# Patient Record
Sex: FEMALE | Race: WHITE | NOT HISPANIC OR LATINO | Employment: FULL TIME | URBAN - METROPOLITAN AREA
[De-identification: names, ages, dates, MRNs, and addresses within clinical notes are randomized per-mention and may not be internally consistent; named-entity substitution may affect disease eponyms.]

---

## 2017-03-07 RX ORDER — HYDROXYZINE HYDROCHLORIDE 10 MG/1
TABLET, FILM COATED ORAL
Qty: 90 TABLET | Refills: 1 | Status: SHIPPED | OUTPATIENT
Start: 2017-03-07 | End: 2018-05-11 | Stop reason: SDUPTHER

## 2017-03-24 RX ORDER — HYDROXYZINE HYDROCHLORIDE 10 MG/1
TABLET, FILM COATED ORAL
Qty: 90 TABLET | Refills: 0 | Status: SHIPPED | OUTPATIENT
Start: 2017-03-24 | End: 2017-08-16 | Stop reason: SDUPTHER

## 2017-07-28 DIAGNOSIS — Z12.11 COLON CANCER SCREENING: ICD-10-CM

## 2017-08-16 DIAGNOSIS — K21.9 GASTROESOPHAGEAL REFLUX DISEASE, ESOPHAGITIS PRESENCE NOT SPECIFIED: Primary | ICD-10-CM

## 2017-08-16 DIAGNOSIS — Z72.820 POOR SLEEP: ICD-10-CM

## 2017-08-16 RX ORDER — HYDROXYZINE HYDROCHLORIDE 10 MG/1
10 TABLET, FILM COATED ORAL NIGHTLY PRN
Qty: 30 TABLET | Refills: 0 | Status: SHIPPED | OUTPATIENT
Start: 2017-08-16 | End: 2017-11-15 | Stop reason: SDUPTHER

## 2017-08-16 RX ORDER — ESOMEPRAZOLE MAGNESIUM 40 MG/1
40 CAPSULE, DELAYED RELEASE ORAL
Qty: 30 CAPSULE | Refills: 0 | Status: SHIPPED | OUTPATIENT
Start: 2017-08-16 | End: 2017-11-15 | Stop reason: SDUPTHER

## 2017-08-25 DIAGNOSIS — Z12.31 OTHER SCREENING MAMMOGRAM: ICD-10-CM

## 2017-09-28 RX ORDER — ESOMEPRAZOLE MAGNESIUM 40 MG/1
CAPSULE, DELAYED RELEASE ORAL
Qty: 30 CAPSULE | Refills: 0 | Status: SHIPPED | OUTPATIENT
Start: 2017-09-28 | End: 2017-11-15 | Stop reason: SDUPTHER

## 2017-11-01 ENCOUNTER — PATIENT OUTREACH (OUTPATIENT)
Dept: INTERNAL MEDICINE | Facility: CLINIC | Age: 57
End: 2017-11-01

## 2017-11-01 NOTE — PROGRESS NOTES
[facility] is committed to your overall health.  To help you get the most out of each of your visits, we will review your information to make sure you are up to date on all of your recommended tests and/or procedures.       Your PCP  Tere Brown MD   found that you may be due for:       Health Maintenance Due   Topic Date Due    Mammogram  01/10/2002    Colonoscopy  01/10/2012             If you have had any of the above done at another facility, please bring the records or information with you so that your record at [facility] will be complete.  If you would like to schedule any of these, please contact me.     If you are currently taking medication, please bring it with you to your appointment for review.     Also, if you have any type of Advanced Directives, please bring them with you to your office visit so we may scan them into your chart.     Thank you for Choosing [facility] for your healthcare needs.      Additional Information  If you have questions, you can email [email or web address] or call [phone]  to talk to our [facility] staff. Remember, [facility] is NOT to be used for urgent needs. For medical emergencies, dial 911.

## 2017-11-15 ENCOUNTER — TELEPHONE (OUTPATIENT)
Dept: INTERNAL MEDICINE | Facility: CLINIC | Age: 57
End: 2017-11-15

## 2017-11-15 ENCOUNTER — LAB VISIT (OUTPATIENT)
Dept: LAB | Facility: HOSPITAL | Age: 57
End: 2017-11-15
Attending: FAMILY MEDICINE
Payer: COMMERCIAL

## 2017-11-15 ENCOUNTER — PATIENT MESSAGE (OUTPATIENT)
Dept: INTERNAL MEDICINE | Facility: CLINIC | Age: 57
End: 2017-11-15

## 2017-11-15 ENCOUNTER — OFFICE VISIT (OUTPATIENT)
Dept: INTERNAL MEDICINE | Facility: CLINIC | Age: 57
End: 2017-11-15
Payer: COMMERCIAL

## 2017-11-15 VITALS
HEART RATE: 88 BPM | SYSTOLIC BLOOD PRESSURE: 126 MMHG | DIASTOLIC BLOOD PRESSURE: 78 MMHG | BODY MASS INDEX: 32.87 KG/M2 | OXYGEN SATURATION: 97 % | HEIGHT: 65 IN | WEIGHT: 197.31 LBS

## 2017-11-15 DIAGNOSIS — K21.9 GASTROESOPHAGEAL REFLUX DISEASE, ESOPHAGITIS PRESENCE NOT SPECIFIED: ICD-10-CM

## 2017-11-15 DIAGNOSIS — Z00.00 ANNUAL PHYSICAL EXAM: ICD-10-CM

## 2017-11-15 DIAGNOSIS — Z80.41 FAMILY HISTORY OF OVARIAN CANCER: ICD-10-CM

## 2017-11-15 DIAGNOSIS — Z12.39 BREAST CANCER SCREENING: ICD-10-CM

## 2017-11-15 DIAGNOSIS — R73.03 PREDIABETES: ICD-10-CM

## 2017-11-15 DIAGNOSIS — Z12.11 COLON CANCER SCREENING: ICD-10-CM

## 2017-11-15 DIAGNOSIS — E78.5 HYPERLIPIDEMIA, UNSPECIFIED HYPERLIPIDEMIA TYPE: ICD-10-CM

## 2017-11-15 DIAGNOSIS — E55.9 VITAMIN D DEFICIENCY: ICD-10-CM

## 2017-11-15 DIAGNOSIS — E78.5 HYPERLIPIDEMIA, UNSPECIFIED HYPERLIPIDEMIA TYPE: Primary | ICD-10-CM

## 2017-11-15 DIAGNOSIS — Z51.81 ENCOUNTER FOR MONITORING LONG-TERM PROTON PUMP INHIBITOR THERAPY: ICD-10-CM

## 2017-11-15 DIAGNOSIS — G56.02 CARPAL TUNNEL SYNDROME OF LEFT WRIST: ICD-10-CM

## 2017-11-15 DIAGNOSIS — Z79.899 ENCOUNTER FOR MONITORING LONG-TERM PROTON PUMP INHIBITOR THERAPY: ICD-10-CM

## 2017-11-15 DIAGNOSIS — D35.01 ADENOMA OF RIGHT ADRENAL GLAND: ICD-10-CM

## 2017-11-15 DIAGNOSIS — Z00.00 ANNUAL PHYSICAL EXAM: Primary | ICD-10-CM

## 2017-11-15 LAB
25(OH)D3+25(OH)D2 SERPL-MCNC: 15 NG/ML
ALBUMIN SERPL BCP-MCNC: 3.4 G/DL
ALP SERPL-CCNC: 100 U/L
ALT SERPL W/O P-5'-P-CCNC: 15 U/L
ANION GAP SERPL CALC-SCNC: 8 MMOL/L
AST SERPL-CCNC: 15 U/L
BASOPHILS # BLD AUTO: 0.05 K/UL
BASOPHILS NFR BLD: 0.7 %
BILIRUB SERPL-MCNC: 0.4 MG/DL
BUN SERPL-MCNC: 13 MG/DL
CALCIUM SERPL-MCNC: 9.1 MG/DL
CHLORIDE SERPL-SCNC: 106 MMOL/L
CHOLEST SERPL-MCNC: 253 MG/DL
CHOLEST/HDLC SERPL: 5.1 {RATIO}
CO2 SERPL-SCNC: 27 MMOL/L
CREAT SERPL-MCNC: 0.8 MG/DL
DIFFERENTIAL METHOD: ABNORMAL
EOSINOPHIL # BLD AUTO: 0.1 K/UL
EOSINOPHIL NFR BLD: 1.7 %
ERYTHROCYTE [DISTWIDTH] IN BLOOD BY AUTOMATED COUNT: 13.4 %
EST. GFR  (AFRICAN AMERICAN): >60 ML/MIN/1.73 M^2
EST. GFR  (NON AFRICAN AMERICAN): >60 ML/MIN/1.73 M^2
ESTIMATED AVG GLUCOSE: 123 MG/DL
GLUCOSE SERPL-MCNC: 100 MG/DL
HBA1C MFR BLD HPLC: 5.9 %
HCT VFR BLD AUTO: 35.5 %
HDLC SERPL-MCNC: 50 MG/DL
HDLC SERPL: 19.8 %
HGB BLD-MCNC: 11.3 G/DL
IMM GRANULOCYTES # BLD AUTO: 0.05 K/UL
IMM GRANULOCYTES NFR BLD AUTO: 0.7 %
LDLC SERPL CALC-MCNC: 154.8 MG/DL
LYMPHOCYTES # BLD AUTO: 2.6 K/UL
LYMPHOCYTES NFR BLD: 34.6 %
MAGNESIUM SERPL-MCNC: 2 MG/DL
MCH RBC QN AUTO: 26.3 PG
MCHC RBC AUTO-ENTMCNC: 31.8 G/DL
MCV RBC AUTO: 83 FL
MONOCYTES # BLD AUTO: 0.5 K/UL
MONOCYTES NFR BLD: 6 %
NEUTROPHILS # BLD AUTO: 4.3 K/UL
NEUTROPHILS NFR BLD: 56.3 %
NONHDLC SERPL-MCNC: 203 MG/DL
NRBC BLD-RTO: 0 /100 WBC
PLATELET # BLD AUTO: 325 K/UL
PMV BLD AUTO: 10.1 FL
POTASSIUM SERPL-SCNC: 4 MMOL/L
PROT SERPL-MCNC: 7.8 G/DL
RBC # BLD AUTO: 4.3 M/UL
SODIUM SERPL-SCNC: 141 MMOL/L
TRIGL SERPL-MCNC: 241 MG/DL
TSH SERPL DL<=0.005 MIU/L-ACNC: 1.02 UIU/ML
VIT B12 SERPL-MCNC: 404 PG/ML
WBC # BLD AUTO: 7.62 K/UL

## 2017-11-15 PROCEDURE — 84443 ASSAY THYROID STIM HORMONE: CPT

## 2017-11-15 PROCEDURE — 80053 COMPREHEN METABOLIC PANEL: CPT

## 2017-11-15 PROCEDURE — 83735 ASSAY OF MAGNESIUM: CPT

## 2017-11-15 PROCEDURE — 99999 PR PBB SHADOW E&M-EST. PATIENT-LVL III: CPT | Mod: PBBFAC,,, | Performed by: FAMILY MEDICINE

## 2017-11-15 PROCEDURE — 82306 VITAMIN D 25 HYDROXY: CPT

## 2017-11-15 PROCEDURE — 82607 VITAMIN B-12: CPT

## 2017-11-15 PROCEDURE — 80061 LIPID PANEL: CPT

## 2017-11-15 PROCEDURE — 36415 COLL VENOUS BLD VENIPUNCTURE: CPT

## 2017-11-15 PROCEDURE — 85025 COMPLETE CBC W/AUTO DIFF WBC: CPT

## 2017-11-15 PROCEDURE — 99396 PREV VISIT EST AGE 40-64: CPT | Mod: S$GLB,,, | Performed by: FAMILY MEDICINE

## 2017-11-15 PROCEDURE — 83036 HEMOGLOBIN GLYCOSYLATED A1C: CPT

## 2017-11-15 RX ORDER — ESOMEPRAZOLE MAGNESIUM 40 MG/1
40 CAPSULE, DELAYED RELEASE ORAL EVERY MORNING
Qty: 90 CAPSULE | Refills: 3 | Status: SHIPPED | OUTPATIENT
Start: 2017-11-15 | End: 2018-01-15

## 2017-11-15 RX ORDER — PRAVASTATIN SODIUM 20 MG/1
20 TABLET ORAL DAILY
Qty: 90 TABLET | Refills: 3 | Status: SHIPPED | OUTPATIENT
Start: 2017-11-15 | End: 2017-11-15 | Stop reason: SDUPTHER

## 2017-11-15 RX ORDER — PRAVASTATIN SODIUM 40 MG/1
40 TABLET ORAL DAILY
Qty: 90 TABLET | Refills: 1 | Status: SHIPPED | OUTPATIENT
Start: 2017-11-15 | End: 2018-05-07 | Stop reason: SDUPTHER

## 2017-11-15 NOTE — PROGRESS NOTES
Subjective:       Patient ID: Teodora Yuen is a 57 y.o. female.    Chief Complaint: Annual Exam    HPI   56 y/o female with Vit D def, hx of Osteopenia, family hx of ovarian ca is here for annual exam, she moved here from [place] last year  L hand pain over thenar eminence, she has noticed it more over the past 2 months, she is L handed and a , pain is throbbing, tender to the touch, she wakes up at night and has to shake it out, she denies L hand weakness, she has tried baby asa which did not help  Doing well, denies f, has been out of her nexium now she has some nausea and vomiting and heartburn, denies d/constipation/cp/palpitations/sob/urinary sx. She is active and doing 17,000 steps a day, doing elliptical 30 min a day.  Sleeping well with prn hydroxyzine. Eating healthy.  Colonoscopy done in 2015  GYN: mmg due, no recent pelvic but stacy with bso in 1999 secondary to family hx of ovarian cancer and endometriosis  Dexa: normal in 2014  Eye due  Dental due  Vaccine: utd  R sided adrenal mass: was seen by endo, has imaging done every 5 years, last was done in 11/2014, will try to retrieve again  Gerd: Dr. Hester did EGD 2 years, she has a hiatal hernia, has gastric ulcer in the past, her symptoms are fairly well controlled on nexium 40 mg  Osteopenia: was on Actonel for 6 months and repeat scan was normal per patient need to get report    Review of Systems   Constitutional: Negative for activity change, appetite change, fatigue and fever.   Respiratory: Negative for cough and shortness of breath.    Cardiovascular: Negative for chest pain, palpitations and leg swelling.   Gastrointestinal: Positive for nausea and vomiting. Negative for constipation and diarrhea.        Heartburn   Genitourinary: Negative for difficulty urinating and dysuria.   Musculoskeletal:        L thenar emininence pain   Skin: Negative for rash.   Neurological: Negative for dizziness and light-headedness.   Psychiatric/Behavioral:  Negative for sleep disturbance.       Objective:      Physical Exam   Constitutional: She appears well-developed and well-nourished.   HENT:   Head: Normocephalic and atraumatic.   Mouth/Throat: No oropharyngeal exudate.   Neck: Normal range of motion. Neck supple. No thyromegaly present.   Cardiovascular: Normal rate, regular rhythm and normal heart sounds.    Pulmonary/Chest: Effort normal and breath sounds normal. No respiratory distress.   Abdominal: Soft. Bowel sounds are normal. She exhibits no distension.   Musculoskeletal: Normal range of motion. She exhibits no edema.   Strength 5/5 bilateral upper and lower extremity     Lymphadenopathy:     She has no cervical adenopathy.   Neurological: She is alert.   Skin: Skin is warm and dry.   Psychiatric: She has a normal mood and affect.   Nursing note and vitals reviewed.      Assessment:       1. Annual physical exam    2. Breast cancer screening    3. Colon cancer screening    4. Prediabetes    5. Hyperlipidemia, unspecified hyperlipidemia type    6. Gastroesophageal reflux disease, esophagitis presence not specified    7. Adenoma of right adrenal gland    8. Carpal tunnel syndrome of left wrist    9. Family history of ovarian cancer    10. Vitamin D deficiency    11. Encounter for monitoring long-term proton pump inhibitor therapy        Plan:       Teodora was seen today for annual exam.    Diagnoses and all orders for this visit:    Annual physical exam  -     Comprehensive metabolic panel; Future  -     CBC auto differential; Future  -     TSH; Future    Breast cancer screening    Colon cancer screening    Prediabetes  -     Hemoglobin A1c; Future    Hyperlipidemia, unspecified hyperlipidemia type  -     pravastatin (PRAVACHOL) 20 MG tablet; Take 1 tablet (20 mg total) by mouth once daily.  -     Lipid panel; Future    Gastroesophageal reflux disease, esophagitis presence not specified  -     esomeprazole (NEXIUM) 40 MG capsule; Take 1 capsule (40 mg total)  by mouth every morning.  -     Vitamin B12; Future  -     Magnesium; Future    Adenoma of right adrenal gland    Carpal tunnel syndrome of left wrist    Family history of ovarian cancer    Vitamin D deficiency  -     Vitamin D; Future    Encounter for monitoring long-term proton pump inhibitor therapy  -     Vitamin B12; Future  -     Magnesium; Future    Other orders  -     Cancel: Mammo Digital Screening Bilat with Tomosynthesis CAD; Future  -     Cancel: Ambulatory consult to Gastroenterology        Discussed CTS and treatment options, splinting, injection and surgery, she will let me know if wants to pursue EMG or hand ortho visit

## 2017-11-22 ENCOUNTER — TELEPHONE (OUTPATIENT)
Dept: INTERNAL MEDICINE | Facility: CLINIC | Age: 57
End: 2017-11-22

## 2018-01-04 ENCOUNTER — TELEPHONE (OUTPATIENT)
Dept: INTERNAL MEDICINE | Facility: CLINIC | Age: 58
End: 2018-01-04

## 2018-01-04 NOTE — TELEPHONE ENCOUNTER
----- Message from Kristina Bro sent at 1/4/2018 10:32 AM CST -----  Contact: pt   x 1st Request  _ 2nd Request  _ 3rd Request    Who: pt     Why: Pt is calling with questions in regards to her, esomeprazole (NEXIUM) 40 MG capsule 90 capsule.     What Number to Call Back: [phone]     When to Expect a call back: (Before the end of the day)  -- if call after 3:00 call back will be tomorrow.

## 2018-01-04 NOTE — TELEPHONE ENCOUNTER
Pt states that nexium is going up to 45 dollars for either a 30 day supply or 90 day supply and she does not want to pay the higher price. Pt would like to know what other medication she could take instead of nexium. Pt is just looking fo advise now and does not want a new script yet. Please advise and authorize.

## 2018-01-05 NOTE — TELEPHONE ENCOUNTER
She can either by it OTC nexium and take 2 at a time to equal 40 mg or see if she does ok on 20 mg which is the OTC dose or we can try omeprazole or pantoprazole, she can her pharmacy to see which would be cheaper

## 2018-01-05 NOTE — TELEPHONE ENCOUNTER
Informed pt of Dr. Brown advise. Pt states that she has tried and failed pantoprazole in the past and that the medication does not work for her. Pt states that she will call her pharmacy to see how much the medication options would cost. Pt has no further questions or concerns.

## 2018-01-10 ENCOUNTER — PATIENT MESSAGE (OUTPATIENT)
Dept: INTERNAL MEDICINE | Facility: CLINIC | Age: 58
End: 2018-01-10

## 2018-01-15 RX ORDER — CALC/MAG/B COMPLEX/D3/HERB 61
15 TABLET ORAL DAILY
Qty: 90 CAPSULE | Refills: 1 | Status: SHIPPED | OUTPATIENT
Start: 2018-01-15 | End: 2018-05-11

## 2018-05-07 DIAGNOSIS — E78.5 HYPERLIPIDEMIA, UNSPECIFIED HYPERLIPIDEMIA TYPE: ICD-10-CM

## 2018-05-07 RX ORDER — PRAVASTATIN SODIUM 40 MG/1
40 TABLET ORAL DAILY
Qty: 90 TABLET | Refills: 1 | Status: SHIPPED | OUTPATIENT
Start: 2018-05-07 | End: 2018-12-27 | Stop reason: SDUPTHER

## 2018-05-11 ENCOUNTER — PATIENT MESSAGE (OUTPATIENT)
Dept: INTERNAL MEDICINE | Facility: CLINIC | Age: 58
End: 2018-05-11

## 2018-05-11 DIAGNOSIS — K21.9 GASTROESOPHAGEAL REFLUX DISEASE, ESOPHAGITIS PRESENCE NOT SPECIFIED: Primary | ICD-10-CM

## 2018-05-11 RX ORDER — ESOMEPRAZOLE MAGNESIUM 40 MG/1
40 CAPSULE, DELAYED RELEASE ORAL
Qty: 90 CAPSULE | Refills: 1 | Status: SHIPPED | OUTPATIENT
Start: 2018-05-11 | End: 2018-10-26 | Stop reason: SDUPTHER

## 2018-05-11 RX ORDER — ESOMEPRAZOLE MAGNESIUM 40 MG/1
CAPSULE, DELAYED RELEASE ORAL
COMMUNITY
Start: 2018-02-22 | End: 2018-05-11 | Stop reason: SDUPTHER

## 2018-05-11 RX ORDER — HYDROXYZINE HYDROCHLORIDE 10 MG/1
10 TABLET, FILM COATED ORAL NIGHTLY PRN
Qty: 90 TABLET | Refills: 1 | Status: SHIPPED | OUTPATIENT
Start: 2018-05-11 | End: 2021-11-03 | Stop reason: SDUPTHER

## 2018-08-10 DIAGNOSIS — Z12.11 COLON CANCER SCREENING: ICD-10-CM

## 2018-10-26 ENCOUNTER — PATIENT MESSAGE (OUTPATIENT)
Dept: INTERNAL MEDICINE | Facility: CLINIC | Age: 58
End: 2018-10-26

## 2018-10-26 DIAGNOSIS — K21.9 GASTROESOPHAGEAL REFLUX DISEASE, ESOPHAGITIS PRESENCE NOT SPECIFIED: ICD-10-CM

## 2018-10-26 DIAGNOSIS — Z79.899 ENCOUNTER FOR MONITORING LONG-TERM PROTON PUMP INHIBITOR THERAPY: ICD-10-CM

## 2018-10-26 DIAGNOSIS — Z00.00 ANNUAL PHYSICAL EXAM: ICD-10-CM

## 2018-10-26 DIAGNOSIS — Z51.81 ENCOUNTER FOR MONITORING LONG-TERM PROTON PUMP INHIBITOR THERAPY: ICD-10-CM

## 2018-10-26 DIAGNOSIS — E78.5 HYPERLIPIDEMIA, UNSPECIFIED HYPERLIPIDEMIA TYPE: ICD-10-CM

## 2018-10-26 DIAGNOSIS — R73.03 PREDIABETES: Primary | ICD-10-CM

## 2018-10-26 RX ORDER — ESOMEPRAZOLE MAGNESIUM 40 MG/1
40 CAPSULE, DELAYED RELEASE ORAL
Qty: 90 CAPSULE | Refills: 1 | Status: CANCELLED | OUTPATIENT
Start: 2018-10-26

## 2018-10-29 ENCOUNTER — PATIENT OUTREACH (OUTPATIENT)
Dept: ADMINISTRATIVE | Facility: HOSPITAL | Age: 58
End: 2018-10-29

## 2018-10-29 ENCOUNTER — PATIENT MESSAGE (OUTPATIENT)
Dept: ADMINISTRATIVE | Facility: HOSPITAL | Age: 58
End: 2018-10-29

## 2018-10-29 DIAGNOSIS — Z12.39 BREAST CANCER SCREENING: ICD-10-CM

## 2018-10-29 RX ORDER — ESOMEPRAZOLE MAGNESIUM 40 MG/1
40 CAPSULE, DELAYED RELEASE ORAL
Qty: 90 CAPSULE | Refills: 1 | Status: SHIPPED | OUTPATIENT
Start: 2018-10-29 | End: 2018-10-29 | Stop reason: SDUPTHER

## 2018-10-29 RX ORDER — ESOMEPRAZOLE MAGNESIUM 40 MG/1
40 CAPSULE, DELAYED RELEASE ORAL
Qty: 90 CAPSULE | Refills: 1 | Status: SHIPPED | OUTPATIENT
Start: 2018-10-29 | End: 2019-06-28 | Stop reason: SDUPTHER

## 2018-10-29 NOTE — TELEPHONE ENCOUNTER
Pt informed prescription was sent to [facility]. Scheduled pt for labs 12/17 at [facility].

## 2018-12-13 ENCOUNTER — PATIENT OUTREACH (OUTPATIENT)
Dept: ADMINISTRATIVE | Facility: HOSPITAL | Age: 58
End: 2018-12-13

## 2018-12-17 ENCOUNTER — LAB VISIT (OUTPATIENT)
Dept: LAB | Facility: HOSPITAL | Age: 58
End: 2018-12-17
Attending: FAMILY MEDICINE
Payer: COMMERCIAL

## 2018-12-17 DIAGNOSIS — E78.5 HYPERLIPIDEMIA, UNSPECIFIED HYPERLIPIDEMIA TYPE: ICD-10-CM

## 2018-12-17 DIAGNOSIS — Z51.81 ENCOUNTER FOR MONITORING LONG-TERM PROTON PUMP INHIBITOR THERAPY: ICD-10-CM

## 2018-12-17 DIAGNOSIS — R73.03 PREDIABETES: ICD-10-CM

## 2018-12-17 DIAGNOSIS — Z00.00 ANNUAL PHYSICAL EXAM: ICD-10-CM

## 2018-12-17 DIAGNOSIS — Z79.899 ENCOUNTER FOR MONITORING LONG-TERM PROTON PUMP INHIBITOR THERAPY: ICD-10-CM

## 2018-12-17 DIAGNOSIS — K21.9 GASTROESOPHAGEAL REFLUX DISEASE, ESOPHAGITIS PRESENCE NOT SPECIFIED: ICD-10-CM

## 2018-12-17 LAB
25(OH)D3+25(OH)D2 SERPL-MCNC: 27 NG/ML
ALBUMIN SERPL BCP-MCNC: 3.4 G/DL
ALP SERPL-CCNC: 104 U/L
ALT SERPL W/O P-5'-P-CCNC: 17 U/L
ANION GAP SERPL CALC-SCNC: 10 MMOL/L
AST SERPL-CCNC: 18 U/L
BASOPHILS # BLD AUTO: 0.07 K/UL
BASOPHILS NFR BLD: 0.9 %
BILIRUB SERPL-MCNC: 0.3 MG/DL
BUN SERPL-MCNC: 16 MG/DL
CALCIUM SERPL-MCNC: 9.1 MG/DL
CHLORIDE SERPL-SCNC: 106 MMOL/L
CHOLEST SERPL-MCNC: 201 MG/DL
CHOLEST/HDLC SERPL: 4.6 {RATIO}
CO2 SERPL-SCNC: 24 MMOL/L
CREAT SERPL-MCNC: 0.7 MG/DL
DIFFERENTIAL METHOD: ABNORMAL
EOSINOPHIL # BLD AUTO: 0.3 K/UL
EOSINOPHIL NFR BLD: 3.5 %
ERYTHROCYTE [DISTWIDTH] IN BLOOD BY AUTOMATED COUNT: 16.2 %
EST. GFR  (AFRICAN AMERICAN): >60 ML/MIN/1.73 M^2
EST. GFR  (NON AFRICAN AMERICAN): >60 ML/MIN/1.73 M^2
ESTIMATED AVG GLUCOSE: 131 MG/DL
GLUCOSE SERPL-MCNC: 103 MG/DL
HBA1C MFR BLD HPLC: 6.2 %
HCT VFR BLD AUTO: 37.5 %
HDLC SERPL-MCNC: 44 MG/DL
HDLC SERPL: 21.9 %
HGB BLD-MCNC: 11.1 G/DL
IMM GRANULOCYTES # BLD AUTO: 0.04 K/UL
IMM GRANULOCYTES NFR BLD AUTO: 0.5 %
LDLC SERPL CALC-MCNC: 107.4 MG/DL
LYMPHOCYTES # BLD AUTO: 2.5 K/UL
LYMPHOCYTES NFR BLD: 33.9 %
MAGNESIUM SERPL-MCNC: 1.9 MG/DL
MCH RBC QN AUTO: 22.9 PG
MCHC RBC AUTO-ENTMCNC: 29.6 G/DL
MCV RBC AUTO: 78 FL
MONOCYTES # BLD AUTO: 0.5 K/UL
MONOCYTES NFR BLD: 6.3 %
NEUTROPHILS # BLD AUTO: 4.1 K/UL
NEUTROPHILS NFR BLD: 54.9 %
NONHDLC SERPL-MCNC: 157 MG/DL
NRBC BLD-RTO: 0 /100 WBC
PLATELET # BLD AUTO: 382 K/UL
PMV BLD AUTO: 10.8 FL
POTASSIUM SERPL-SCNC: 3.9 MMOL/L
PROT SERPL-MCNC: 7.6 G/DL
RBC # BLD AUTO: 4.84 M/UL
SODIUM SERPL-SCNC: 140 MMOL/L
TRIGL SERPL-MCNC: 248 MG/DL
TSH SERPL DL<=0.005 MIU/L-ACNC: 0.8 UIU/ML
VIT B12 SERPL-MCNC: 547 PG/ML
WBC # BLD AUTO: 7.5 K/UL

## 2018-12-17 PROCEDURE — 80053 COMPREHEN METABOLIC PANEL: CPT

## 2018-12-17 PROCEDURE — 36415 COLL VENOUS BLD VENIPUNCTURE: CPT

## 2018-12-17 PROCEDURE — 82607 VITAMIN B-12: CPT

## 2018-12-17 PROCEDURE — 85025 COMPLETE CBC W/AUTO DIFF WBC: CPT

## 2018-12-17 PROCEDURE — 80061 LIPID PANEL: CPT

## 2018-12-17 PROCEDURE — 83735 ASSAY OF MAGNESIUM: CPT

## 2018-12-17 PROCEDURE — 83036 HEMOGLOBIN GLYCOSYLATED A1C: CPT

## 2018-12-17 PROCEDURE — 82306 VITAMIN D 25 HYDROXY: CPT

## 2018-12-17 PROCEDURE — 84443 ASSAY THYROID STIM HORMONE: CPT

## 2018-12-27 ENCOUNTER — OFFICE VISIT (OUTPATIENT)
Dept: INTERNAL MEDICINE | Facility: CLINIC | Age: 58
End: 2018-12-27
Payer: COMMERCIAL

## 2018-12-27 ENCOUNTER — CLINICAL SUPPORT (OUTPATIENT)
Dept: INTERNAL MEDICINE | Facility: CLINIC | Age: 58
End: 2018-12-27
Payer: COMMERCIAL

## 2018-12-27 ENCOUNTER — TELEPHONE (OUTPATIENT)
Dept: INTERNAL MEDICINE | Facility: CLINIC | Age: 58
End: 2018-12-27

## 2018-12-27 ENCOUNTER — PATIENT MESSAGE (OUTPATIENT)
Dept: ADMINISTRATIVE | Facility: HOSPITAL | Age: 58
End: 2018-12-27

## 2018-12-27 VITALS
OXYGEN SATURATION: 98 % | DIASTOLIC BLOOD PRESSURE: 80 MMHG | BODY MASS INDEX: 32.68 KG/M2 | HEIGHT: 65 IN | SYSTOLIC BLOOD PRESSURE: 112 MMHG | WEIGHT: 196.13 LBS | HEART RATE: 100 BPM

## 2018-12-27 DIAGNOSIS — Z80.41 FAMILY HISTORY OF OVARIAN CANCER: ICD-10-CM

## 2018-12-27 DIAGNOSIS — R03.0 ELEVATED BLOOD PRESSURE READING IN OFFICE WITHOUT DIAGNOSIS OF HYPERTENSION: ICD-10-CM

## 2018-12-27 DIAGNOSIS — K21.9 GASTROESOPHAGEAL REFLUX DISEASE, ESOPHAGITIS PRESENCE NOT SPECIFIED: ICD-10-CM

## 2018-12-27 DIAGNOSIS — E78.5 HYPERLIPIDEMIA, UNSPECIFIED HYPERLIPIDEMIA TYPE: ICD-10-CM

## 2018-12-27 DIAGNOSIS — Z90.710 POST-HYSTERECTOMY MENOPAUSE: ICD-10-CM

## 2018-12-27 DIAGNOSIS — E89.40 POST-HYSTERECTOMY MENOPAUSE: ICD-10-CM

## 2018-12-27 DIAGNOSIS — D50.9 IRON DEFICIENCY ANEMIA, UNSPECIFIED IRON DEFICIENCY ANEMIA TYPE: ICD-10-CM

## 2018-12-27 DIAGNOSIS — R73.03 PREDIABETES: ICD-10-CM

## 2018-12-27 DIAGNOSIS — D64.9 ANEMIA, UNSPECIFIED TYPE: ICD-10-CM

## 2018-12-27 DIAGNOSIS — M85.80 OSTEOPENIA, UNSPECIFIED LOCATION: ICD-10-CM

## 2018-12-27 DIAGNOSIS — R09.82 POST-NASAL DRIP: ICD-10-CM

## 2018-12-27 DIAGNOSIS — D35.01 ADENOMA OF RIGHT ADRENAL GLAND: ICD-10-CM

## 2018-12-27 DIAGNOSIS — Z00.00 ANNUAL PHYSICAL EXAM: Primary | ICD-10-CM

## 2018-12-27 LAB
FERRITIN SERPL-MCNC: 8 NG/ML
IRON SERPL-MCNC: 30 UG/DL
RETICS/RBC NFR AUTO: 1.6 %
SATURATED IRON: 6 %
TOTAL IRON BINDING CAPACITY: 506 UG/DL
TRANSFERRIN SERPL-MCNC: 342 MG/DL

## 2018-12-27 PROCEDURE — 83540 ASSAY OF IRON: CPT

## 2018-12-27 PROCEDURE — 99999 PR PBB SHADOW E&M-EST. PATIENT-LVL V: CPT | Mod: PBBFAC,,, | Performed by: FAMILY MEDICINE

## 2018-12-27 PROCEDURE — 99396 PREV VISIT EST AGE 40-64: CPT | Mod: S$GLB,,, | Performed by: FAMILY MEDICINE

## 2018-12-27 PROCEDURE — 82728 ASSAY OF FERRITIN: CPT

## 2018-12-27 PROCEDURE — 85045 AUTOMATED RETICULOCYTE COUNT: CPT

## 2018-12-27 PROCEDURE — 36415 COLL VENOUS BLD VENIPUNCTURE: CPT

## 2018-12-27 RX ORDER — AZELASTINE 1 MG/ML
1 SPRAY, METERED NASAL 2 TIMES DAILY
Qty: 30 ML | Refills: 3 | Status: SHIPPED | OUTPATIENT
Start: 2018-12-27 | End: 2020-02-03

## 2018-12-27 RX ORDER — METFORMIN HYDROCHLORIDE 500 MG/1
500 TABLET ORAL
Qty: 90 TABLET | Refills: 3 | Status: SHIPPED | OUTPATIENT
Start: 2018-12-27 | End: 2020-01-28

## 2018-12-27 RX ORDER — FLUTICASONE PROPIONATE 50 MCG
1 SPRAY, SUSPENSION (ML) NASAL DAILY
Qty: 16 G | Refills: 3 | Status: SHIPPED | OUTPATIENT
Start: 2018-12-27 | End: 2020-02-03

## 2018-12-27 RX ORDER — PRAVASTATIN SODIUM 40 MG/1
40 TABLET ORAL DAILY
Qty: 90 TABLET | Refills: 3 | Status: SHIPPED | OUTPATIENT
Start: 2018-12-27 | End: 2020-01-20 | Stop reason: SDUPTHER

## 2018-12-27 NOTE — PROGRESS NOTES
Subjective:       Patient ID: Teodora Yuen is a 58 y.o. female.    Chief Complaint: Annual Exam and Medication Refill    HPI  57 y/o female with Vit D def, hx of Osteopenia, family hx of ovarian ca is here for annual exam.  She is doing well, she taking the stairs at work, [place] not really eating healthy. Sleeping ok with hydroxyzine prn.  Doing well, denies f, heartburn controlled on nexium 40 mg daily, has been on it since 2012 she has not tried 20 mg, denies d/constipation/cp/palpitations/sob/urinary sx. She has had pnd, nasal congestion and dry cough on and off since thanksgiving, she is taking robitussin and mucinex, found out she had mold in one of her closets  Colonoscopy done in 2015 or 2016  GYN: mmg due, no recent pelvic but stacy with bso in 1999 secondary to family hx of ovarian cancer and endometriosis  Eye due  Dental due  Vaccine: utd  R sided adrenal mass: was seen by endo, has imaging done every 5 years, last was done in 11/2014  Gerd: Dr. Hester did EGD 2 years, she has a hiatal hernia, has gastric ulcer in the past, her symptoms are fairly well controlled on nexium 40 mg  Osteopenia: was on Actonel for 6 months in the past, repeat dexa due     Review of Systems   Constitutional: Negative for activity change, appetite change, fatigue and fever.   HENT: Positive for congestion and postnasal drip. Negative for sinus pressure and sore throat.    Respiratory: Positive for cough. Negative for shortness of breath.    Cardiovascular: Negative for chest pain, palpitations and leg swelling.   Gastrointestinal: Negative for constipation, diarrhea, nausea and vomiting.   Genitourinary: Negative for difficulty urinating and dysuria.   Skin: Negative for rash.   Neurological: Negative for dizziness and light-headedness.   Psychiatric/Behavioral: Negative for sleep disturbance.       Objective:      BP (!) 136/90 (BP Location: Left arm, Patient Position: Sitting, BP Method:  "Medium (Manual))   Pulse 100   Ht 5' 5" (1.651 m)   Wt 89 kg (196 lb 1.6 oz)   SpO2 98%   BMI 32.63 kg/m²   Physical Exam   Constitutional: She appears well-developed and well-nourished.   HENT:   Head: Normocephalic and atraumatic.   Mouth/Throat: No oropharyngeal exudate.   Neck: Normal range of motion. Neck supple. No thyromegaly present.   Cardiovascular: Normal rate, regular rhythm and normal heart sounds.   Pulmonary/Chest: Effort normal and breath sounds normal. No respiratory distress.   Abdominal: Soft. Bowel sounds are normal. She exhibits no distension. There is no tenderness.   Musculoskeletal: She exhibits no edema.   Lymphadenopathy:     She has no cervical adenopathy.   Neurological: She is alert.   Skin: Skin is warm and dry.   Psychiatric: She has a normal mood and affect.   Nursing note and vitals reviewed.      Assessment:       1. Annual physical exam    2. Adenoma of right adrenal gland    3. Family history of ovarian cancer    4. Hyperlipidemia, unspecified hyperlipidemia type    5. Prediabetes    6. Anemia, unspecified type    7. Gastroesophageal reflux disease, esophagitis presence not specified    8. Post-hysterectomy menopause    9. Osteopenia, unspecified location    10. Elevated blood pressure reading in office without diagnosis of hypertension    11. Post-nasal drip        Plan:   Teodora was seen today for annual exam and medication refill.    Diagnoses and all orders for this visit:    Annual physical exam  -     Ambulatory referral to Optometry    Adenoma of right adrenal gland  -     CT Abdomen Pelvis W Wo Contrast; Future    Family history of ovarian cancer  -     Ambulatory referral to Genetics    Hyperlipidemia, unspecified hyperlipidemia type  -     Ambulatory Referral to Bariatric Surgery  -     pravastatin (PRAVACHOL) 40 MG tablet; Take 1 tablet (40 mg total) by mouth once daily.    Prediabetes  -     Ambulatory Referral to Bariatric Surgery  -     metFORMIN (GLUCOPHAGE) 500 " MG tablet; Take 1 tablet (500 mg total) by mouth daily with breakfast.  -     Hemoglobin A1c; Future    Anemia, unspecified type  -     Ferritin; Future  -     Reticulocytes; Future  -     Cancel: Iron and TIBC; Future  -     Iron and TIBC; Future    Gastroesophageal reflux disease, esophagitis presence not specified    Post-hysterectomy menopause  -     DXA Bone Density Spine And Hip; Future    Osteopenia, unspecified location  -     DXA Bone Density Spine And Hip; Future    Elevated blood pressure reading in office without diagnosis of hypertension    Post-nasal drip  -     fluticasone (FLONASE) 50 mcg/actuation nasal spray; 1 spray (50 mcg total) by Each Nare route once daily.  -     azelastine (ASTELIN) 137 mcg (0.1 %) nasal spray; 1 spray (137 mcg total) by Nasal route 2 (two) times daily.    increasing vitamin D to 4,000 IU daily, trial of otc nexium 20 mg

## 2018-12-28 ENCOUNTER — PATIENT MESSAGE (OUTPATIENT)
Dept: INTERNAL MEDICINE | Facility: CLINIC | Age: 58
End: 2018-12-28

## 2018-12-28 PROBLEM — D50.9 IRON DEFICIENCY ANEMIA: Status: ACTIVE | Noted: 2018-12-28

## 2018-12-28 RX ORDER — FERROUS GLUCONATE 324(37.5)
1 TABLET ORAL
Qty: 90 TABLET | Refills: 6 | Status: SHIPPED | OUTPATIENT
Start: 2018-12-28 | End: 2019-10-21

## 2018-12-29 ENCOUNTER — HOSPITAL ENCOUNTER (OUTPATIENT)
Dept: RADIOLOGY | Facility: HOSPITAL | Age: 58
Discharge: HOME | End: 2018-12-29
Attending: FAMILY MEDICINE
Payer: COMMERCIAL

## 2018-12-29 DIAGNOSIS — D35.01 ADENOMA OF RIGHT ADRENAL GLAND: ICD-10-CM

## 2018-12-29 PROCEDURE — 74178 CT ABD&PLV WO CNTR FLWD CNTR: CPT | Mod: 26,,, | Performed by: RADIOLOGY

## 2018-12-29 PROCEDURE — 25500020 PHARM REV CODE 255: Performed by: FAMILY MEDICINE

## 2018-12-29 PROCEDURE — 74178 CT ABD&PLV WO CNTR FLWD CNTR: CPT | Mod: TC

## 2018-12-29 RX ADMIN — IOHEXOL 100 ML: 350 INJECTION, SOLUTION INTRAVENOUS at 11:12

## 2018-12-31 ENCOUNTER — PATIENT MESSAGE (OUTPATIENT)
Dept: ADMINISTRATIVE | Facility: HOSPITAL | Age: 58
End: 2018-12-31

## 2019-01-02 ENCOUNTER — TELEPHONE (OUTPATIENT)
Dept: INTERNAL MEDICINE | Facility: CLINIC | Age: 59
End: 2019-01-02

## 2019-01-02 DIAGNOSIS — D35.01 ADENOMA OF RIGHT ADRENAL GLAND: Primary | ICD-10-CM

## 2019-01-02 NOTE — TELEPHONE ENCOUNTER
----- Message from Tere Brown MD sent at 12/27/2018  8:43 AM CST -----  Please help with healthy eating goals

## 2019-01-02 NOTE — TELEPHONE ENCOUNTER
Patient referred by Dr. Brown for Health coaching (healthy eating goals, lifestyle changes).  Called patient and gave an explanation about Health Coaching program and invited participation.   Patient states she would like to participate.  States she is going to work on setting up appt and will call back. Gave direct contact number to call if she has any questions [phone].   Soniya Webb RN  Health

## 2019-01-02 NOTE — TELEPHONE ENCOUNTER
"Informed pt "Please let the patient know that Dr. Brown will be back tomorrow to review the results of the CT scan.  For now, the CT does not show anything urgent."    Pt verbalized and understood.  "

## 2019-01-02 NOTE — TELEPHONE ENCOUNTER
Please let the patient know that Dr. Brown will be back tomorrow to review the results of the CT scan.  For now, the CT does not show anything urgent.

## 2019-01-03 ENCOUNTER — OFFICE VISIT (OUTPATIENT)
Dept: UROLOGY | Facility: CLINIC | Age: 59
End: 2019-01-03
Payer: COMMERCIAL

## 2019-01-03 ENCOUNTER — PATIENT MESSAGE (OUTPATIENT)
Dept: UROLOGY | Facility: CLINIC | Age: 59
End: 2019-01-03

## 2019-01-03 VITALS
HEIGHT: 65 IN | BODY MASS INDEX: 32.99 KG/M2 | WEIGHT: 198 LBS | HEART RATE: 93 BPM | DIASTOLIC BLOOD PRESSURE: 83 MMHG | SYSTOLIC BLOOD PRESSURE: 130 MMHG

## 2019-01-03 DIAGNOSIS — D35.01 ADENOMA OF RIGHT ADRENAL GLAND: Primary | ICD-10-CM

## 2019-01-03 PROCEDURE — 99999 PR PBB SHADOW E&M-EST. PATIENT-LVL III: ICD-10-PCS | Mod: PBBFAC,,, | Performed by: UROLOGY

## 2019-01-03 PROCEDURE — 99204 PR OFFICE/OUTPT VISIT, NEW, LEVL IV, 45-59 MIN: ICD-10-PCS | Mod: S$GLB,,, | Performed by: UROLOGY

## 2019-01-03 PROCEDURE — 99999 PR PBB SHADOW E&M-EST. PATIENT-LVL III: CPT | Mod: PBBFAC,,, | Performed by: UROLOGY

## 2019-01-03 PROCEDURE — 3008F PR BODY MASS INDEX (BMI) DOCUMENTED: ICD-10-PCS | Mod: CPTII,S$GLB,, | Performed by: UROLOGY

## 2019-01-03 PROCEDURE — 3008F BODY MASS INDEX DOCD: CPT | Mod: CPTII,S$GLB,, | Performed by: UROLOGY

## 2019-01-03 PROCEDURE — 99204 OFFICE O/P NEW MOD 45 MIN: CPT | Mod: S$GLB,,, | Performed by: UROLOGY

## 2019-01-03 NOTE — PROGRESS NOTES
Subjective:       Patient ID: Teodora Yuen is a 58 y.o. female.    Chief Complaint: Advice Only (had a nodule on adenoma , and patient state it have grown a little  and she was concern about the increase in sides. )    HPI    Teodora Yuen is a 58 y.o. female here for discussion and management of adrenal gland adenocarcinoma. CT Abdomen on 12/29/18 shows a 3.4 cm right adrenal adenoma. Notes this was first diagnosed 10 years ago and at that time it was 1.5 cm. She last saw an endocrinologist in 2015. Notes headaches, fatigue, and  increased blood pressure. Patient does not take any blood pressure medication. 24 hour urine and blood tests have all been negative by an outside endocrinologist in the past.    Past Medical History:   Diagnosis Date    Adrenal mass, right     GERD (gastroesophageal reflux disease)     Hyperlipidemia        Past Surgical History:   Procedure Laterality Date    BILE DUCT STENT PLACEMENT      CHOLECYSTECTOMY      HYSTERECTOMY  1999    KRISTIN with BSO secondary to family hx ovarian ca and endometriosis    R ankle surgery Right     toe fracture Left     4th metatarsal       Family History   Problem Relation Age of Onset    Cancer Mother         ovarian    Cancer Maternal Grandmother         ovarian       Social History     Socioeconomic History    Marital status:      Spouse name: Not on file    Number of children: 3    Years of education: Not on file    Highest education level: Not on file   Social Needs    Financial resource strain: Not on file    Food insecurity - worry: Not on file    Food insecurity - inability: Not on file    Transportation needs - medical: Not on file    Transportation needs - non-medical: Not on file   Occupational History    Occupation:    Tobacco Use    Smoking status: Never Smoker   Substance and Sexual Activity    Alcohol use: Yes     Comment: social    Drug use: Not on file    Sexual activity: Not on file   Other Topics  Concern    Not on file   Social History Narrative    Not on file       Allergies:  Coconut    Medications:    Current Outpatient Medications:     azelastine (ASTELIN) 137 mcg (0.1 %) nasal spray, Use 1 spray (137 mcg total) in each nostril 2 (two) times daily., Disp: 30 mL, Rfl: 3    calcium carbonate-vitamin D3 (CALCIUM 500 WITH D) 500 mg(1,250mg) -400 unit Tab, , Disp: , Rfl:     esomeprazole (NEXIUM) 40 MG capsule, Take 1 capsule (40 mg total) by mouth before breakfast., Disp: 90 capsule, Rfl: 1    ferrous gluconate 324 mg (37.5 mg iron) Tab, Take 1 tablet (324 mg total) by mouth 3 (three) times daily with meals., Disp: 90 tablet, Rfl: 6    fluticasone (FLONASE) 50 mcg/actuation nasal spray, Use 1 spray (50 mcg total) in each nostril once daily., Disp: 16 g, Rfl: 3    metFORMIN (GLUCOPHAGE) 500 MG tablet, Take 1 tablet (500 mg total) by mouth daily with breakfast., Disp: 90 tablet, Rfl: 3    pravastatin (PRAVACHOL) 40 MG tablet, Take 1 tablet (40 mg total) by mouth once daily., Disp: 90 tablet, Rfl: 3    hydrOXYzine HCl (ATARAX) 10 MG Tab, Take 1 tablet (10 mg total) by mouth nightly as needed., Disp: 90 tablet, Rfl: 1    Review of Systems   Constitutional: Positive for fatigue. Negative for activity change, appetite change, chills, diaphoresis, fever and unexpected weight change.   HENT: Negative for congestion, dental problem, hearing loss, mouth sores, postnasal drip, rhinorrhea, sinus pressure and trouble swallowing.    Eyes: Negative for pain, discharge and itching.   Respiratory: Negative for apnea, cough, choking, chest tightness, shortness of breath and wheezing.    Cardiovascular: Negative for chest pain, palpitations and leg swelling.   Gastrointestinal: Negative for abdominal distention, abdominal pain, anal bleeding, blood in stool, constipation, diarrhea, nausea, rectal pain and vomiting.   Endocrine: Negative for polydipsia and polyuria.   Genitourinary: Negative for decreased urine  volume, difficulty urinating, dyspareunia, dysuria, enuresis, flank pain, frequency, genital sores, hematuria, menstrual problem, pelvic pain and urgency.   Musculoskeletal: Negative for arthralgias, back pain and myalgias.   Skin: Negative for color change, rash and wound.   Neurological: Positive for headaches. Negative for dizziness, syncope, speech difficulty and light-headedness.   Hematological: Negative for adenopathy. Does not bruise/bleed easily.   Psychiatric/Behavioral: Negative for behavioral problems, confusion and sleep disturbance.       Objective:      Physical Exam   Constitutional: She appears well-developed.   HENT:   Head: Normocephalic.   Neck: Neck supple.   Cardiovascular: Normal rate.    Pulmonary/Chest: Effort normal.   Abdominal: Soft.   Neurological: She is alert.   Skin: Skin is warm.     Psychiatric: She has a normal mood and affect.       Imaging:    CT Abdomen 12/29/18    3.4 cm right adrenal adenoma.    0.5 cm nonspecific solid pulmonary nodule at the left lower lobe (axial series 2, image 18).  For a solid nodule <6 mm, Fleischner Society 2017 guidelines recommend no routine follow up for a low risk patient, or follow-up with non-contrast chest CT at 12 months in a high risk patient. Several nonspecific subcentimeter hepatic hypodensities which are too small to characterize, most likely cysts.    Minimal central pneumobilia possibly due to recent biliary instrumentation. Correlate with surgical history.    Small hiatal hernia.    Diverticulosis coli.    Postsurgical changes of cholecystectomy and hysterectomy with oophorectomy.    Assessment:       1. Adenoma of right adrenal gland        Plan:       Teodora was seen today for advice only.    Diagnoses and all orders for this visit:    Adenoma of right adrenal gland  -     US Retroperitoneal Complete (Kidney and; Future  -     Cortisol, free, serum; Future          Reviewed abdominal CT with patient showing 3.4 cm benign  non-functioning adrenal adenoma that is growing.  Discussed surgical intervention however this isn't warranted at this time. Consider once it is 4-5 cm.  Recommended active surveillance.  All questions and concerns were addressed.  Serum cortisol level and will phone review results with patient.  Refer to Dr. Mal mike.  RTC 6 months with renal US. Consider MRI if US shows functioning gland.    I, Andrew Silverman, am acting as a scribe on this patient encounter in the presence and under the supervision of Dr. Cheng.    01/03/2019 7:05 AM    I, Dr. Cheng, personally performed the services described in this documentation.   All medical record entries made by the scribe were at my direction and in my presence.   I have reviewed the chart and agree that the record is accurate and complete.   Major Cheng MD.  7:28 AM 01/03/2019

## 2019-01-03 NOTE — LETTER
January 3, 2019      Tere Brown MD  [address] [place] [address]  [address]  [place] [address]           [facility]rology 4th Floor  [address]  [place] [address]  Phone: [phone]          Patient: Teodora Yuen   MR Number: [id number]   YOB: 1960   Date of Visit: 1/3/2019       Dear Dr. Tere Brown:    Thank you for referring Teodora Yuen to me for evaluation. Attached you will find relevant portions of my assessment and plan of care.    If you have questions, please do not hesitate to call me. I look forward to following Teodora Yuen along with you.    Sincerely,    Major Cheng Jr., MD    Enclosure  CC:  No Recipients    If you would like to receive this communication electronically, please contact [email or web address] or [phone] to request more information on [employer] Link access.    For providers and/or their staff who would like to refer a patient to [facility], please contact us through our one-stop-shop provider referral line, [facility], at [phone].    If you feel you have received this communication in error or would no longer like to receive these types of communications, please e-mail [email or web address]

## 2019-01-09 ENCOUNTER — PATIENT MESSAGE (OUTPATIENT)
Dept: UROLOGY | Facility: CLINIC | Age: 59
End: 2019-01-09

## 2019-03-10 ENCOUNTER — PATIENT MESSAGE (OUTPATIENT)
Dept: INTERNAL MEDICINE | Facility: CLINIC | Age: 59
End: 2019-03-10

## 2019-03-11 ENCOUNTER — TELEPHONE (OUTPATIENT)
Dept: INTERNAL MEDICINE | Facility: CLINIC | Age: 59
End: 2019-03-11

## 2019-03-11 NOTE — TELEPHONE ENCOUNTER
Please let her know that I do not prescribe Adipex.  Please offer her an appointment with 1 of our weight loss physicians for an appointment  at [facility] [place] or Dr. Campuzano at [facility].

## 2019-03-11 NOTE — TELEPHONE ENCOUNTER
"Informed pt Dr. Brown doesn't prescribe adipex. Offered pt an appt with , pt declined and stated,"she doesn't want to see another Dr.  "

## 2019-06-28 DIAGNOSIS — K21.9 GASTROESOPHAGEAL REFLUX DISEASE, ESOPHAGITIS PRESENCE NOT SPECIFIED: ICD-10-CM

## 2019-06-28 RX ORDER — ESOMEPRAZOLE MAGNESIUM 40 MG/1
40 CAPSULE, DELAYED RELEASE ORAL
Qty: 90 CAPSULE | Refills: 1 | Status: CANCELLED | OUTPATIENT
Start: 2019-06-28

## 2019-07-01 DIAGNOSIS — K21.9 GASTROESOPHAGEAL REFLUX DISEASE, ESOPHAGITIS PRESENCE NOT SPECIFIED: ICD-10-CM

## 2019-07-01 RX ORDER — ESOMEPRAZOLE MAGNESIUM 40 MG/1
40 CAPSULE, DELAYED RELEASE ORAL
Qty: 90 CAPSULE | Refills: 1 | Status: SHIPPED | OUTPATIENT
Start: 2019-07-01 | End: 2020-01-13 | Stop reason: SDUPTHER

## 2019-07-01 RX ORDER — ESOMEPRAZOLE MAGNESIUM 40 MG/1
40 CAPSULE, DELAYED RELEASE ORAL
Qty: 90 CAPSULE | Refills: 1 | Status: CANCELLED | OUTPATIENT
Start: 2019-06-28

## 2019-08-16 DIAGNOSIS — Z12.11 COLON CANCER SCREENING: ICD-10-CM

## 2020-01-10 DIAGNOSIS — Z12.39 BREAST CANCER SCREENING: ICD-10-CM

## 2020-01-13 DIAGNOSIS — E78.5 HYPERLIPIDEMIA, UNSPECIFIED HYPERLIPIDEMIA TYPE: ICD-10-CM

## 2020-01-13 DIAGNOSIS — D50.9 IRON DEFICIENCY ANEMIA, UNSPECIFIED IRON DEFICIENCY ANEMIA TYPE: ICD-10-CM

## 2020-01-13 DIAGNOSIS — R73.03 PREDIABETES: Primary | ICD-10-CM

## 2020-01-13 DIAGNOSIS — K21.9 GASTROESOPHAGEAL REFLUX DISEASE, ESOPHAGITIS PRESENCE NOT SPECIFIED: ICD-10-CM

## 2020-01-13 DIAGNOSIS — Z00.00 ANNUAL PHYSICAL EXAM: ICD-10-CM

## 2020-01-13 RX ORDER — ESOMEPRAZOLE MAGNESIUM 40 MG/1
40 CAPSULE, DELAYED RELEASE ORAL
Qty: 30 CAPSULE | Refills: 0 | Status: SHIPPED | OUTPATIENT
Start: 2020-01-13 | End: 2020-02-03 | Stop reason: SDUPTHER

## 2020-01-20 ENCOUNTER — PATIENT OUTREACH (OUTPATIENT)
Dept: ADMINISTRATIVE | Facility: HOSPITAL | Age: 60
End: 2020-01-20

## 2020-01-20 DIAGNOSIS — E78.5 HYPERLIPIDEMIA, UNSPECIFIED HYPERLIPIDEMIA TYPE: ICD-10-CM

## 2020-01-20 RX ORDER — PRAVASTATIN SODIUM 40 MG/1
40 TABLET ORAL DAILY
Qty: 90 TABLET | Refills: 0 | Status: SHIPPED | OUTPATIENT
Start: 2020-01-20 | End: 2020-02-03 | Stop reason: SDUPTHER

## 2020-01-20 RX ORDER — PRAVASTATIN SODIUM 40 MG/1
40 TABLET ORAL DAILY
Qty: 90 TABLET | Refills: 3 | Status: CANCELLED | OUTPATIENT
Start: 2020-01-20

## 2020-01-21 ENCOUNTER — LAB VISIT (OUTPATIENT)
Dept: LAB | Facility: HOSPITAL | Age: 60
End: 2020-01-21
Attending: FAMILY MEDICINE
Payer: COMMERCIAL

## 2020-01-21 DIAGNOSIS — E78.5 HYPERLIPIDEMIA, UNSPECIFIED HYPERLIPIDEMIA TYPE: ICD-10-CM

## 2020-01-21 DIAGNOSIS — R73.03 PREDIABETES: ICD-10-CM

## 2020-01-21 DIAGNOSIS — K21.9 GASTROESOPHAGEAL REFLUX DISEASE, ESOPHAGITIS PRESENCE NOT SPECIFIED: ICD-10-CM

## 2020-01-21 DIAGNOSIS — D50.9 IRON DEFICIENCY ANEMIA, UNSPECIFIED IRON DEFICIENCY ANEMIA TYPE: ICD-10-CM

## 2020-01-21 DIAGNOSIS — Z00.00 ANNUAL PHYSICAL EXAM: ICD-10-CM

## 2020-01-21 LAB
25(OH)D3+25(OH)D2 SERPL-MCNC: 15 NG/ML (ref 30–96)
ALBUMIN SERPL BCP-MCNC: 3.4 G/DL (ref 3.5–5.2)
ALP SERPL-CCNC: 96 U/L (ref 55–135)
ALT SERPL W/O P-5'-P-CCNC: 12 U/L (ref 10–44)
ANION GAP SERPL CALC-SCNC: 7 MMOL/L (ref 8–16)
AST SERPL-CCNC: 14 U/L (ref 10–40)
BASOPHILS # BLD AUTO: 0.06 K/UL (ref 0–0.2)
BASOPHILS NFR BLD: 0.8 % (ref 0–1.9)
BILIRUB SERPL-MCNC: 0.3 MG/DL (ref 0.1–1)
BUN SERPL-MCNC: 19 MG/DL (ref 6–20)
CALCIUM SERPL-MCNC: 8.8 MG/DL (ref 8.7–10.5)
CHLORIDE SERPL-SCNC: 108 MMOL/L (ref 95–110)
CHOLEST SERPL-MCNC: 178 MG/DL (ref 120–199)
CHOLEST/HDLC SERPL: 3.7 {RATIO} (ref 2–5)
CO2 SERPL-SCNC: 27 MMOL/L (ref 23–29)
CREAT SERPL-MCNC: 0.8 MG/DL (ref 0.5–1.4)
DIFFERENTIAL METHOD: ABNORMAL
EOSINOPHIL # BLD AUTO: 0.1 K/UL (ref 0–0.5)
EOSINOPHIL NFR BLD: 1.3 % (ref 0–8)
ERYTHROCYTE [DISTWIDTH] IN BLOOD BY AUTOMATED COUNT: 16 % (ref 11.5–14.5)
EST. GFR  (AFRICAN AMERICAN): >60 ML/MIN/1.73 M^2
EST. GFR  (NON AFRICAN AMERICAN): >60 ML/MIN/1.73 M^2
ESTIMATED AVG GLUCOSE: 134 MG/DL (ref 68–131)
FERRITIN SERPL-MCNC: 10 NG/ML (ref 20–300)
GLUCOSE SERPL-MCNC: 123 MG/DL (ref 70–110)
HBA1C MFR BLD HPLC: 6.3 % (ref 4–5.6)
HCT VFR BLD AUTO: 38.6 % (ref 37–48.5)
HDLC SERPL-MCNC: 48 MG/DL (ref 40–75)
HDLC SERPL: 27 % (ref 20–50)
HGB BLD-MCNC: 11.2 G/DL (ref 12–16)
IMM GRANULOCYTES # BLD AUTO: 0.04 K/UL (ref 0–0.04)
IMM GRANULOCYTES NFR BLD AUTO: 0.5 % (ref 0–0.5)
IRON SERPL-MCNC: 58 UG/DL (ref 30–160)
LDLC SERPL CALC-MCNC: 107.2 MG/DL (ref 63–159)
LYMPHOCYTES # BLD AUTO: 2.3 K/UL (ref 1–4.8)
LYMPHOCYTES NFR BLD: 29.7 % (ref 18–48)
MAGNESIUM SERPL-MCNC: 2 MG/DL (ref 1.6–2.6)
MCH RBC QN AUTO: 23.5 PG (ref 27–31)
MCHC RBC AUTO-ENTMCNC: 29 G/DL (ref 32–36)
MCV RBC AUTO: 81 FL (ref 82–98)
MONOCYTES # BLD AUTO: 0.5 K/UL (ref 0.3–1)
MONOCYTES NFR BLD: 6.9 % (ref 4–15)
NEUTROPHILS # BLD AUTO: 4.8 K/UL (ref 1.8–7.7)
NEUTROPHILS NFR BLD: 60.8 % (ref 38–73)
NONHDLC SERPL-MCNC: 130 MG/DL
NRBC BLD-RTO: 0 /100 WBC
PLATELET # BLD AUTO: 309 K/UL (ref 150–350)
PMV BLD AUTO: 10.5 FL (ref 9.2–12.9)
POTASSIUM SERPL-SCNC: 4.2 MMOL/L (ref 3.5–5.1)
PROT SERPL-MCNC: 7.3 G/DL (ref 6–8.4)
RBC # BLD AUTO: 4.77 M/UL (ref 4–5.4)
SATURATED IRON: 12 % (ref 20–50)
SODIUM SERPL-SCNC: 142 MMOL/L (ref 136–145)
TOTAL IRON BINDING CAPACITY: 477 UG/DL (ref 250–450)
TRANSFERRIN SERPL-MCNC: 322 MG/DL (ref 200–375)
TRIGL SERPL-MCNC: 114 MG/DL (ref 30–150)
TSH SERPL DL<=0.005 MIU/L-ACNC: 1.81 UIU/ML (ref 0.4–4)
VIT B12 SERPL-MCNC: 365 PG/ML (ref 210–950)
WBC # BLD AUTO: 7.82 K/UL (ref 3.9–12.7)

## 2020-01-21 PROCEDURE — 82728 ASSAY OF FERRITIN: CPT

## 2020-01-21 PROCEDURE — 83540 ASSAY OF IRON: CPT

## 2020-01-21 PROCEDURE — 83036 HEMOGLOBIN GLYCOSYLATED A1C: CPT

## 2020-01-21 PROCEDURE — 82306 VITAMIN D 25 HYDROXY: CPT

## 2020-01-21 PROCEDURE — 36415 COLL VENOUS BLD VENIPUNCTURE: CPT

## 2020-01-21 PROCEDURE — 82607 VITAMIN B-12: CPT

## 2020-01-21 PROCEDURE — 85025 COMPLETE CBC W/AUTO DIFF WBC: CPT

## 2020-01-21 PROCEDURE — 80061 LIPID PANEL: CPT

## 2020-01-21 PROCEDURE — 80053 COMPREHEN METABOLIC PANEL: CPT

## 2020-01-21 PROCEDURE — 83735 ASSAY OF MAGNESIUM: CPT

## 2020-01-21 PROCEDURE — 84443 ASSAY THYROID STIM HORMONE: CPT

## 2020-01-22 ENCOUNTER — LAB VISIT (OUTPATIENT)
Dept: LAB | Facility: HOSPITAL | Age: 60
End: 2020-01-22
Attending: FAMILY MEDICINE
Payer: COMMERCIAL

## 2020-01-22 DIAGNOSIS — Z12.11 COLON CANCER SCREENING: ICD-10-CM

## 2020-01-22 PROCEDURE — 82274 ASSAY TEST FOR BLOOD FECAL: CPT

## 2020-01-23 ENCOUNTER — PATIENT MESSAGE (OUTPATIENT)
Dept: INTERNAL MEDICINE | Facility: CLINIC | Age: 60
End: 2020-01-23

## 2020-01-23 ENCOUNTER — TELEPHONE (OUTPATIENT)
Dept: INTERNAL MEDICINE | Facility: CLINIC | Age: 60
End: 2020-01-23

## 2020-01-23 DIAGNOSIS — D35.01 ADENOMA OF RIGHT ADRENAL GLAND: Primary | ICD-10-CM

## 2020-01-30 LAB — HEMOCCULT STL QL IA: NEGATIVE

## 2020-02-03 ENCOUNTER — OFFICE VISIT (OUTPATIENT)
Dept: INTERNAL MEDICINE | Facility: CLINIC | Age: 60
End: 2020-02-03
Payer: COMMERCIAL

## 2020-02-03 VITALS
HEIGHT: 66 IN | SYSTOLIC BLOOD PRESSURE: 130 MMHG | DIASTOLIC BLOOD PRESSURE: 86 MMHG | TEMPERATURE: 98 F | WEIGHT: 194.69 LBS | OXYGEN SATURATION: 98 % | BODY MASS INDEX: 31.29 KG/M2 | HEART RATE: 82 BPM

## 2020-02-03 DIAGNOSIS — Z00.00 ANNUAL PHYSICAL EXAM: Primary | ICD-10-CM

## 2020-02-03 DIAGNOSIS — K21.9 GASTROESOPHAGEAL REFLUX DISEASE, ESOPHAGITIS PRESENCE NOT SPECIFIED: ICD-10-CM

## 2020-02-03 DIAGNOSIS — D35.01 ADENOMA OF RIGHT ADRENAL GLAND: ICD-10-CM

## 2020-02-03 DIAGNOSIS — M85.80 OSTEOPENIA, UNSPECIFIED LOCATION: ICD-10-CM

## 2020-02-03 DIAGNOSIS — Z80.41 FAMILY HISTORY OF OVARIAN CANCER: ICD-10-CM

## 2020-02-03 DIAGNOSIS — E55.9 VITAMIN D DEFICIENCY: ICD-10-CM

## 2020-02-03 DIAGNOSIS — Z78.0 POST-MENOPAUSAL: ICD-10-CM

## 2020-02-03 DIAGNOSIS — D50.9 IRON DEFICIENCY ANEMIA, UNSPECIFIED IRON DEFICIENCY ANEMIA TYPE: ICD-10-CM

## 2020-02-03 DIAGNOSIS — E78.5 HYPERLIPIDEMIA, UNSPECIFIED HYPERLIPIDEMIA TYPE: ICD-10-CM

## 2020-02-03 DIAGNOSIS — K59.00 CONSTIPATION, UNSPECIFIED CONSTIPATION TYPE: ICD-10-CM

## 2020-02-03 DIAGNOSIS — R73.03 PREDIABETES: ICD-10-CM

## 2020-02-03 PROCEDURE — 99396 PR PREVENTIVE VISIT,EST,40-64: ICD-10-PCS | Mod: S$GLB,,, | Performed by: FAMILY MEDICINE

## 2020-02-03 PROCEDURE — 99396 PREV VISIT EST AGE 40-64: CPT | Mod: S$GLB,,, | Performed by: FAMILY MEDICINE

## 2020-02-03 PROCEDURE — 99999 PR PBB SHADOW E&M-EST. PATIENT-LVL III: ICD-10-PCS | Mod: PBBFAC,,, | Performed by: FAMILY MEDICINE

## 2020-02-03 PROCEDURE — 99999 PR PBB SHADOW E&M-EST. PATIENT-LVL III: CPT | Mod: PBBFAC,,, | Performed by: FAMILY MEDICINE

## 2020-02-03 RX ORDER — ESOMEPRAZOLE MAGNESIUM 40 MG/1
40 CAPSULE, DELAYED RELEASE ORAL
Qty: 90 CAPSULE | Refills: 1 | Status: SHIPPED | OUTPATIENT
Start: 2020-02-03 | End: 2020-08-06 | Stop reason: SDUPTHER

## 2020-02-03 RX ORDER — POLYETHYLENE GLYCOL 3350 17 G/17G
17 POWDER, FOR SOLUTION ORAL DAILY
Qty: 850 G | Refills: 0 | Status: SHIPPED | OUTPATIENT
Start: 2020-02-03 | End: 2021-11-03

## 2020-02-03 RX ORDER — PRAVASTATIN SODIUM 40 MG/1
40 TABLET ORAL DAILY
Qty: 90 TABLET | Refills: 1 | Status: SHIPPED | OUTPATIENT
Start: 2020-02-03 | End: 2020-09-27 | Stop reason: SDUPTHER

## 2020-02-03 NOTE — PROGRESS NOTES
"Subjective:       Patient ID: Teodora Yuen is a 60 y.o. female.    Chief Complaint: Annual Exam    HPI  59 y/o female with Vit D def, hx of Osteopenia, family hx of ovarian ca is here for annual exam.      She is doing well, she is walking and doing elliptical 5 days a week, she is trying to eat healthy.  Sleeping ok using hydroxyzine prn last year used it about 7 times.  She denies f/n/v/d/constipation/cp/palpitations/sob/urinary sx.     Gerd: Dr. Zelaya did EGD 2 years, she has a hiatal hernia, has gastric ulcer in the past,trial of nexium 20 mg was done and she developed breakthrough, nexium 40 mg daily  GYN: no recent pelvic but stacy with bso in 1999 secondary to family hx of ovarian cancer and endometriosis, mmg due  Eye exam utd at [facility]  Dental utd  Vaccine: shingrix discussed  R sided adrenal mass: was seen by endo, has imaging done every 5 years, last was done in 11/2014  Osteopenia: was on Actonel for 6 months in the past, repeat dexa due  Colonoscopy done in 2015 or 2016 Dr. Zelaya    Review of Systems   Constitutional: Negative for activity change, appetite change, fatigue and fever.   Respiratory: Negative for cough and shortness of breath.    Cardiovascular: Negative for chest pain, palpitations and leg swelling.   Gastrointestinal: Negative for constipation, diarrhea, nausea and vomiting.   Genitourinary: Negative for difficulty urinating and dysuria.   Skin: Negative for rash.   Neurological: Negative for dizziness and light-headedness.   Psychiatric/Behavioral: Negative for sleep disturbance.       Objective:      /84 (BP Location: Left arm, Patient Position: Sitting, BP Method: Large (Manual))   Pulse 82   Temp 97.7 °F (36.5 °C)   Ht 5' 6" (1.676 m)   Wt 88.3 kg (194 lb 10.7 oz)   SpO2 98%   BMI 31.42 kg/m²   Physical Exam   Constitutional: She appears well-developed and well-nourished.   HENT:   Head: Normocephalic and atraumatic.   Mouth/Throat: No oropharyngeal exudate.   Neck: " Normal range of motion. Neck supple. No thyromegaly present.   Cardiovascular: Normal rate, regular rhythm and normal heart sounds.   Pulmonary/Chest: Effort normal and breath sounds normal. No respiratory distress.   Abdominal: Soft. Bowel sounds are normal. She exhibits no distension. There is no tenderness.   Musculoskeletal: She exhibits no edema.   Lymphadenopathy:     She has no cervical adenopathy.   Neurological: She is alert.   Skin: Skin is warm and dry.   Psychiatric: She has a normal mood and affect.   Nursing note and vitals reviewed.      Assessment:       1. Annual physical exam    2. Hyperlipidemia, unspecified hyperlipidemia type    3. Gastroesophageal reflux disease, esophagitis presence not specified    4. Prediabetes    5. Iron deficiency anemia, unspecified iron deficiency anemia type    6. Family history of ovarian cancer    7. Adenoma of right adrenal gland    8. Post-menopausal    9. Osteopenia, unspecified location    10. Constipation, unspecified constipation type    11. Vitamin D deficiency        Plan:   Teodora was seen today for annual exam.    Diagnoses and all orders for this visit:    Annual physical exam    Hyperlipidemia, unspecified hyperlipidemia type  -     pravastatin (PRAVACHOL) 40 MG tablet; Take 1 tablet (40 mg total) by mouth once daily.    Gastroesophageal reflux disease, esophagitis presence not specified  -     esomeprazole (NEXIUM) 40 MG capsule; Take 1 capsule (40 mg total) by mouth before breakfast.    Prediabetes  -     Hemoglobin A1c; Future    Iron deficiency anemia, unspecified iron deficiency anemia type  -     Iron and TIBC; Future  -     Ferritin; Future    Family history of ovarian cancer    Adenoma of right adrenal gland    Post-menopausal  -     DXA Bone Density Spine And Hip; Future    Osteopenia, unspecified location  -     DXA Bone Density Spine And Hip; Future    Constipation, unspecified constipation type  -     polyethylene glycol (GLYCOLAX) 17 gram/dose  powder; Take 17 g by mouth once daily.    Vitamin D deficiency  -     Vitamin D; Future

## 2020-02-03 NOTE — PATIENT INSTRUCTIONS
Magnesium Citrate Tablet 400 mg daily, I like [employer]net  Brand on amazon or whole food    Miralax

## 2020-03-05 ENCOUNTER — OFFICE VISIT (OUTPATIENT)
Dept: URGENT CARE | Facility: CLINIC | Age: 60
End: 2020-03-05
Payer: COMMERCIAL

## 2020-03-05 VITALS
HEART RATE: 88 BPM | OXYGEN SATURATION: 98 % | DIASTOLIC BLOOD PRESSURE: 75 MMHG | BODY MASS INDEX: 31.18 KG/M2 | SYSTOLIC BLOOD PRESSURE: 120 MMHG | TEMPERATURE: 98 F | HEIGHT: 66 IN | RESPIRATION RATE: 20 BRPM | WEIGHT: 194 LBS

## 2020-03-05 DIAGNOSIS — J06.9 URI WITH COUGH AND CONGESTION: Primary | ICD-10-CM

## 2020-03-05 DIAGNOSIS — R50.9 FEVER, UNSPECIFIED FEVER CAUSE: ICD-10-CM

## 2020-03-05 LAB
CTP QC/QA: YES
FLUAV AG NPH QL: NEGATIVE
FLUBV AG NPH QL: NEGATIVE

## 2020-03-05 PROCEDURE — 99214 OFFICE O/P EST MOD 30 MIN: CPT | Mod: 25,S$GLB,, | Performed by: NURSE PRACTITIONER

## 2020-03-05 PROCEDURE — 99214 PR OFFICE/OUTPT VISIT, EST, LEVL IV, 30-39 MIN: ICD-10-PCS | Mod: 25,S$GLB,, | Performed by: NURSE PRACTITIONER

## 2020-03-05 PROCEDURE — 87804 INFLUENZA ASSAY W/OPTIC: CPT | Mod: QW,S$GLB,, | Performed by: NURSE PRACTITIONER

## 2020-03-05 PROCEDURE — 87804 POCT INFLUENZA A/B: ICD-10-PCS | Mod: 59,QW,S$GLB, | Performed by: NURSE PRACTITIONER

## 2020-03-05 RX ORDER — PROMETHAZINE HYDROCHLORIDE AND DEXTROMETHORPHAN HYDROBROMIDE 6.25; 15 MG/5ML; MG/5ML
5 SYRUP ORAL NIGHTLY PRN
Qty: 120 ML | Refills: 0 | Status: SHIPPED | OUTPATIENT
Start: 2020-03-05 | End: 2020-03-09 | Stop reason: SDUPTHER

## 2020-03-05 RX ORDER — BENZONATATE 100 MG/1
100 CAPSULE ORAL EVERY 6 HOURS PRN
Qty: 30 CAPSULE | Refills: 0 | Status: SHIPPED | OUTPATIENT
Start: 2020-03-05 | End: 2020-04-04

## 2020-03-05 RX ORDER — FLUTICASONE PROPIONATE 50 MCG
1-2 SPRAY, SUSPENSION (ML) NASAL DAILY
Qty: 18.2 ML | Refills: 0 | Status: SHIPPED | OUTPATIENT
Start: 2020-03-05 | End: 2021-11-03

## 2020-03-05 NOTE — PATIENT INSTRUCTIONS
Use an antihistamine such as Claritin, Zyrtec or Allegra to dry you out.     Take Tessalon Perles every 6 hr as needed during the day for cough suppression.    Take cough syrup nightly.  Will make drowsy used with caution    Use Flonase 1-2 sprays/nostril per day. It is a local acting steroid nasal spray, if you develop a bloody nose, stop using the medication immediately.    Use warm salt water gargles to ease your throat pain. Warm salt water gargles as needed for sore throat-  1/2 tsp salt to 1 cup warm water, gargle as desired.  Hot tea with honey.    Drink plenty of fluids and increase vitamin-C.  Educated on hand hygiene.  Follow-up with PCP if symptoms persist or worsen.          Viral Upper Respiratory Illness (Adult)  You have a viral upper respiratory illness (URI), which is another term for the common cold. This illness is contagious during the first few days. It is spread through the air by coughing and sneezing. It may also be spread by direct contact (touching the sick person and then touching your own eyes, nose, or mouth). Frequent handwashing will decrease risk of spread. Most viral illnesses go away within 7 to 10 days with rest and simple home remedies. Sometimes the illness may last for several weeks. Antibiotics will not kill a virus, and they are generally not prescribed for this condition.    Home care  · If symptoms are severe, rest at home for the first 2 to 3 days. When you resume activity, don't let yourself get too tired.  · Avoid being exposed to cigarette smoke (yours or others).  · You may use acetaminophen or ibuprofen to control pain and fever, unless another medicine was prescribed. (Note: If you have chronic liver or kidney disease, have ever had a stomach ulcer or gastrointestinal bleeding, or are taking blood-thinning medicines, talk with your healthcare provider before using these medicines.) Aspirin should never be given to anyone under 18 years of age who is ill with a viral  infection or fever. It may cause severe liver or brain damage.  · Your appetite may be poor, so a light diet is fine. Avoid dehydration by drinking 6 to 8 glasses of fluids per day (water, soft drinks, juices, tea, or soup). Extra fluids will help loosen secretions in the nose and lungs.  · Over-the-counter cold medicines will not shorten the length of time youre sick, but they may be helpful for the following symptoms: cough, sore throat, and nasal and sinus congestion. (Note: Do not use decongestants if you have high blood pressure.)  Follow-up care  Follow up with your healthcare provider, or as advised.  When to seek medical advice  Call your healthcare provider right away if any of these occur:  · Cough with lots of colored sputum (mucus)  · Severe headache; face, neck, or ear pain  · Difficulty swallowing due to throat pain  · Fever of 100.4°F (38°C)  Call 911, or get immediate medical care  Call emergency services right away if any of these occur:  · Chest pain, shortness of breath, wheezing, or difficulty breathing  · Coughing up blood  · Inability to swallow due to throat pain  Date Last Reviewed: 9/13/2015  © 7860-4007 [employer]. [address] [place] [address], [place] [address]. All rights reserved. This information is not intended as a substitute for professional medical care. Always follow your healthcare professional's instructions.

## 2020-03-05 NOTE — PROGRESS NOTES
"Subjective:       Patient ID: Teodora Yuen is a 60 y.o. female.    Vitals:  height is 5' 6" (1.676 m) and weight is 88 kg (194 lb). Her temperature is 98 °F (36.7 °C). Her blood pressure is 120/75 and her pulse is 88. Her respiration is 20 and oxygen saturation is 98%.     Chief Complaint: URI    Pt has a cough , clear sinus drainage , slight fever and running nose since febuary 21 st. She hs taken mucin ex and robitussin that helps but not fully. Pt states her tempt usually goes up at night to 99 and her niormal body temp is 97.    URI    This is a new problem. The current episode started 1 to 4 weeks ago. The problem has been unchanged. Associated symptoms include congestion and coughing. Pertinent negatives include no chest pain, diarrhea, dysuria, headaches, nausea, rash, sore throat or vomiting. She has tried NSAIDs for the symptoms. The treatment provided mild relief.       Constitution: Negative for chills, fatigue and fever.   HENT: Positive for congestion. Negative for sore throat.    Neck: Negative for painful lymph nodes.   Cardiovascular: Negative for chest pain and leg swelling.   Eyes: Negative for double vision and blurred vision.   Respiratory: Positive for cough. Negative for shortness of breath.    Gastrointestinal: Negative for nausea, vomiting and diarrhea.   Genitourinary: Negative for dysuria, frequency, urgency and history of kidney stones.   Musculoskeletal: Negative for joint pain, joint swelling, muscle cramps and muscle ache.   Skin: Negative for color change, pale, rash and bruising.   Allergic/Immunologic: Negative for seasonal allergies.   Neurological: Negative for dizziness, history of vertigo, light-headedness, passing out and headaches.   Hematologic/Lymphatic: Negative for swollen lymph nodes.   Psychiatric/Behavioral: Negative for nervous/anxious, sleep disturbance and depression. The patient is not nervous/anxious.        Objective:      Physical Exam   Constitutional: She is " oriented to person, place, and time. She appears well-developed and well-nourished. She is cooperative.  Non-toxic appearance. She does not have a sickly appearance. She does not appear ill. No distress.   HENT:   Head: Normocephalic and atraumatic.   Right Ear: Hearing, tympanic membrane, external ear and ear canal normal.   Left Ear: Hearing, tympanic membrane, external ear and ear canal normal.   Nose: Mucosal edema and rhinorrhea present. No nasal deformity. No epistaxis. Right sinus exhibits no maxillary sinus tenderness and no frontal sinus tenderness. Left sinus exhibits no maxillary sinus tenderness and no frontal sinus tenderness.   Mouth/Throat: Uvula is midline, oropharynx is clear and moist and mucous membranes are normal. No trismus in the jaw. Normal dentition. No uvula swelling. Cobblestoning present. No oropharyngeal exudate, posterior oropharyngeal edema or posterior oropharyngeal erythema.   Postnasal drip  Clear nasal discharge   Eyes: Conjunctivae and lids are normal. No scleral icterus.   Neck: Trachea normal, full passive range of motion without pain and phonation normal. Neck supple. No neck rigidity. No edema and no erythema present.   Cardiovascular: Normal rate, regular rhythm, normal heart sounds, intact distal pulses and normal pulses.   Pulmonary/Chest: Effort normal and breath sounds normal. No respiratory distress. She has no decreased breath sounds. She has no rhonchi.   Dry cough present   Abdominal: Normal appearance.   Musculoskeletal: Normal range of motion. She exhibits no edema or deformity.   Neurological: She is alert and oriented to person, place, and time. She exhibits normal muscle tone. Coordination normal.   Skin: Skin is warm, dry, intact, not diaphoretic and not pale.   Psychiatric: She has a normal mood and affect. Her speech is normal and behavior is normal. Judgment and thought content normal. Cognition and memory are normal.   Nursing note and vitals reviewed.         Results for orders placed or performed in visit on 03/05/20   POCT Influenza A/B   Result Value Ref Range    Rapid Influenza A Ag Negative Negative    Rapid Influenza B Ag Negative Negative     Acceptable Yes      Assessment:       1. URI with cough and congestion    2. Fever, unspecified fever cause        Plan:         URI with cough and congestion  -     benzonatate (TESSALON PERLES) 100 MG capsule; Take 1 capsule (100 mg total) by mouth every 6 (six) hours as needed.  Dispense: 30 capsule; Refill: 0  -     fluticasone propionate (FLONASE) 50 mcg/actuation nasal spray; 1-2 sprays ( mcg total) by Each Nostril route once daily.  Dispense: 18.2 mL; Refill: 0  -     promethazine-dextromethorphan (PROMETHAZINE-DM) 6.25-15 mg/5 mL Syrp; Take 5 mLs by mouth nightly as needed.  Dispense: 120 mL; Refill: 0    Fever, unspecified fever cause  -     POCT Influenza A/B      Patient Instructions   Use an antihistamine such as Claritin, Zyrtec or Allegra to dry you out.     Take Tessalon Perles every 6 hr as needed during the day for cough suppression.    Take cough syrup nightly.  Will make drowsy used with caution    Use Flonase 1-2 sprays/nostril per day. It is a local acting steroid nasal spray, if you develop a bloody nose, stop using the medication immediately.    Use warm salt water gargles to ease your throat pain. Warm salt water gargles as needed for sore throat-  1/2 tsp salt to 1 cup warm water, gargle as desired.  Hot tea with honey.    Drink plenty of fluids and increase vitamin-C.  Educated on hand hygiene.  Follow-up with PCP if symptoms persist or worsen.          Viral Upper Respiratory Illness (Adult)  You have a viral upper respiratory illness (URI), which is another term for the common cold. This illness is contagious during the first few days. It is spread through the air by coughing and sneezing. It may also be spread by direct contact (touching the sick person and then touching  your own eyes, nose, or mouth). Frequent handwashing will decrease risk of spread. Most viral illnesses go away within 7 to 10 days with rest and simple home remedies. Sometimes the illness may last for several weeks. Antibiotics will not kill a virus, and they are generally not prescribed for this condition.    Home care  · If symptoms are severe, rest at home for the first 2 to 3 days. When you resume activity, don't let yourself get too tired.  · Avoid being exposed to cigarette smoke (yours or others).  · You may use acetaminophen or ibuprofen to control pain and fever, unless another medicine was prescribed. (Note: If you have chronic liver or kidney disease, have ever had a stomach ulcer or gastrointestinal bleeding, or are taking blood-thinning medicines, talk with your healthcare provider before using these medicines.) Aspirin should never be given to anyone under 18 years of age who is ill with a viral infection or fever. It may cause severe liver or brain damage.  · Your appetite may be poor, so a light diet is fine. Avoid dehydration by drinking 6 to 8 glasses of fluids per day (water, soft drinks, juices, tea, or soup). Extra fluids will help loosen secretions in the nose and lungs.  · Over-the-counter cold medicines will not shorten the length of time youre sick, but they may be helpful for the following symptoms: cough, sore throat, and nasal and sinus congestion. (Note: Do not use decongestants if you have high blood pressure.)  Follow-up care  Follow up with your healthcare provider, or as advised.  When to seek medical advice  Call your healthcare provider right away if any of these occur:  · Cough with lots of colored sputum (mucus)  · Severe headache; face, neck, or ear pain  · Difficulty swallowing due to throat pain  · Fever of 100.4°F (38°C)  Call 911, or get immediate medical care  Call emergency services right away if any of these occur:  · Chest pain, shortness of breath, wheezing, or  difficulty breathing  · Coughing up blood  · Inability to swallow due to throat pain  Date Last Reviewed: 9/13/2015  © 5314-8779 [employer], "[employer]". [address] [place] [address], [place] [address]. All rights reserved. This information is not intended as a substitute for professional medical care. Always follow your healthcare professional's instructions.

## 2020-03-05 NOTE — LETTER
March 5, 2020      [facility] [address] [place] [address]  Phone: [phone]  Fax: [phone]       Patient: Teodora Yuen   YOB: 1960  Date of Visit: 03/05/2020    To Whom It May Concern:    Sintia Yuen  was at [facility] on 03/05/2020. She may return to work/school on 3/6/2020 with no restrictions. If you have any questions or concerns, or if I can be of further assistance, please do not hesitate to contact me.    Sincerely,    Ale Briseno NP

## 2020-03-07 DIAGNOSIS — J06.9 URI WITH COUGH AND CONGESTION: ICD-10-CM

## 2020-03-09 ENCOUNTER — PATIENT MESSAGE (OUTPATIENT)
Dept: INTERNAL MEDICINE | Facility: CLINIC | Age: 60
End: 2020-03-09

## 2020-03-09 DIAGNOSIS — J06.9 URI WITH COUGH AND CONGESTION: ICD-10-CM

## 2020-03-09 RX ORDER — PROMETHAZINE HYDROCHLORIDE AND DEXTROMETHORPHAN HYDROBROMIDE 6.25; 15 MG/5ML; MG/5ML
5 SYRUP ORAL NIGHTLY PRN
Qty: 120 ML | Refills: 0 | Status: SHIPPED | OUTPATIENT
Start: 2020-03-09 | End: 2021-11-03

## 2020-03-09 NOTE — TELEPHONE ENCOUNTER
I sent in a refill of her cough medicine.  Please find out what's going on offer her an urgent appointment if needed.

## 2020-03-10 RX ORDER — PROMETHAZINE HYDROCHLORIDE AND DEXTROMETHORPHAN HYDROBROMIDE 6.25; 15 MG/5ML; MG/5ML
5 SYRUP ORAL NIGHTLY PRN
Qty: 120 ML | Refills: 0 | OUTPATIENT
Start: 2020-03-10

## 2020-03-12 ENCOUNTER — PATIENT MESSAGE (OUTPATIENT)
Dept: INTERNAL MEDICINE | Facility: CLINIC | Age: 60
End: 2020-03-12

## 2020-03-12 NOTE — LETTER
March 16, 2020      [facility]  [address] [place] [address]  Phone: [phone]  Fax: [phone]       Patient: Teodora Yuen   YOB: 1960  Date of Visit: 03/16/2020    To Whom It May Concern:    Sintia Yuen  was at [facility] on 3/5/19, at this visit her flu swab was negative.  If you have any questions or concerns, or if I can be of further assistance, please do not hesitate to contact me.    Sincerely,    Tere Brown MD

## 2020-03-16 ENCOUNTER — PATIENT MESSAGE (OUTPATIENT)
Dept: INTERNAL MEDICINE | Facility: CLINIC | Age: 60
End: 2020-03-16

## 2020-03-26 DIAGNOSIS — J06.9 URI WITH COUGH AND CONGESTION: ICD-10-CM

## 2020-03-27 RX ORDER — FLUTICASONE PROPIONATE 50 MCG
1-2 SPRAY, SUSPENSION (ML) NASAL DAILY
Qty: 16 ML | Refills: 0 | OUTPATIENT
Start: 2020-03-27

## 2020-04-21 DIAGNOSIS — Z01.84 ANTIBODY RESPONSE EXAMINATION: ICD-10-CM

## 2020-04-29 ENCOUNTER — LAB VISIT (OUTPATIENT)
Dept: URGENT CARE | Facility: CLINIC | Age: 60
End: 2020-04-29
Payer: COMMERCIAL

## 2020-04-29 DIAGNOSIS — Z01.84 ANTIBODY RESPONSE EXAMINATION: ICD-10-CM

## 2020-04-29 LAB — SARS-COV-2 IGG SERPLBLD QL IA.RAPID: NEGATIVE

## 2020-04-29 PROCEDURE — 86769 SARS-COV-2 COVID-19 ANTIBODY: CPT

## 2020-07-29 ENCOUNTER — OFFICE VISIT (OUTPATIENT)
Dept: URGENT CARE | Facility: CLINIC | Age: 60
End: 2020-07-29
Payer: COMMERCIAL

## 2020-07-29 VITALS
OXYGEN SATURATION: 95 % | TEMPERATURE: 98 F | RESPIRATION RATE: 16 BRPM | DIASTOLIC BLOOD PRESSURE: 90 MMHG | BODY MASS INDEX: 28.99 KG/M2 | SYSTOLIC BLOOD PRESSURE: 140 MMHG | WEIGHT: 174 LBS | HEIGHT: 65 IN | HEART RATE: 94 BPM

## 2020-07-29 DIAGNOSIS — L03.115 CELLULITIS OF RIGHT LOWER LEG: ICD-10-CM

## 2020-07-29 DIAGNOSIS — Z02.83 ENCOUNTER FOR DRUG SCREENING: ICD-10-CM

## 2020-07-29 DIAGNOSIS — S80.811A ABRASION, RIGHT LOWER LEG, INITIAL ENCOUNTER: Primary | ICD-10-CM

## 2020-07-29 LAB
CTP QC/QA: YES
POC 10 PANEL DRUG SCREEN: NEGATIVE

## 2020-07-29 PROCEDURE — 99203 PR OFFICE/OUTPT VISIT, NEW, LEVL III, 30-44 MIN: ICD-10-PCS | Mod: 25,S$GLB,, | Performed by: PREVENTIVE MEDICINE

## 2020-07-29 PROCEDURE — 99203 OFFICE O/P NEW LOW 30 MIN: CPT | Mod: 25,S$GLB,, | Performed by: PREVENTIVE MEDICINE

## 2020-07-29 PROCEDURE — 80305 POCT RAPID DRUG SCREEN 10 PANEL: ICD-10-PCS | Mod: QW,S$GLB,, | Performed by: PREVENTIVE MEDICINE

## 2020-07-29 PROCEDURE — 80305 DRUG TEST PRSMV DIR OPT OBS: CPT | Mod: QW,S$GLB,, | Performed by: PREVENTIVE MEDICINE

## 2020-07-29 RX ORDER — DOXYCYCLINE 100 MG/1
100 CAPSULE ORAL 2 TIMES DAILY
Qty: 14 CAPSULE | Refills: 0 | Status: SHIPPED | OUTPATIENT
Start: 2020-07-29 | End: 2021-02-17

## 2020-07-29 NOTE — LETTER
[facility]  [address] [facility], [address]  [place] [facility] [address]  Phone: [phone]  Fax: [phone]  [facility]: [email or web address]    Pt Name: Teodora Yuen  Injury Date: 07/21/2020   Employee ID: [id number] Date of Treatment: 07/29/2020   Company: [employer]      Appointment Time:  Arrived: 8:30 AM   Provider: Pk Zarate MD Time Out: 10:34 AM     Office Treatment:   1. Abrasion, right lower leg, initial encounter    2. Cellulitis of right lower leg      Medications Ordered This Encounter   Medications    diptheria-tetanus toxoids 2-2 Lf unit/0.5 mL injection 0.5 mL    doxycycline (VIBRAMYCIN) 100 MG Cap      Patient Instructions: Keep dressing clean/dry/covered, Change bandage only when wet or dirty, Do not apply ointments or creams unless directed    Restrictions: Regular Duty     Return Appointment: 7/31/2020 at 10:00 AM     LATIA

## 2020-07-29 NOTE — PROGRESS NOTES
Subjective:       Patient ID: Teodora Yuen is a 60 y.o. female.    Chief Complaint: Abrasion (Right Knee)    Pt is being seen today for an abrasion on her right knee from 7/21/2020.  She states she was walking on [address] and fell.  Her pain level today is 4/10.  Her last tetanus was in 2015.  KD      Constitution: Negative for fatigue and fever.   HENT: Negative for facial swelling and facial trauma.    Neck: Negative for neck stiffness.   Cardiovascular: Negative for chest trauma.   Eyes: Negative for eye trauma, double vision and blurred vision.   Gastrointestinal: Negative for abdominal trauma, abdominal pain and rectal bleeding.   Genitourinary: Negative for hematuria, missed menses, genital trauma and pelvic pain.   Musculoskeletal: Negative for pain, trauma, joint swelling and abnormal ROM of joint.   Skin: Positive for wound and abrasion. Negative for color change, laceration and bruising.   Neurological: Negative for dizziness, history of vertigo, light-headedness, coordination disturbances, altered mental status and loss of consciousness.   Hematologic/Lymphatic: Negative for history of bleeding disorder.   Psychiatric/Behavioral: Negative for altered mental status.        Objective:      Physical Exam  Vitals signs and nursing note reviewed.   Constitutional:       Appearance: Normal appearance. She is well-developed.   HENT:      Head: Normocephalic and atraumatic.   Eyes:      Pupils: Pupils are equal, round, and reactive to light.   Neck:      Musculoskeletal: Normal range of motion.   Cardiovascular:      Rate and Rhythm: Normal rate.   Pulmonary:      Effort: Pulmonary effort is normal.   Musculoskeletal:      Lumbar back: She exhibits no bony tenderness, no swelling, no edema, no deformity, no laceration, no spasm and normal pulse.      Right lower leg: She exhibits tenderness. She exhibits no laceration.        Legs:       Comments: Patient has abrasion site measuring approximately 2 cm x 5 cm  about the anterior aspect of her right lower leg with eschar evident in approximately 50% of the wound site.  Mild erythema and swelling surrounds wound site without evidence of streaking or other signs of infection.  Patient has no motor or sensory deficits about her right lower extremity with distal pulses equal intact.   Skin:     General: Skin is warm.   Neurological:      Mental Status: She is alert.      Comments: No focal neurologic deficits         Assessment:       1. Abrasion, right lower leg, initial encounter    2. Cellulitis of right lower leg        Plan:       Wound site right lower leg debrided and collagenase applied.  Patient will maintain bandage for 24 hr and remove and cleanse wound with soap and water at that time.  She will be re-evaluated 48 hr.  Medications Ordered This Encounter   Medications    diptheria-tetanus toxoids 2-2 Lf unit/0.5 mL injection 0.5 mL    doxycycline (VIBRAMYCIN) 100 MG Cap     Sig: Take 1 capsule (100 mg total) by mouth 2 (two) times daily.     Dispense:  14 capsule     Refill:  0     Patient Instructions: Keep dressing clean/dry/covered, Change bandage only when wet or dirty, Do not apply ointments or creams unless directed   Restrictions: Regular Duty  Follow up in about 2 days (around 7/31/2020).

## 2020-07-31 ENCOUNTER — OFFICE VISIT (OUTPATIENT)
Dept: URGENT CARE | Facility: CLINIC | Age: 60
End: 2020-07-31
Payer: COMMERCIAL

## 2020-07-31 DIAGNOSIS — L03.115 CELLULITIS OF RIGHT LOWER LEG: ICD-10-CM

## 2020-07-31 DIAGNOSIS — S80.811A ABRASION, RIGHT LOWER LEG, INITIAL ENCOUNTER: Primary | ICD-10-CM

## 2020-07-31 PROCEDURE — 99213 PR OFFICE/OUTPT VISIT, EST, LEVL III, 20-29 MIN: ICD-10-PCS | Mod: S$GLB,,, | Performed by: PREVENTIVE MEDICINE

## 2020-07-31 PROCEDURE — 99213 OFFICE O/P EST LOW 20 MIN: CPT | Mod: S$GLB,,, | Performed by: PREVENTIVE MEDICINE

## 2020-07-31 NOTE — LETTER
[facility]  [address] [facility], [address]  [facility] [place] [facility] [facility] [address]  Phone: [phone]  Fax: [phone]  [facility]: [email or web address]    Pt Name: Teodora Yuen  Injury Date: 07/21/2020   Employee ID: [id number] Date of Treatment: 07/31/2020   Company: [employer]      Appointment Time: 10:00 AM Arrived: 9:39 AM   Provider: Pk Zarate MD Time Out: 10:46 AM     Office Treatment:   1. Abrasion, right lower leg, initial encounter    2. Cellulitis of right lower leg          Patient Instructions: Keep dressing clean/dry/covered, Do not apply ointments or creams unless directed(Wound site debrided in office and collagenase applied)    Restrictions: Regular Duty     Return Appointment: 8/3/2020 at 8:30 AM     KD

## 2020-07-31 NOTE — PROGRESS NOTES
Subjective:       Patient ID: Teodora Yuen is a 60 y.o. female.    Chief Complaint: Knee Injury (RT)    Pt returned to the clinic today for a follow up visit for her RT KNEE. Pt states her injury has improved since her last visit. Pain today 5/10. IJ    Knee Injury  This is a new problem. The current episode started in the past 7 days. The problem occurs constantly. The problem has been gradually improving. Pertinent negatives include no abdominal pain, chest pain, coughing, fatigue, fever, joint swelling, sore throat, vertigo or weakness. Nothing aggravates the symptoms. The treatment provided mild relief.       Constitution: Negative for fatigue and fever.   HENT: Negative for facial swelling, facial trauma and sore throat.    Neck: Negative for neck stiffness.   Cardiovascular: Negative for chest trauma and chest pain.   Eyes: Negative for eye trauma, double vision and blurred vision.   Respiratory: Negative for cough.    Gastrointestinal: Negative for abdominal trauma, abdominal pain and rectal bleeding.   Genitourinary: Negative for hematuria, missed menses, genital trauma and pelvic pain.   Musculoskeletal: Negative for pain, trauma, joint swelling and abnormal ROM of joint.   Skin: Positive for wound and abrasion. Negative for color change, laceration and bruising.   Neurological: Negative for dizziness, history of vertigo, light-headedness, coordination disturbances, altered mental status and loss of consciousness.   Hematologic/Lymphatic: Negative for history of bleeding disorder.   Psychiatric/Behavioral: Negative for altered mental status.        Objective:      Physical Exam  Vitals signs and nursing note reviewed.   Constitutional:       Appearance: Normal appearance. She is well-developed.   HENT:      Head: Normocephalic and atraumatic.   Eyes:      Pupils: Pupils are equal, round, and reactive to light.   Neck:      Musculoskeletal: Normal range of motion.   Cardiovascular:      Rate and Rhythm:  Normal rate.   Pulmonary:      Effort: Pulmonary effort is normal.   Musculoskeletal:      Lumbar back: She exhibits no bony tenderness, no swelling, no edema, no deformity, no laceration, no spasm and normal pulse.      Right lower leg: She exhibits tenderness. She exhibits no laceration.        Legs:       Comments: Patient has abrasion site measuring approximately 2 cm x 5 cm about the anterior aspect of her right lower leg with eschar evident in approximately 50% of the wound site.  Mild erythema and swelling surrounds wound site without evidence of streaking or other signs of infection.  Patient has no motor or sensory deficits about her right lower extremity with distal pulses equal intact.   Skin:     General: Skin is warm.   Neurological:      Mental Status: She is alert.      Comments: No focal neurologic deficits         Assessment:       1. Abrasion, right lower leg, initial encounter    2. Cellulitis of right lower leg        Plan:     patient will continue doxycycline 100 mg b.i.d. and will cleanse wound site after 24 hr, having applied collagenase in the office today.       Patient Instructions: Keep dressing clean/dry/covered, Do not apply ointments or creams unless directed(Wound site debrided in office and collagenase applied)   Restrictions: Regular Duty  Follow up in about 3 days (around 8/3/2020).

## 2020-08-03 ENCOUNTER — OFFICE VISIT (OUTPATIENT)
Dept: URGENT CARE | Facility: CLINIC | Age: 60
End: 2020-08-03
Payer: COMMERCIAL

## 2020-08-03 DIAGNOSIS — L03.115 CELLULITIS OF RIGHT LOWER LEG: ICD-10-CM

## 2020-08-03 DIAGNOSIS — S80.811A ABRASION, RIGHT LOWER LEG, INITIAL ENCOUNTER: Primary | ICD-10-CM

## 2020-08-03 PROCEDURE — 99213 OFFICE O/P EST LOW 20 MIN: CPT | Mod: S$GLB,,, | Performed by: PREVENTIVE MEDICINE

## 2020-08-03 PROCEDURE — 99213 PR OFFICE/OUTPT VISIT, EST, LEVL III, 20-29 MIN: ICD-10-PCS | Mod: S$GLB,,, | Performed by: PREVENTIVE MEDICINE

## 2020-08-03 NOTE — PROGRESS NOTES
Subjective:       Patient ID: Teodora Yuen is a 60 y.o. female.    Chief Complaint: No chief complaint on file.    Pt returned to the clinic today for a follow up visit for her RT KNEE. Pt states her injury has improved since her last visit. She only have pain when trying to take off the knee wrap because it tares the scar back open. Pain today 7/10. IJ    Knee Injury  This is a recurrent problem. The current episode started 1 to 4 weeks ago. The problem occurs constantly. The problem has been gradually improving. Pertinent negatives include no abdominal pain, chest pain, coughing, fatigue, fever, joint swelling, sore throat, vertigo or weakness. Nothing aggravates the symptoms. The treatment provided mild relief.       Constitution: Negative for fatigue and fever.   HENT: Negative for facial swelling, facial trauma and sore throat.    Neck: Negative for neck stiffness.   Cardiovascular: Negative for chest trauma and chest pain.   Eyes: Negative for eye trauma, double vision and blurred vision.   Respiratory: Negative for cough.    Gastrointestinal: Negative for abdominal trauma, abdominal pain and rectal bleeding.   Genitourinary: Negative for hematuria, missed menses, genital trauma and pelvic pain.   Musculoskeletal: Negative for pain, trauma, joint swelling and abnormal ROM of joint.   Skin: Positive for wound and abrasion. Negative for color change, laceration and bruising.   Neurological: Negative for dizziness, history of vertigo, light-headedness, coordination disturbances, altered mental status and loss of consciousness.   Hematologic/Lymphatic: Negative for history of bleeding disorder.   Psychiatric/Behavioral: Negative for altered mental status.        Objective:      Physical Exam  Vitals signs and nursing note reviewed.   Constitutional:       Appearance: Normal appearance. She is well-developed.   HENT:      Head: Normocephalic and atraumatic.   Eyes:      Pupils: Pupils are equal, round, and reactive  to light.   Neck:      Musculoskeletal: Normal range of motion.   Cardiovascular:      Rate and Rhythm: Normal rate.   Pulmonary:      Effort: Pulmonary effort is normal.   Musculoskeletal:      Lumbar back: She exhibits no bony tenderness, no swelling, no edema, no deformity, no laceration, no spasm and normal pulse.      Right lower leg: She exhibits tenderness. She exhibits no laceration.        Legs:       Comments: Much less swelling with healing abrasion site anterior aspect of right lower leg.  Mild erythema and swelling surrounds wound site without evidence of streaking or other signs of infection.  Patient has no motor or sensory deficits about her right lower extremity with distal pulses equal intact.   Skin:     General: Skin is warm.   Neurological:      Mental Status: She is alert.      Comments: No focal neurologic deficits         Assessment:       1. Abrasion, right lower leg, initial encounter    2. Cellulitis of right lower leg        Plan:     patient will be cleansing wound daily with Adaptic dressing.  Antibiotics will be discontinued at this time.       Patient Instructions: Keep dressing clean/dry/covered, Change bandage only when wet or dirty   Restrictions: Regular Duty  Follow up in about 4 days (around 8/7/2020).

## 2020-08-03 NOTE — LETTER
[facility]  [address] [facility], [address]  [place] [address]  Phone: [phone]  Fax: [phone]  [facility]: [email or web address]    Pt Name: Teodora Yuen  Injury Date: 07/21/2020   Employee ID: [id number] Date of Treatment: 08/03/2020   Company: [employer]      Appointment Time: 08:20 AM Arrived: 8:30 AM   Provider: Pk Zarate MD Time Out: 9:25 AM     Office Treatment:   EXAM  REGULAR DUTY     1. Abrasion, right lower leg, initial encounter    2. Cellulitis of right lower leg          Patient Instructions: Keep dressing clean/dry/covered, Change bandage only when wet or dirty    Restrictions: Regular Duty     Return Appointment: 08/07/2020 at 11:00 AM  FRANCISCO

## 2020-08-06 DIAGNOSIS — K21.9 GASTROESOPHAGEAL REFLUX DISEASE, ESOPHAGITIS PRESENCE NOT SPECIFIED: ICD-10-CM

## 2020-08-06 RX ORDER — ESOMEPRAZOLE MAGNESIUM 40 MG/1
40 CAPSULE, DELAYED RELEASE ORAL
Qty: 30 CAPSULE | Refills: 0 | Status: SHIPPED | OUTPATIENT
Start: 2020-08-06 | End: 2020-08-12 | Stop reason: SDUPTHER

## 2020-08-07 ENCOUNTER — OFFICE VISIT (OUTPATIENT)
Dept: URGENT CARE | Facility: CLINIC | Age: 60
End: 2020-08-07
Payer: COMMERCIAL

## 2020-08-07 DIAGNOSIS — S80.811A ABRASION, RIGHT LOWER LEG, INITIAL ENCOUNTER: Primary | ICD-10-CM

## 2020-08-07 DIAGNOSIS — L03.115 CELLULITIS OF RIGHT LOWER LEG: ICD-10-CM

## 2020-08-07 PROCEDURE — 99213 PR OFFICE/OUTPT VISIT, EST, LEVL III, 20-29 MIN: ICD-10-PCS | Mod: S$GLB,,, | Performed by: PREVENTIVE MEDICINE

## 2020-08-07 PROCEDURE — 99213 OFFICE O/P EST LOW 20 MIN: CPT | Mod: S$GLB,,, | Performed by: PREVENTIVE MEDICINE

## 2020-08-07 NOTE — LETTER
[facility]  [address] [facility], [address]  [place] [address]  Phone: [phone]  Fax: [phone]  [facility]: [id number]    Pt Name: Teodora Yuen  Injury Date: 07/21/2020   Employee ID: [id number] Date of Treatment: 08/07/2020   Company: [employer]      Appointment Time: 11:00 AM Arrived: 10:34 AM   Provider: Pk Zarate MD Time Out:  11:31 AM     Office Treatment:   1. Abrasion, right lower leg, initial encounter    2. Cellulitis of right lower leg          Patient Instructions: Keep dressing clean/dry/covered, Change bandage only when wet or dirty    Restrictions: Regular Duty     Return Appointment: 8/28/2020 at 1:00 PM     KD

## 2020-08-07 NOTE — PROGRESS NOTES
Subjective:       Patient ID: Teodora Yuen is a 60 y.o. female.    Chief Complaint: Knee Injury (Right)    Pt is being seen today for a follow up for a right knee laceration from 7/21/2020.  Pt states that the wound is doing much better.  His pain level is 2/10.  KD      Constitution: Negative for fatigue.   HENT: Negative for facial swelling and facial trauma.    Neck: Negative for neck stiffness.   Cardiovascular: Negative for chest trauma.   Eyes: Negative for eye trauma, double vision and blurred vision.   Gastrointestinal: Negative for abdominal trauma, abdominal pain and rectal bleeding.   Genitourinary: Negative for hematuria, missed menses, genital trauma and pelvic pain.   Musculoskeletal: Negative for pain, trauma, joint swelling and abnormal ROM of joint.   Skin: Positive for wound and abrasion. Negative for color change, laceration and bruising.   Neurological: Negative for dizziness, history of vertigo, light-headedness, coordination disturbances, altered mental status and loss of consciousness.   Hematologic/Lymphatic: Negative for history of bleeding disorder.   Psychiatric/Behavioral: Negative for altered mental status.        Objective:      Physical Exam  Vitals signs and nursing note reviewed.   Constitutional:       Appearance: Normal appearance. She is well-developed.   HENT:      Head: Normocephalic and atraumatic.   Eyes:      Pupils: Pupils are equal, round, and reactive to light.   Neck:      Musculoskeletal: Normal range of motion.   Cardiovascular:      Rate and Rhythm: Normal rate.   Pulmonary:      Effort: Pulmonary effort is normal.   Musculoskeletal:      Lumbar back: She exhibits no bony tenderness, no swelling, no edema, no deformity, no laceration, no spasm and normal pulse.      Right lower leg: She exhibits tenderness. She exhibits no laceration.        Legs:       Comments: Much less swelling with healing abrasion site anterior aspect of right lower leg.  Mild erythema and  swelling surrounds wound site without evidence of streaking or other signs of infection.  Patient has no motor or sensory deficits about her right lower extremity with distal pulses equal intact.   Skin:     General: Skin is warm.   Neurological:      Mental Status: She is alert.      Comments: No focal neurologic deficits         Assessment:       1. Abrasion, right lower leg, initial encounter    2. Cellulitis of right lower leg        Plan:     patient will be cleaning wound site right lower leg on a regular basis with soap and water.  She will discontinue all antibiotics at this time and return to clinic in approximately 3 weeks if symptoms have not resolved or abrasion site has not healed.       Patient Instructions: Keep dressing clean/dry/covered, Change bandage only when wet or dirty   Restrictions: Regular Duty  Follow up in about 3 weeks (around 8/28/2020).

## 2020-08-12 DIAGNOSIS — K21.9 GASTROESOPHAGEAL REFLUX DISEASE, ESOPHAGITIS PRESENCE NOT SPECIFIED: ICD-10-CM

## 2020-08-12 RX ORDER — ESOMEPRAZOLE MAGNESIUM 40 MG/1
40 CAPSULE, DELAYED RELEASE ORAL
Qty: 90 CAPSULE | Refills: 0 | Status: SHIPPED | OUTPATIENT
Start: 2020-08-12 | End: 2020-12-29 | Stop reason: SDUPTHER

## 2020-09-02 ENCOUNTER — CLINICAL SUPPORT (OUTPATIENT)
Dept: INTERNAL MEDICINE | Facility: CLINIC | Age: 60
End: 2020-09-02
Payer: COMMERCIAL

## 2020-09-02 DIAGNOSIS — Z00.00 ROUTINE GENERAL MEDICAL EXAMINATION AT A HEALTH CARE FACILITY: Primary | ICD-10-CM

## 2020-09-02 PROCEDURE — 99412 PREVENTIVE COUNSELING GROUP: CPT | Mod: S$GLB,,, | Performed by: DIETITIAN, REGISTERED

## 2020-09-02 PROCEDURE — 99412 PR PREVENT COUNSEL,GROUP,60 MIN: ICD-10-PCS | Mod: S$GLB,,, | Performed by: DIETITIAN, REGISTERED

## 2020-09-09 ENCOUNTER — CLINICAL SUPPORT (OUTPATIENT)
Dept: INTERNAL MEDICINE | Facility: CLINIC | Age: 60
End: 2020-09-09
Payer: COMMERCIAL

## 2020-09-09 DIAGNOSIS — Z00.00 ROUTINE GENERAL MEDICAL EXAMINATION AT A HEALTH CARE FACILITY: Primary | ICD-10-CM

## 2020-09-09 PROCEDURE — 99412 PREVENTIVE COUNSELING GROUP: CPT | Mod: S$GLB,,, | Performed by: DIETITIAN, REGISTERED

## 2020-09-09 PROCEDURE — 99412 PR PREVENT COUNSEL,GROUP,60 MIN: ICD-10-PCS | Mod: S$GLB,,, | Performed by: DIETITIAN, REGISTERED

## 2020-09-23 ENCOUNTER — CLINICAL SUPPORT (OUTPATIENT)
Dept: INTERNAL MEDICINE | Facility: CLINIC | Age: 60
End: 2020-09-23
Payer: COMMERCIAL

## 2020-09-23 DIAGNOSIS — Z00.00 ROUTINE GENERAL MEDICAL EXAMINATION AT A HEALTH CARE FACILITY: Primary | ICD-10-CM

## 2020-09-23 PROCEDURE — 99412 PREVENTIVE COUNSELING GROUP: CPT | Mod: S$GLB,,, | Performed by: DIETITIAN, REGISTERED

## 2020-09-23 PROCEDURE — 99412 PR PREVENT COUNSEL,GROUP,60 MIN: ICD-10-PCS | Mod: S$GLB,,, | Performed by: DIETITIAN, REGISTERED

## 2020-09-27 DIAGNOSIS — E78.5 HYPERLIPIDEMIA, UNSPECIFIED HYPERLIPIDEMIA TYPE: ICD-10-CM

## 2020-09-28 RX ORDER — PRAVASTATIN SODIUM 40 MG/1
40 TABLET ORAL DAILY
Qty: 90 TABLET | Refills: 1 | Status: SHIPPED | OUTPATIENT
Start: 2020-09-28 | End: 2021-06-12 | Stop reason: SDUPTHER

## 2020-09-30 ENCOUNTER — CLINICAL SUPPORT (OUTPATIENT)
Dept: INTERNAL MEDICINE | Facility: CLINIC | Age: 60
End: 2020-09-30
Payer: COMMERCIAL

## 2020-09-30 DIAGNOSIS — Z00.00 ROUTINE GENERAL MEDICAL EXAMINATION AT A HEALTH CARE FACILITY: Primary | ICD-10-CM

## 2020-09-30 PROCEDURE — 99412 PREVENTIVE COUNSELING GROUP: CPT | Mod: S$GLB,,, | Performed by: DIETITIAN, REGISTERED

## 2020-09-30 PROCEDURE — 99412 PR PREVENT COUNSEL,GROUP,60 MIN: ICD-10-PCS | Mod: S$GLB,,, | Performed by: DIETITIAN, REGISTERED

## 2020-10-07 ENCOUNTER — CLINICAL SUPPORT (OUTPATIENT)
Dept: INTERNAL MEDICINE | Facility: CLINIC | Age: 60
End: 2020-10-07
Payer: COMMERCIAL

## 2020-10-07 DIAGNOSIS — Z00.00 ROUTINE GENERAL MEDICAL EXAMINATION AT A HEALTH CARE FACILITY: Primary | ICD-10-CM

## 2020-10-07 PROCEDURE — 99412 PR PREVENT COUNSEL,GROUP,60 MIN: ICD-10-PCS | Mod: S$GLB,,, | Performed by: DIETITIAN, REGISTERED

## 2020-10-07 PROCEDURE — 99412 PREVENTIVE COUNSELING GROUP: CPT | Mod: S$GLB,,, | Performed by: DIETITIAN, REGISTERED

## 2020-10-13 ENCOUNTER — TELEPHONE (OUTPATIENT)
Dept: PRIMARY CARE CLINIC | Facility: OTHER | Age: 60
End: 2020-10-13

## 2020-10-13 ENCOUNTER — CLINICAL SUPPORT (OUTPATIENT)
Dept: URGENT CARE | Facility: CLINIC | Age: 60
End: 2020-10-13
Payer: COMMERCIAL

## 2020-10-13 DIAGNOSIS — R05.9 COUGH: ICD-10-CM

## 2020-10-13 DIAGNOSIS — R68.83 CHILLS: ICD-10-CM

## 2020-10-13 DIAGNOSIS — R50.9 FEVER, UNSPECIFIED FEVER CAUSE: Primary | ICD-10-CM

## 2020-10-13 DIAGNOSIS — R09.89 RUNNY NOSE: ICD-10-CM

## 2020-10-13 DIAGNOSIS — R50.9 FEVER, UNSPECIFIED FEVER CAUSE: ICD-10-CM

## 2020-10-13 DIAGNOSIS — J06.9 URI WITH COUGH AND CONGESTION: ICD-10-CM

## 2020-10-13 DIAGNOSIS — R52 BODY ACHES: ICD-10-CM

## 2020-10-13 LAB
CTP QC/QA: YES
SARS-COV-2 RDRP RESP QL NAA+PROBE: NEGATIVE

## 2020-10-13 PROCEDURE — U0002: ICD-10-PCS | Mod: QW,S$GLB,, | Performed by: INTERNAL MEDICINE

## 2020-10-13 PROCEDURE — U0002 COVID-19 LAB TEST NON-CDC: HCPCS | Mod: QW,S$GLB,, | Performed by: INTERNAL MEDICINE

## 2020-10-13 RX ORDER — PROMETHAZINE HYDROCHLORIDE AND DEXTROMETHORPHAN HYDROBROMIDE 6.25; 15 MG/5ML; MG/5ML
5 SYRUP ORAL NIGHTLY PRN
Qty: 120 ML | Refills: 0 | Status: CANCELLED | OUTPATIENT
Start: 2020-10-13

## 2020-10-14 ENCOUNTER — CLINICAL SUPPORT (OUTPATIENT)
Dept: INTERNAL MEDICINE | Facility: CLINIC | Age: 60
End: 2020-10-14
Payer: COMMERCIAL

## 2020-10-14 DIAGNOSIS — Z00.00 ROUTINE GENERAL MEDICAL EXAMINATION AT A HEALTH CARE FACILITY: Primary | ICD-10-CM

## 2020-10-14 PROCEDURE — 99412 PREVENTIVE COUNSELING GROUP: CPT | Mod: S$GLB,,, | Performed by: DIETITIAN, REGISTERED

## 2020-10-14 PROCEDURE — 99412 PR PREVENT COUNSEL,GROUP,60 MIN: ICD-10-PCS | Mod: S$GLB,,, | Performed by: DIETITIAN, REGISTERED

## 2020-10-15 NOTE — TELEPHONE ENCOUNTER
Please call and find out what symptoms she is having and get more details and offer her a virtual visit

## 2020-10-16 NOTE — TELEPHONE ENCOUNTER
Pt states that she was tested for Covid 19 already and does not have Covid. Pt has a cold and would like a refill on PROMETHAZINE-DM. Informed pt that she should be seen to discuss refill the cold. Pt declined appt and does not want a refill on PROMETHAZINE-DM at this time.

## 2020-10-21 ENCOUNTER — CLINICAL SUPPORT (OUTPATIENT)
Dept: INTERNAL MEDICINE | Facility: CLINIC | Age: 60
End: 2020-10-21
Payer: COMMERCIAL

## 2020-10-21 DIAGNOSIS — Z00.00 ROUTINE GENERAL MEDICAL EXAMINATION AT A HEALTH CARE FACILITY: Primary | ICD-10-CM

## 2020-10-21 PROCEDURE — 99412 PR PREVENT COUNSEL,GROUP,60 MIN: ICD-10-PCS | Mod: S$GLB,,, | Performed by: DIETITIAN, REGISTERED

## 2020-10-21 PROCEDURE — 99412 PREVENTIVE COUNSELING GROUP: CPT | Mod: S$GLB,,, | Performed by: DIETITIAN, REGISTERED

## 2020-11-04 ENCOUNTER — CLINICAL SUPPORT (OUTPATIENT)
Dept: INTERNAL MEDICINE | Facility: CLINIC | Age: 60
End: 2020-11-04
Payer: COMMERCIAL

## 2020-11-04 DIAGNOSIS — Z00.00 ROUTINE GENERAL MEDICAL EXAMINATION AT A HEALTH CARE FACILITY: Primary | ICD-10-CM

## 2020-11-04 PROCEDURE — 99412 PR PREVENT COUNSEL,GROUP,60 MIN: ICD-10-PCS | Mod: S$GLB,,, | Performed by: DIETITIAN, REGISTERED

## 2020-11-04 PROCEDURE — 99412 PREVENTIVE COUNSELING GROUP: CPT | Mod: S$GLB,,, | Performed by: DIETITIAN, REGISTERED

## 2020-11-06 ENCOUNTER — CLINICAL SUPPORT (OUTPATIENT)
Dept: INTERNAL MEDICINE | Facility: CLINIC | Age: 60
End: 2020-11-06
Payer: COMMERCIAL

## 2020-11-06 DIAGNOSIS — Z00.00 ROUTINE GENERAL MEDICAL EXAMINATION AT A HEALTH CARE FACILITY: Primary | ICD-10-CM

## 2020-11-06 PROCEDURE — 99412 PREVENTIVE COUNSELING GROUP: CPT | Mod: S$GLB,,, | Performed by: DIETITIAN, REGISTERED

## 2020-11-06 PROCEDURE — 99412 PR PREVENT COUNSEL,GROUP,60 MIN: ICD-10-PCS | Mod: S$GLB,,, | Performed by: DIETITIAN, REGISTERED

## 2020-11-11 ENCOUNTER — CLINICAL SUPPORT (OUTPATIENT)
Dept: INTERNAL MEDICINE | Facility: CLINIC | Age: 60
End: 2020-11-11
Payer: COMMERCIAL

## 2020-11-11 DIAGNOSIS — Z00.00 ROUTINE GENERAL MEDICAL EXAMINATION AT A HEALTH CARE FACILITY: Primary | ICD-10-CM

## 2020-11-11 PROCEDURE — 99412 PR PREVENT COUNSEL,GROUP,60 MIN: ICD-10-PCS | Mod: S$GLB,,, | Performed by: DIETITIAN, REGISTERED

## 2020-11-11 PROCEDURE — 99412 PREVENTIVE COUNSELING GROUP: CPT | Mod: S$GLB,,, | Performed by: DIETITIAN, REGISTERED

## 2020-11-13 ENCOUNTER — CLINICAL SUPPORT (OUTPATIENT)
Dept: INTERNAL MEDICINE | Facility: CLINIC | Age: 60
End: 2020-11-13
Payer: COMMERCIAL

## 2020-11-13 DIAGNOSIS — Z00.00 ROUTINE GENERAL MEDICAL EXAMINATION AT A HEALTH CARE FACILITY: Primary | ICD-10-CM

## 2020-11-13 PROCEDURE — 99412 PREVENTIVE COUNSELING GROUP: CPT | Mod: S$GLB,,, | Performed by: DIETITIAN, REGISTERED

## 2020-11-13 PROCEDURE — 99412 PR PREVENT COUNSEL,GROUP,60 MIN: ICD-10-PCS | Mod: S$GLB,,, | Performed by: DIETITIAN, REGISTERED

## 2020-11-16 ENCOUNTER — CLINICAL SUPPORT (OUTPATIENT)
Dept: OTHER | Facility: CLINIC | Age: 60
End: 2020-11-16
Payer: COMMERCIAL

## 2020-11-16 DIAGNOSIS — Z00.8 ENCOUNTER FOR OTHER GENERAL EXAMINATION: ICD-10-CM

## 2020-11-18 ENCOUNTER — CLINICAL SUPPORT (OUTPATIENT)
Dept: INTERNAL MEDICINE | Facility: CLINIC | Age: 60
End: 2020-11-18
Payer: COMMERCIAL

## 2020-11-18 VITALS — BODY MASS INDEX: 28.96 KG/M2 | HEIGHT: 65 IN

## 2020-11-18 DIAGNOSIS — Z00.00 ROUTINE GENERAL MEDICAL EXAMINATION AT A HEALTH CARE FACILITY: Primary | ICD-10-CM

## 2020-11-18 LAB
HDLC SERPL-MCNC: 53 MG/DL
POC CHOLESTEROL, LDL (DOCK): 90 MG/DL
POC CHOLESTEROL, TOTAL: 192 MG/DL
POC GLUCOSE, FASTING: 107 MG/DL (ref 60–110)
TRIGL SERPL-MCNC: 246 MG/DL

## 2020-11-18 PROCEDURE — 99412 PR PREVENT COUNSEL,GROUP,60 MIN: ICD-10-PCS | Mod: S$GLB,,, | Performed by: DIETITIAN, REGISTERED

## 2020-11-18 PROCEDURE — 99412 PREVENTIVE COUNSELING GROUP: CPT | Mod: S$GLB,,, | Performed by: DIETITIAN, REGISTERED

## 2020-12-24 ENCOUNTER — IMMUNIZATION (OUTPATIENT)
Dept: INTERNAL MEDICINE | Facility: CLINIC | Age: 60
End: 2020-12-24
Payer: COMMERCIAL

## 2020-12-24 DIAGNOSIS — Z23 NEED FOR VACCINATION: ICD-10-CM

## 2020-12-24 PROCEDURE — 0001A COVID-19, MRNA, LNP-S, PF, 30 MCG/0.3 ML DOSE VACCINE: ICD-10-PCS | Mod: CV19,,, | Performed by: INTERNAL MEDICINE

## 2020-12-24 PROCEDURE — 0001A COVID-19, MRNA, LNP-S, PF, 30 MCG/0.3 ML DOSE VACCINE: CPT | Mod: CV19,,, | Performed by: INTERNAL MEDICINE

## 2020-12-24 PROCEDURE — 91300 COVID-19, MRNA, LNP-S, PF, 30 MCG/0.3 ML DOSE VACCINE: ICD-10-PCS | Mod: ,,, | Performed by: INTERNAL MEDICINE

## 2020-12-24 PROCEDURE — 91300 COVID-19, MRNA, LNP-S, PF, 30 MCG/0.3 ML DOSE VACCINE: CPT | Mod: ,,, | Performed by: INTERNAL MEDICINE

## 2020-12-29 ENCOUNTER — HOSPITAL ENCOUNTER (EMERGENCY)
Facility: HOSPITAL | Age: 60
Discharge: HOME | End: 2020-12-29
Attending: EMERGENCY MEDICINE
Payer: COMMERCIAL

## 2020-12-29 VITALS
DIASTOLIC BLOOD PRESSURE: 76 MMHG | HEIGHT: 65 IN | SYSTOLIC BLOOD PRESSURE: 131 MMHG | TEMPERATURE: 98 F | RESPIRATION RATE: 18 BRPM | HEART RATE: 86 BPM | BODY MASS INDEX: 30.82 KG/M2 | OXYGEN SATURATION: 98 % | WEIGHT: 185 LBS

## 2020-12-29 DIAGNOSIS — K21.9 GASTROESOPHAGEAL REFLUX DISEASE: ICD-10-CM
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
## 2020-12-29 DIAGNOSIS — S62.101A CLOSED FRACTURE OF RIGHT WRIST, INITIAL ENCOUNTER: Primary | ICD-10-CM

## 2020-12-29 PROCEDURE — 99284 EMERGENCY DEPT VISIT MOD MDM: CPT | Mod: 25

## 2020-12-29 PROCEDURE — 29125 APPL SHORT ARM SPLINT STATIC: CPT | Mod: RT

## 2020-12-29 PROCEDURE — 99284 PR EMERGENCY DEPT VISIT,LEVEL IV: ICD-10-PCS | Mod: 25,,, | Performed by: PHYSICIAN ASSISTANT

## 2020-12-29 PROCEDURE — 29125 APPL SHORT ARM SPLINT STATIC: CPT | Mod: RT,,, | Performed by: PHYSICIAN ASSISTANT

## 2020-12-29 PROCEDURE — 25000003 PHARM REV CODE 250: Performed by: PHYSICIAN ASSISTANT

## 2020-12-29 PROCEDURE — 29125 PR APPLY FOREARM SPLINT,STATIC: ICD-10-PCS | Mod: RT,,, | Performed by: PHYSICIAN ASSISTANT

## 2020-12-29 PROCEDURE — 99284 EMERGENCY DEPT VISIT MOD MDM: CPT | Mod: 25,,, | Performed by: PHYSICIAN ASSISTANT

## 2020-12-29 RX ORDER — HYDROCODONE BITARTRATE AND ACETAMINOPHEN 5; 325 MG/1; MG/1
1 TABLET ORAL EVERY 6 HOURS PRN
Qty: 12 TABLET | Refills: 0 | Status: SHIPPED | OUTPATIENT
Start: 2020-12-29 | End: 2021-02-17

## 2020-12-29 RX ORDER — IBUPROFEN 600 MG/1
600 TABLET ORAL
Status: COMPLETED | OUTPATIENT
Start: 2020-12-29 | End: 2020-12-29

## 2020-12-29 RX ORDER — ESOMEPRAZOLE MAGNESIUM 40 MG/1
40 CAPSULE, DELAYED RELEASE ORAL
Qty: 90 CAPSULE | Refills: 0 | Status: SHIPPED | OUTPATIENT
Start: 2020-12-29 | End: 2021-02-11 | Stop reason: SDUPTHER

## 2020-12-29 RX ADMIN — IBUPROFEN 600 MG: 600 TABLET, FILM COATED ORAL at 06:12

## 2020-12-29 NOTE — ED NOTES
Teodora Yuen, an 60 y.o. female presents to the ED after a trip and fall from standing in which she injured the right side of her face, right wrist, right knee, and right ribcage.  Patient states that she turned as she was falling in order to avoid her face but she still hit her face.  Patient denies loss of consciousness.        Review of patient's allergies indicates:   Allergen Reactions    Coconut Hives    Codeine Hives     Chief Complaint   Patient presents with    Fall     [facility]     Past Medical History:   Diagnosis Date    Adrenal mass, right     GERD (gastroesophageal reflux disease)     Hyperlipidemia

## 2020-12-29 NOTE — Clinical Note
"Teodora Man" Alpa was seen and treated in our emergency department on 12/29/2020.  She may return to work on 12/30/2020.  With light duty until cleared by Orthopedist.     If you have any questions or concerns, please don't hesitate to call.      Sylvie Rios PA-C"

## 2020-12-30 ENCOUNTER — PATIENT MESSAGE (OUTPATIENT)
Dept: ORTHOPEDICS | Facility: CLINIC | Age: 60
End: 2020-12-30

## 2020-12-30 ENCOUNTER — PATIENT OUTREACH (OUTPATIENT)
Dept: ADMINISTRATIVE | Facility: OTHER | Age: 60
End: 2020-12-30

## 2020-12-30 ENCOUNTER — TELEPHONE (OUTPATIENT)
Dept: ORTHOPEDICS | Facility: CLINIC | Age: 60
End: 2020-12-30

## 2020-12-30 ENCOUNTER — OFFICE VISIT (OUTPATIENT)
Dept: ORTHOPEDICS | Facility: CLINIC | Age: 60
End: 2020-12-30
Payer: COMMERCIAL

## 2020-12-30 VITALS
HEART RATE: 64 BPM | HEIGHT: 65 IN | WEIGHT: 184.94 LBS | BODY MASS INDEX: 30.81 KG/M2 | DIASTOLIC BLOOD PRESSURE: 78 MMHG | SYSTOLIC BLOOD PRESSURE: 128 MMHG

## 2020-12-30 DIAGNOSIS — S52.514A NONDISPLACED FRACTURE OF RIGHT RADIAL STYLOID PROCESS, INITIAL ENCOUNTER FOR CLOSED FRACTURE: Primary | ICD-10-CM

## 2020-12-30 PROCEDURE — 99204 PR OFFICE/OUTPT VISIT, NEW, LEVL IV, 45-59 MIN: ICD-10-PCS | Mod: 25,S$GLB,, | Performed by: PHYSICIAN ASSISTANT

## 2020-12-30 PROCEDURE — 99204 OFFICE O/P NEW MOD 45 MIN: CPT | Mod: 25,S$GLB,, | Performed by: PHYSICIAN ASSISTANT

## 2020-12-30 PROCEDURE — 1125F PR PAIN SEVERITY QUANTIFIED, PAIN PRESENT: ICD-10-PCS | Mod: S$GLB,,, | Performed by: PHYSICIAN ASSISTANT

## 2020-12-30 PROCEDURE — 1125F AMNT PAIN NOTED PAIN PRSNT: CPT | Mod: S$GLB,,, | Performed by: PHYSICIAN ASSISTANT

## 2020-12-30 PROCEDURE — 99999 PR PBB SHADOW E&M-EST. PATIENT-LVL III: CPT | Mod: PBBFAC,,, | Performed by: PHYSICIAN ASSISTANT

## 2020-12-30 PROCEDURE — 3008F PR BODY MASS INDEX (BMI) DOCUMENTED: ICD-10-PCS | Mod: CPTII,S$GLB,, | Performed by: PHYSICIAN ASSISTANT

## 2020-12-30 PROCEDURE — 99999 PR PBB SHADOW E&M-EST. PATIENT-LVL III: ICD-10-PCS | Mod: PBBFAC,,, | Performed by: PHYSICIAN ASSISTANT

## 2020-12-30 PROCEDURE — 29125 APPL SHORT ARM SPLINT STATIC: CPT | Mod: RT,S$GLB,, | Performed by: PHYSICIAN ASSISTANT

## 2020-12-30 PROCEDURE — 29125 PR APPLY FOREARM SPLINT,STATIC: ICD-10-PCS | Mod: RT,S$GLB,, | Performed by: PHYSICIAN ASSISTANT

## 2020-12-30 PROCEDURE — 3008F BODY MASS INDEX DOCD: CPT | Mod: CPTII,S$GLB,, | Performed by: PHYSICIAN ASSISTANT

## 2020-12-30 NOTE — TELEPHONE ENCOUNTER
----- Message from Elena Null sent at 12/30/2020  8:23 AM CST -----  Contact: BRYANNA DEY [[id number]]  Type: Patient Call BackWho called:BRYANNA DEY [[id number]]What is the request in detail: Pt is calling in regards to a broken R hand/ Fracture of R wrist. She states that she needs to be seen as soon as possible (within the week).Can the clinic reply by [facility]? Yes Would the patient rather a call back or a response via [facility]? Call [facility] call back number: [phone] (mobile)Additional Information:

## 2020-12-30 NOTE — DISCHARGE INSTRUCTIONS
Imaging Results              X-Ray Ribs 2 View Right (Final result)  Result time 12/29/20 21:30:17      Final result by Fer Tompkins MD (12/29/20 21:30:17)                   Impression:      No acute radiographic abnormality.      Electronically signed by: Fer Tompkins  Date:    12/29/2020  Time:    21:30               Narrative:    EXAMINATION:  XR RIBS 2 VIEW RIGHT    CLINICAL HISTORY:  Unspecified fall, initial encounter    TECHNIQUE:  Two views of the right ribs were performed.    COMPARISON:  None    FINDINGS:  No acute rib fractures.  Right hemithorax is clear.    No soft tissue mass is detected.  No osseous destruction.                                       X-Ray Wrist Complete Right (Final result)  Result time 12/29/20 19:50:54      Final result by Rafael Harding MD (12/29/20 19:50:54)                   Impression:      Radial styloid tip acute nondisplaced fracture and suspected triquetral fracture, as above.      Electronically signed by: Rafael Harding MD  Date:    12/29/2020  Time:    19:50               Narrative:    EXAMINATION:  XR HAND COMPLETE 3 VIEW RIGHT; XR WRIST COMPLETE 3 VIEWS RIGHT    CLINICAL HISTORY:  fall; Unspecified fall, initial encounter    TECHNIQUE:  PA, lateral, and oblique views of the right hand and right wrist were performed.    COMPARISON:  None    FINDINGS:  Nondisplaced fracture through the radial styloid tip with extension to the radiocarpal joint interval.  Irregular ossific body dorsal to the carpals on the lateral view which may reflect triquetral fracture.  No dislocation or destructive osseous process noting moderate to advanced degenerative changes at the 1st CMC joint.  Minimal degenerative change elsewhere.  Prominence of the soft tissues overlying the distal radius and dorsal wrist suggesting localized soft tissue swelling/contusion.  No subcutaneous emphysema or radiodense retained foreign body.                                       X-Ray Hand 3 View Right  (Final result)  Result time 12/29/20 19:50:54      Final result by Rafael Harding MD (12/29/20 19:50:54)                   Impression:      Radial styloid tip acute nondisplaced fracture and suspected triquetral fracture, as above.      Electronically signed by: Rafael Harding MD  Date:    12/29/2020  Time:    19:50               Narrative:    EXAMINATION:  XR HAND COMPLETE 3 VIEW RIGHT; XR WRIST COMPLETE 3 VIEWS RIGHT    CLINICAL HISTORY:  fall; Unspecified fall, initial encounter    TECHNIQUE:  PA, lateral, and oblique views of the right hand and right wrist were performed.    COMPARISON:  None    FINDINGS:  Nondisplaced fracture through the radial styloid tip with extension to the radiocarpal joint interval.  Irregular ossific body dorsal to the carpals on the lateral view which may reflect triquetral fracture.  No dislocation or destructive osseous process noting moderate to advanced degenerative changes at the 1st CMC joint.  Minimal degenerative change elsewhere.  Prominence of the soft tissues overlying the distal radius and dorsal wrist suggesting localized soft tissue swelling/contusion.  No subcutaneous emphysema or radiodense retained foreign body.                                    Future Appointments   Date Time Provider Department Center   1/14/2021  7:00 AM COVID VACCINE, [facility] [place] [facility]

## 2020-12-30 NOTE — PROGRESS NOTES
Health Maintenance Due   Topic Date Due    HIV Screening  01/10/1975    Mammogram  01/10/2002     Updates were requested from care everywhere.  Chart was reviewed for overdue Proactive [facility] Encounters (DEE) topics (CRS, Breast Cancer Screening, Eye exam)  Health Maintenance has been updated.  LINKS immunization registry triggered.  Immunizations were reconciled.

## 2020-12-30 NOTE — PROGRESS NOTES
Subjective:      Patient ID: Teodora Yuen is a 60 y.o. female.    Chief Complaint: Pain of the Right Wrist      HPI  Teodora Yuen is a 60 y.o. female presenting today for ED follow up right wrist fracture. Pt had a fall on 12/29/20. She was seen in the ED and placed into a wrist brace. She reports intermittent pain. She is taking Ibuprofen prn, pt picked up her [place] script today. Pain is greatest over the radial styloid. Denies numbness.       Review of patient's allergies indicates:   Allergen Reactions    Coconut Hives         Current Outpatient Medications   Medication Sig Dispense Refill    calcium carbonate-vitamin D3 (CALCIUM 500 WITH D) 500 mg(1,250mg) -400 unit Tab once daily.       esomeprazole (NEXIUM) 40 MG capsule Take 1 capsule (40 mg total) by mouth before breakfast. 90 capsule 0    HYDROcodone-acetaminophen (NORCO) 5-325 mg per tablet Take 1 tablet by mouth every 6 (six) hours as needed for Pain. 12 tablet 0    pravastatin (PRAVACHOL) 40 MG tablet Take 1 tablet (40 mg total) by mouth once daily. 90 tablet 1    doxycycline (VIBRAMYCIN) 100 MG Cap Take 1 capsule (100 mg total) by mouth 2 (two) times daily. 14 capsule 0    ferrous gluconate (FERGON) 324 MG tablet Take 1 tablet (324 mg total) by mouth 3 (three) times daily with meals. (Patient not taking: Reported on 12/30/2020) 90 tablet 4    fluticasone propionate (FLONASE) 50 mcg/actuation nasal spray 1-2 sprays ( mcg total) by Each Nostril route once daily. 18.2 mL 0    hydrOXYzine HCl (ATARAX) 10 MG Tab Take 1 tablet (10 mg total) by mouth nightly as needed. (Patient not taking: Reported on 12/30/2020) 90 tablet 1    metFORMIN (GLUCOPHAGE-XR) 500 MG ER 24hr tablet Take 1 tablet (500 mg total) by mouth daily with breakfast. (Patient not taking: Reported on 12/30/2020) 90 tablet 3    polyethylene glycol (GLYCOLAX) 17 gram/dose powder Take 17 g by mouth once daily. 850 g 0    promethazine-dextromethorphan (PROMETHAZINE-DM) 6.25-15  "mg/5 mL Syrp Take 5 mLs by mouth nightly as needed. (Patient not taking: Reported on 12/30/2020) 120 mL 0     No current facility-administered medications for this visit.        Past Medical History:   Diagnosis Date    Adrenal mass, right     GERD (gastroesophageal reflux disease)     Hyperlipidemia        Past Surgical History:   Procedure Laterality Date    BILE DUCT STENT PLACEMENT      CHOLECYSTECTOMY      HYSTERECTOMY  1999    KRISTIN with BSO secondary to family hx ovarian ca and endometriosis    R ankle surgery Right     toe fracture Left     4th metatarsal       Review of Systems:  Constitutional: Negative for chills and fever.   Respiratory: Negative for cough and shortness of breath.    Gastrointestinal: Negative for nausea and vomiting.   Skin: Negative for rash.   Neurological: Negative for dizziness and headaches.   Psychiatric/Behavioral: Negative for depression.   MSK as in HPI       OBJECTIVE:     PHYSICAL EXAM:  /78   Pulse 64   Ht 5' 5" (1.651 m)   Wt 83.9 kg (184 lb 15.5 oz)   BMI 30.78 kg/m²     GEN:  NAD, well-developed, well-groomed.  NEURO: Awake, alert, and oriented. Normal attention and concentration.    PSYCH: Normal mood and affect. Behavior is normal.  HEENT: No cervical lymphadenopathy noted.  CARDIOVASCULAR: Radial pulses 2+ bilaterally. No LE edema noted.  PULMONARY: Breath sounds normal. No respiratory distress.  SKIN: Intact, no rashes.      MSK:   RUE:  Good finger motion. Wrist motion not fully assessed secondary to pain. She has minimal edema and ecchymosis. She is ttp greatest over the radial styloid. No open wounds or abrasions. AIN/PIN/Radial/Median/Ulnar Nerves assessed in isolation without deficit. Radial & Ulnar arteries palpated 2+. Capillary Refill <3s.    RADIOGRAPHS:  Right wrist 12/29/20  Impression:  Radial styloid tip acute nondisplaced fracture and suspected triquetral fracture, as above.     Comments: I have personally reviewed the imaging and I agree " with the above radiologist's report.    ASSESSMENT/PLAN:       ICD-10-CM ICD-9-CM   1. Nondisplaced fracture of right radial styloid process, initial encounter for closed fracture  S52.514A 813.42       Orders Placed This Encounter    X-Ray Wrist Complete Right     Plan:   xrays reviewed with Dr. De La Fuente  Right thumb spica plaster splint applied today   RTC 1 wk with xray then transition to cast         The patient indicates understanding of these issues and agrees to the plan.    Corie Richard PA-C  [facility]   [facility] [place]

## 2020-12-30 NOTE — TELEPHONE ENCOUNTER
----- Message from Nu Natarajan sent at 12/30/2020  9:28 AM CST -----  Type:  Patient Returning Call    Who Called: pt     Who Left Message for Patient: Janie    Does the patient know what this is regarding?: earlier appt. Pt would like to take offered appt today at 2pm. Not available for me to schedule    Would the patient rather a call back or a response via [facility]? Call back     Best Call Back Number: [phone] (mobile)    Additional Information:     Thank you

## 2020-12-30 NOTE — ED PROVIDER NOTES
Encounter Date: 12/29/2020       History     Chief Complaint   Patient presents with    Fall     right cheek and right wrist fell infront of [facility]     6:23 PM  Patient is a 60-year-old female who presents the ED with right hand and wrist pain and facial pain status post fall prior to arrival.  Patient states that she was walking home from work and fell in the construction area near [facility].  She did not lose consciousness.  She was able to walk home and present herself to this emergency department.  She is complaining of 8/10 pain mainly to her right wrist and hand and mild pain to her face.  Denies any previous history of fractured right upper extremity.  She did not take anything for pain relief.  She has no other complaints or concerns at this time including dental pain.        Review of patient's allergies indicates:   Allergen Reactions    Coconut Hives     Past Medical History:   Diagnosis Date    Adrenal mass, right     GERD (gastroesophageal reflux disease)     Hyperlipidemia      Past Surgical History:   Procedure Laterality Date    BILE DUCT STENT PLACEMENT      CHOLECYSTECTOMY      HYSTERECTOMY  1999    KRISTIN with BSO secondary to family hx ovarian ca and endometriosis    R ankle surgery Right     toe fracture Left     4th metatarsal     Family History   Problem Relation Age of Onset    Cancer Mother         ovarian    Cancer Maternal Grandmother         ovarian     Social History     Tobacco Use    Smoking status: Never Smoker   Substance Use Topics    Alcohol use: Yes     Comment: social    Drug use: Not on file     Review of Systems   Constitutional: Negative for chills and fever.   HENT: Negative for dental problem and sore throat.         +facial pain   Respiratory: Negative for shortness of breath.    Cardiovascular: Negative for chest pain.   Gastrointestinal: Negative for nausea.   Genitourinary: Negative for dysuria.   Musculoskeletal: Positive for arthralgias. Negative for back pain.    Skin: Negative for rash.   Neurological: Negative for weakness.   Hematological: Does not bruise/bleed easily.       Physical Exam     Initial Vitals [12/29/20 1747]   BP Pulse Resp Temp SpO2   (!) 147/86 87 17 98.2 °F (36.8 °C) 98 %      MAP       --         Physical Exam    Vitals reviewed.  Constitutional: She appears well-developed and well-nourished. She is not diaphoretic. She is cooperative.  Non-toxic appearance. She does not have a sickly appearance. She does not appear ill. No distress. Face mask in place.   HENT:   Head: Normocephalic and atraumatic.   Nose: Nose normal.   Eyes: Conjunctivae and EOM are normal. Right conjunctiva is not injected. Right conjunctiva has no hemorrhage. Left conjunctiva is not injected. Left conjunctiva has no hemorrhage. Right eye exhibits no nystagmus. Left eye exhibits no nystagmus.   Face without ecchymosis, abrasions, wounds, or lacerations.  There is generalized tenderness to her right maxillary region.  Intact TMJ.  Dentition without tenderness.  No abnormal oral mucosa lacerations.   Neck: Normal range of motion.   Cardiovascular: Normal rate.   Pulses:       Radial pulses are 2+ on the right side.   Pulmonary/Chest: No respiratory distress.   Abdominal: She exhibits no distension.   Musculoskeletal:      Right shoulder: Normal.      Left shoulder: Normal.      Right elbow: Normal.     Left elbow: Normal.      Right wrist: She exhibits decreased range of motion and bony tenderness.      Left wrist: Normal.      Right hip: Normal.      Left hip: Normal.      Cervical back: She exhibits normal range of motion and no bony tenderness.      Thoracic back: She exhibits normal range of motion and no bony tenderness.      Lumbar back: She exhibits normal range of motion and no bony tenderness.        Right hand: She exhibits decreased range of motion, bony tenderness and swelling.        Left hand: Normal.        Hands:    Neurological: She is alert. No sensory deficit.    Skin: Skin is warm and dry. No abrasion, no rash and no abscess noted. No erythema. No pallor.   Ecchymosis noted to area 1st MCP and 2nd MCP to the dorsal aspect.  Skin intact without abrasions or lacerations.         ED Course   Procedures  Labs Reviewed - No data to display       Imaging Results          X-Ray Ribs 2 View Right (Final result)  Result time 12/29/20 21:30:17    Final result by Fer Tompkins MD (12/29/20 21:30:17)                 Impression:      No acute radiographic abnormality.      Electronically signed by: Fer Tompkins  Date:    12/29/2020  Time:    21:30             Narrative:    EXAMINATION:  XR RIBS 2 VIEW RIGHT    CLINICAL HISTORY:  Unspecified fall, initial encounter    TECHNIQUE:  Two views of the right ribs were performed.    COMPARISON:  None    FINDINGS:  No acute rib fractures.  Right hemithorax is clear.    No soft tissue mass is detected.  No osseous destruction.                               X-Ray Wrist Complete Right (Final result)  Result time 12/29/20 19:50:54    Final result by Rafael Harding MD (12/29/20 19:50:54)                 Impression:      Radial styloid tip acute nondisplaced fracture and suspected triquetral fracture, as above.      Electronically signed by: Rafael Harding MD  Date:    12/29/2020  Time:    19:50             Narrative:    EXAMINATION:  XR HAND COMPLETE 3 VIEW RIGHT; XR WRIST COMPLETE 3 VIEWS RIGHT    CLINICAL HISTORY:  fall; Unspecified fall, initial encounter    TECHNIQUE:  PA, lateral, and oblique views of the right hand and right wrist were performed.    COMPARISON:  None    FINDINGS:  Nondisplaced fracture through the radial styloid tip with extension to the radiocarpal joint interval.  Irregular ossific body dorsal to the carpals on the lateral view which may reflect triquetral fracture.  No dislocation or destructive osseous process noting moderate to advanced degenerative changes at the 1st CMC joint.  Minimal degenerative change elsewhere.   Prominence of the soft tissues overlying the distal radius and dorsal wrist suggesting localized soft tissue swelling/contusion.  No subcutaneous emphysema or radiodense retained foreign body.                               X-Ray Hand 3 View Right (Final result)  Result time 12/29/20 19:50:54    Final result by Rafael Harding MD (12/29/20 19:50:54)                 Impression:      Radial styloid tip acute nondisplaced fracture and suspected triquetral fracture, as above.      Electronically signed by: Rafael Harding MD  Date:    12/29/2020  Time:    19:50             Narrative:    EXAMINATION:  XR HAND COMPLETE 3 VIEW RIGHT; XR WRIST COMPLETE 3 VIEWS RIGHT    CLINICAL HISTORY:  fall; Unspecified fall, initial encounter    TECHNIQUE:  PA, lateral, and oblique views of the right hand and right wrist were performed.    COMPARISON:  None    FINDINGS:  Nondisplaced fracture through the radial styloid tip with extension to the radiocarpal joint interval.  Irregular ossific body dorsal to the carpals on the lateral view which may reflect triquetral fracture.  No dislocation or destructive osseous process noting moderate to advanced degenerative changes at the 1st CMC joint.  Minimal degenerative change elsewhere.  Prominence of the soft tissues overlying the distal radius and dorsal wrist suggesting localized soft tissue swelling/contusion.  No subcutaneous emphysema or radiodense retained foreign body.                                 Medical Decision Making:   Initial Assessment:   Patient is a 60-year-old female who presents the ED with right hand and wrist pain and facial pain status post fall prior to arrival.    Differential Diagnosis:   Includes but is not limited to fractures, dislocations, soft tissue contusion, bony contusion, strain, sprain.  No abrasion or laceration to face.  Mild bony tenderness.  No signs of ocular entrapment.  Likely facial pain due to contusion; I doubt facial fractures or  dislocations.  Clinical Tests:   Radiological Study: Ordered and Reviewed  ED Management:  Will obtain x-rays, give ibuprofen and ice, and reassess.    X-ray shows radial styloid tip acute nondisplaced fracture and suspected triquetral fracture.    Patient updated with results.  There is no significant displacement with her fractures.  She is neurovascularly intact.  Will place in volar wrist splint.  Rest, ice, compression, and elevation.  OTC medication for mild-to-moderate pain.  We discussed proper use of narcotic pain medication for severe pain only.  Follow up closely with [facility].  Return to ED precautions given.  All questions answered.  Patient comfortable with plan and stable for discharge.    I have reviewed patient's chart and discussed this case with my supervising MD.     Sylvie Rios PA-C  Emergent Department  [facility] #[id number] or #[id number]                Attending Attestation:     Physician Attestation Statement for NP/PA:   I discussed this assessment and plan of this patient with the NP/PA, but I did not personally examine the patient. The face to face encounter was performed by the NP/PA.    Other NP/PA Attestation Additions:    History of Present Illness: The patient is a 60 y.o female who presents with an injury sustained from a fall from standing earlier today with no LOC.                             Clinical Impression:       ICD-10-CM ICD-9-CM   1. Closed fracture of right wrist, initial encounter  S62.101A 814.00   2. Fall  W19.XXXA E888.9                      Disposition:   Disposition: Discharged  Condition: Stable     ED Disposition Condition    Discharge Stable        ED Prescriptions     Medication Sig Dispense Start Date End Date Auth. Provider    HYDROcodone-acetaminophen (NORCO) 5-325 mg per tablet Take 1 tablet by mouth every 6 (six) hours as needed for Pain. 12 tablet 12/29/2020  Sylvie Rios PA-C        Follow-up Information     Follow up With  Specialties Details Why Contact Info Additional Information    [facility] Schedule an appointment as soon as possible for a visit   [address], [address]  [facility] [address] [phone] [facility], 9th Floor Please park in [address] and use [address] elevators    [facility]  If symptoms worsen [address]  [facility] [address] [phone]                                        Sylvie Rios PA-C  12/31/20 1116

## 2020-12-31 ENCOUNTER — PATIENT MESSAGE (OUTPATIENT)
Dept: ORTHOPEDICS | Facility: CLINIC | Age: 60
End: 2020-12-31

## 2021-01-02 ENCOUNTER — PATIENT MESSAGE (OUTPATIENT)
Dept: ORTHOPEDICS | Facility: CLINIC | Age: 61
End: 2021-01-02

## 2021-01-02 DIAGNOSIS — S52.514A NONDISPLACED FRACTURE OF RIGHT RADIAL STYLOID PROCESS, INITIAL ENCOUNTER FOR CLOSED FRACTURE: Primary | ICD-10-CM

## 2021-01-04 ENCOUNTER — TELEPHONE (OUTPATIENT)
Dept: ORTHOPEDICS | Facility: CLINIC | Age: 61
End: 2021-01-04

## 2021-01-05 ENCOUNTER — PATIENT MESSAGE (OUTPATIENT)
Dept: ADMINISTRATIVE | Facility: HOSPITAL | Age: 61
End: 2021-01-05

## 2021-01-06 ENCOUNTER — HOSPITAL ENCOUNTER (OUTPATIENT)
Dept: RADIOLOGY | Facility: OTHER | Age: 61
Discharge: HOME | End: 2021-01-06
Attending: PHYSICIAN ASSISTANT
Payer: COMMERCIAL

## 2021-01-06 ENCOUNTER — OFFICE VISIT (OUTPATIENT)
Dept: ORTHOPEDICS | Facility: CLINIC | Age: 61
End: 2021-01-06
Payer: COMMERCIAL

## 2021-01-06 VITALS — SYSTOLIC BLOOD PRESSURE: 136 MMHG | HEART RATE: 81 BPM | DIASTOLIC BLOOD PRESSURE: 91 MMHG

## 2021-01-06 DIAGNOSIS — S52.514A NONDISPLACED FRACTURE OF RIGHT RADIAL STYLOID PROCESS, INITIAL ENCOUNTER FOR CLOSED FRACTURE: ICD-10-CM

## 2021-01-06 DIAGNOSIS — S52.514A NONDISPLACED FRACTURE OF RIGHT RADIAL STYLOID PROCESS, INITIAL ENCOUNTER FOR CLOSED FRACTURE: Primary | ICD-10-CM

## 2021-01-06 PROCEDURE — 73110 X-RAY EXAM OF WRIST: CPT | Mod: TC,FY,RT

## 2021-01-06 PROCEDURE — 73110 X-RAY EXAM OF WRIST: CPT | Mod: 26,RT,, | Performed by: RADIOLOGY

## 2021-01-06 PROCEDURE — 99213 OFFICE O/P EST LOW 20 MIN: CPT | Mod: 25,S$GLB,, | Performed by: PHYSICIAN ASSISTANT

## 2021-01-06 PROCEDURE — 99999 PR PBB SHADOW E&M-EST. PATIENT-LVL II: ICD-10-PCS | Mod: PBBFAC,,, | Performed by: PHYSICIAN ASSISTANT

## 2021-01-06 PROCEDURE — 73110 XR WRIST COMPLETE 3 VIEWS RIGHT: ICD-10-PCS | Mod: 26,RT,, | Performed by: RADIOLOGY

## 2021-01-06 PROCEDURE — 29075 PR APPLY FOREARM CAST: ICD-10-PCS | Mod: RT,S$GLB,, | Performed by: PHYSICIAN ASSISTANT

## 2021-01-06 PROCEDURE — 29075 APPL CST ELBW FNGR SHORT ARM: CPT | Mod: RT,S$GLB,, | Performed by: PHYSICIAN ASSISTANT

## 2021-01-06 PROCEDURE — 99999 PR PBB SHADOW E&M-EST. PATIENT-LVL II: CPT | Mod: PBBFAC,,, | Performed by: PHYSICIAN ASSISTANT

## 2021-01-06 PROCEDURE — 99213 PR OFFICE/OUTPT VISIT, EST, LEVL III, 20-29 MIN: ICD-10-PCS | Mod: 25,S$GLB,, | Performed by: PHYSICIAN ASSISTANT

## 2021-01-06 RX ORDER — TRAMADOL HYDROCHLORIDE 50 MG/1
50 TABLET ORAL EVERY 6 HOURS PRN
Qty: 20 TABLET | Refills: 0 | Status: SHIPPED | OUTPATIENT
Start: 2021-01-06 | End: 2021-02-17

## 2021-01-14 ENCOUNTER — IMMUNIZATION (OUTPATIENT)
Dept: INTERNAL MEDICINE | Facility: CLINIC | Age: 61
End: 2021-01-14
Payer: COMMERCIAL

## 2021-01-14 ENCOUNTER — HOSPITAL ENCOUNTER (OUTPATIENT)
Dept: RADIOLOGY | Facility: HOSPITAL | Age: 61
Discharge: HOME | End: 2021-01-14
Attending: PHYSICIAN ASSISTANT
Payer: COMMERCIAL

## 2021-01-14 ENCOUNTER — PATIENT MESSAGE (OUTPATIENT)
Dept: ORTHOPEDICS | Facility: CLINIC | Age: 61
End: 2021-01-14

## 2021-01-14 ENCOUNTER — TELEPHONE (OUTPATIENT)
Dept: ORTHOPEDICS | Facility: CLINIC | Age: 61
End: 2021-01-14

## 2021-01-14 DIAGNOSIS — S52.514A NONDISPLACED FRACTURE OF RIGHT RADIAL STYLOID PROCESS, INITIAL ENCOUNTER FOR CLOSED FRACTURE: ICD-10-CM

## 2021-01-14 DIAGNOSIS — S52.514A NONDISPLACED FRACTURE OF RIGHT RADIAL STYLOID PROCESS, INITIAL ENCOUNTER FOR CLOSED FRACTURE: Primary | ICD-10-CM

## 2021-01-14 DIAGNOSIS — Z23 NEED FOR VACCINATION: ICD-10-CM

## 2021-01-14 PROCEDURE — 91300 COVID-19, MRNA, LNP-S, PF, 30 MCG/0.3 ML DOSE VACCINE: CPT | Mod: PBBFAC | Performed by: INTERNAL MEDICINE

## 2021-01-14 PROCEDURE — 73110 X-RAY EXAM OF WRIST: CPT | Mod: TC,RT

## 2021-01-14 PROCEDURE — 73110 X-RAY EXAM OF WRIST: CPT | Mod: 26,RT,, | Performed by: RADIOLOGY

## 2021-01-14 PROCEDURE — 0002A COVID-19, MRNA, LNP-S, PF, 30 MCG/0.3 ML DOSE VACCINE: CPT | Mod: PBBFAC | Performed by: INTERNAL MEDICINE

## 2021-01-14 PROCEDURE — 73110 XR WRIST COMPLETE 3 VIEWS RIGHT: ICD-10-PCS | Mod: 26,RT,, | Performed by: RADIOLOGY

## 2021-01-18 ENCOUNTER — PATIENT MESSAGE (OUTPATIENT)
Dept: ORTHOPEDICS | Facility: CLINIC | Age: 61
End: 2021-01-18

## 2021-01-22 ENCOUNTER — OFFICE VISIT (OUTPATIENT)
Dept: ORTHOPEDICS | Facility: CLINIC | Age: 61
End: 2021-01-22
Payer: COMMERCIAL

## 2021-01-22 DIAGNOSIS — S52.514A NONDISPLACED FRACTURE OF RIGHT RADIAL STYLOID PROCESS, INITIAL ENCOUNTER FOR CLOSED FRACTURE: Primary | ICD-10-CM

## 2021-01-22 PROCEDURE — 29075 PR APPLY FOREARM CAST: ICD-10-PCS | Mod: 58,RT,S$GLB, | Performed by: PHYSICIAN ASSISTANT

## 2021-01-22 PROCEDURE — 29075 APPL CST ELBW FNGR SHORT ARM: CPT | Mod: 58,RT,S$GLB, | Performed by: PHYSICIAN ASSISTANT

## 2021-01-22 PROCEDURE — 99499 NO LOS: ICD-10-PCS | Mod: S$GLB,,, | Performed by: PHYSICIAN ASSISTANT

## 2021-01-22 PROCEDURE — 99499 UNLISTED E&M SERVICE: CPT | Mod: S$GLB,,, | Performed by: PHYSICIAN ASSISTANT

## 2021-01-22 PROCEDURE — 99999 PR PBB SHADOW E&M-EST. PATIENT-LVL I: ICD-10-PCS | Mod: PBBFAC,,, | Performed by: PHYSICIAN ASSISTANT

## 2021-01-22 PROCEDURE — 99999 PR PBB SHADOW E&M-EST. PATIENT-LVL I: CPT | Mod: PBBFAC,,, | Performed by: PHYSICIAN ASSISTANT

## 2021-01-29 ENCOUNTER — PATIENT MESSAGE (OUTPATIENT)
Dept: ADMINISTRATIVE | Facility: HOSPITAL | Age: 61
End: 2021-01-29

## 2021-02-05 DIAGNOSIS — Z12.11 COLON CANCER SCREENING: ICD-10-CM

## 2021-02-11 DIAGNOSIS — K21.9 GASTROESOPHAGEAL REFLUX DISEASE: ICD-10-CM

## 2021-02-11 RX ORDER — ESOMEPRAZOLE MAGNESIUM 40 MG/1
40 CAPSULE, DELAYED RELEASE ORAL
Qty: 90 CAPSULE | Refills: 0 | Status: SHIPPED | OUTPATIENT
Start: 2021-02-11 | End: 2021-06-12 | Stop reason: SDUPTHER

## 2021-02-12 ENCOUNTER — TELEPHONE (OUTPATIENT)
Dept: ORTHOPEDICS | Facility: CLINIC | Age: 61
End: 2021-02-12

## 2021-02-15 ENCOUNTER — PATIENT OUTREACH (OUTPATIENT)
Dept: ADMINISTRATIVE | Facility: OTHER | Age: 61
End: 2021-02-15

## 2021-02-15 DIAGNOSIS — Z12.31 BREAST CANCER SCREENING BY MAMMOGRAM: Primary | ICD-10-CM

## 2021-02-17 ENCOUNTER — OFFICE VISIT (OUTPATIENT)
Dept: ORTHOPEDICS | Facility: CLINIC | Age: 61
End: 2021-02-17
Payer: COMMERCIAL

## 2021-02-17 ENCOUNTER — HOSPITAL ENCOUNTER (OUTPATIENT)
Dept: RADIOLOGY | Facility: OTHER | Age: 61
Discharge: HOME | End: 2021-02-17
Attending: PHYSICIAN ASSISTANT
Payer: COMMERCIAL

## 2021-02-17 VITALS — BODY MASS INDEX: 30.66 KG/M2 | HEIGHT: 65 IN | WEIGHT: 184 LBS

## 2021-02-17 DIAGNOSIS — S52.514A NONDISPLACED FRACTURE OF RIGHT RADIAL STYLOID PROCESS, INITIAL ENCOUNTER FOR CLOSED FRACTURE: Primary | ICD-10-CM

## 2021-02-17 DIAGNOSIS — R52 PAIN: Primary | ICD-10-CM

## 2021-02-17 DIAGNOSIS — S52.514A NONDISPLACED FRACTURE OF RIGHT RADIAL STYLOID PROCESS, INITIAL ENCOUNTER FOR CLOSED FRACTURE: ICD-10-CM

## 2021-02-17 PROCEDURE — 73110 X-RAY EXAM OF WRIST: CPT | Mod: TC,FY,RT

## 2021-02-17 PROCEDURE — 73110 X-RAY EXAM OF WRIST: CPT | Mod: 26,RT,, | Performed by: SPECIALIST

## 2021-02-17 PROCEDURE — 99999 PR PBB SHADOW E&M-EST. PATIENT-LVL III: CPT | Mod: PBBFAC,,, | Performed by: PHYSICIAN ASSISTANT

## 2021-02-17 PROCEDURE — 99999 PR PBB SHADOW E&M-EST. PATIENT-LVL III: ICD-10-PCS | Mod: PBBFAC,,, | Performed by: PHYSICIAN ASSISTANT

## 2021-02-17 PROCEDURE — 73110 XR WRIST COMPLETE 3 VIEWS RIGHT: ICD-10-PCS | Mod: 26,RT,, | Performed by: SPECIALIST

## 2021-02-17 PROCEDURE — 3008F PR BODY MASS INDEX (BMI) DOCUMENTED: ICD-10-PCS | Mod: CPTII,S$GLB,, | Performed by: PHYSICIAN ASSISTANT

## 2021-02-17 PROCEDURE — 3008F BODY MASS INDEX DOCD: CPT | Mod: CPTII,S$GLB,, | Performed by: PHYSICIAN ASSISTANT

## 2021-02-17 PROCEDURE — 99213 OFFICE O/P EST LOW 20 MIN: CPT | Mod: S$GLB,,, | Performed by: PHYSICIAN ASSISTANT

## 2021-02-17 PROCEDURE — 1125F PR PAIN SEVERITY QUANTIFIED, PAIN PRESENT: ICD-10-PCS | Mod: S$GLB,,, | Performed by: PHYSICIAN ASSISTANT

## 2021-02-17 PROCEDURE — 1125F AMNT PAIN NOTED PAIN PRSNT: CPT | Mod: S$GLB,,, | Performed by: PHYSICIAN ASSISTANT

## 2021-02-17 PROCEDURE — 99213 PR OFFICE/OUTPT VISIT, EST, LEVL III, 20-29 MIN: ICD-10-PCS | Mod: S$GLB,,, | Performed by: PHYSICIAN ASSISTANT

## 2021-03-19 DIAGNOSIS — Z12.31 OTHER SCREENING MAMMOGRAM: ICD-10-CM

## 2021-04-05 ENCOUNTER — PATIENT MESSAGE (OUTPATIENT)
Dept: ADMINISTRATIVE | Facility: HOSPITAL | Age: 61
End: 2021-04-05

## 2021-04-08 ENCOUNTER — CLINICAL SUPPORT (OUTPATIENT)
Dept: OTHER | Facility: CLINIC | Age: 61
End: 2021-04-08
Payer: COMMERCIAL

## 2021-04-08 DIAGNOSIS — Z00.8 ENCOUNTER FOR OTHER GENERAL EXAMINATION: ICD-10-CM

## 2021-04-09 VITALS — HEIGHT: 65 IN | BODY MASS INDEX: 30.62 KG/M2

## 2021-04-09 LAB
HDLC SERPL-MCNC: 54 MG/DL
POC CHOLESTEROL, LDL (DOCK): 102 MG/DL
POC CHOLESTEROL, TOTAL: 181 MG/DL
POC GLUCOSE, FASTING: 94 MG/DL (ref 60–110)
TRIGL SERPL-MCNC: 125 MG/DL

## 2021-06-12 DIAGNOSIS — R73.03 PREDIABETES: ICD-10-CM

## 2021-06-12 DIAGNOSIS — E78.5 HYPERLIPIDEMIA, UNSPECIFIED HYPERLIPIDEMIA TYPE: ICD-10-CM

## 2021-06-12 DIAGNOSIS — Z00.00 ANNUAL PHYSICAL EXAM: Primary | ICD-10-CM

## 2021-06-12 DIAGNOSIS — K21.9 GASTROESOPHAGEAL REFLUX DISEASE: ICD-10-CM

## 2021-06-12 DIAGNOSIS — K21.9 GASTROESOPHAGEAL REFLUX DISEASE, UNSPECIFIED WHETHER ESOPHAGITIS PRESENT: ICD-10-CM

## 2021-06-14 RX ORDER — ESOMEPRAZOLE MAGNESIUM 40 MG/1
40 CAPSULE, DELAYED RELEASE ORAL
Qty: 90 CAPSULE | Refills: 1 | Status: SHIPPED | OUTPATIENT
Start: 2021-06-14 | End: 2021-09-01 | Stop reason: SDUPTHER

## 2021-06-14 RX ORDER — PRAVASTATIN SODIUM 40 MG/1
40 TABLET ORAL DAILY
Qty: 30 TABLET | Refills: 0 | Status: SHIPPED | OUTPATIENT
Start: 2021-06-14 | End: 2021-09-01 | Stop reason: SDUPTHER

## 2021-07-07 ENCOUNTER — PATIENT MESSAGE (OUTPATIENT)
Dept: ADMINISTRATIVE | Facility: HOSPITAL | Age: 61
End: 2021-07-07

## 2021-08-09 ENCOUNTER — PATIENT OUTREACH (OUTPATIENT)
Dept: ADMINISTRATIVE | Facility: HOSPITAL | Age: 61
End: 2021-08-09

## 2021-08-10 ENCOUNTER — PATIENT MESSAGE (OUTPATIENT)
Dept: INTERNAL MEDICINE | Facility: CLINIC | Age: 61
End: 2021-08-10

## 2021-08-11 ENCOUNTER — PATIENT MESSAGE (OUTPATIENT)
Dept: INTERNAL MEDICINE | Facility: CLINIC | Age: 61
End: 2021-08-11

## 2021-08-11 ENCOUNTER — LAB VISIT (OUTPATIENT)
Dept: INTERNAL MEDICINE | Facility: CLINIC | Age: 61
End: 2021-08-11

## 2021-08-11 DIAGNOSIS — R52 BODY ACHES: ICD-10-CM

## 2021-08-11 DIAGNOSIS — R05.9 COUGH: Primary | ICD-10-CM

## 2021-08-11 DIAGNOSIS — R68.83 CHILLS: ICD-10-CM

## 2021-08-11 DIAGNOSIS — R68.83 CHILLS: Primary | ICD-10-CM

## 2021-08-11 DIAGNOSIS — R09.81 NASAL CONGESTION: ICD-10-CM

## 2021-08-11 LAB — SARS-COV-2 RDRP RESP QL NAA+PROBE: NEGATIVE

## 2021-08-11 PROCEDURE — U0002 COVID-19 LAB TEST NON-CDC: HCPCS | Performed by: PREVENTIVE MEDICINE

## 2021-08-13 DIAGNOSIS — R05.9 COUGH: Primary | ICD-10-CM

## 2021-08-13 DIAGNOSIS — R09.81 NASAL CONGESTION: ICD-10-CM

## 2021-08-13 DIAGNOSIS — R52 BODY ACHES: ICD-10-CM

## 2021-08-25 ENCOUNTER — LAB VISIT (OUTPATIENT)
Dept: LAB | Facility: HOSPITAL | Age: 61
End: 2021-08-25
Attending: FAMILY MEDICINE
Payer: COMMERCIAL

## 2021-08-25 DIAGNOSIS — Z00.00 ANNUAL PHYSICAL EXAM: ICD-10-CM

## 2021-08-25 DIAGNOSIS — R73.03 PREDIABETES: ICD-10-CM

## 2021-08-25 DIAGNOSIS — K21.9 GASTROESOPHAGEAL REFLUX DISEASE, UNSPECIFIED WHETHER ESOPHAGITIS PRESENT: ICD-10-CM

## 2021-08-25 LAB
25(OH)D3+25(OH)D2 SERPL-MCNC: 22 NG/ML (ref 30–96)
ALBUMIN SERPL BCP-MCNC: 3.5 G/DL (ref 3.5–5.2)
ALP SERPL-CCNC: 94 U/L (ref 55–135)
ALT SERPL W/O P-5'-P-CCNC: 17 U/L (ref 10–44)
ANION GAP SERPL CALC-SCNC: 10 MMOL/L (ref 8–16)
AST SERPL-CCNC: 16 U/L (ref 10–40)
BASOPHILS # BLD AUTO: 0.07 K/UL (ref 0–0.2)
BASOPHILS NFR BLD: 0.8 % (ref 0–1.9)
BILIRUB SERPL-MCNC: 0.5 MG/DL (ref 0.1–1)
BUN SERPL-MCNC: 15 MG/DL (ref 6–20)
CALCIUM SERPL-MCNC: 9.4 MG/DL (ref 8.7–10.5)
CHLORIDE SERPL-SCNC: 106 MMOL/L (ref 95–110)
CO2 SERPL-SCNC: 24 MMOL/L (ref 23–29)
CREAT SERPL-MCNC: 0.8 MG/DL (ref 0.5–1.4)
DIFFERENTIAL METHOD: ABNORMAL
EOSINOPHIL # BLD AUTO: 0.1 K/UL (ref 0–0.5)
EOSINOPHIL NFR BLD: 1.4 % (ref 0–8)
ERYTHROCYTE [DISTWIDTH] IN BLOOD BY AUTOMATED COUNT: 13.9 % (ref 11.5–14.5)
EST. GFR  (AFRICAN AMERICAN): >60 ML/MIN/1.73 M^2
EST. GFR  (NON AFRICAN AMERICAN): >60 ML/MIN/1.73 M^2
ESTIMATED AVG GLUCOSE: 134 MG/DL (ref 68–131)
GLUCOSE SERPL-MCNC: 119 MG/DL (ref 70–110)
HBA1C MFR BLD: 6.3 % (ref 4–5.6)
HCT VFR BLD AUTO: 40.6 % (ref 37–48.5)
HGB BLD-MCNC: 12.9 G/DL (ref 12–16)
IMM GRANULOCYTES # BLD AUTO: 0.04 K/UL (ref 0–0.04)
IMM GRANULOCYTES NFR BLD AUTO: 0.5 % (ref 0–0.5)
LYMPHOCYTES # BLD AUTO: 2.9 K/UL (ref 1–4.8)
LYMPHOCYTES NFR BLD: 33.3 % (ref 18–48)
MAGNESIUM SERPL-MCNC: 2 MG/DL (ref 1.6–2.6)
MCH RBC QN AUTO: 26.6 PG (ref 27–31)
MCHC RBC AUTO-ENTMCNC: 31.8 G/DL (ref 32–36)
MCV RBC AUTO: 84 FL (ref 82–98)
MONOCYTES # BLD AUTO: 0.5 K/UL (ref 0.3–1)
MONOCYTES NFR BLD: 6.3 % (ref 4–15)
NEUTROPHILS # BLD AUTO: 5 K/UL (ref 1.8–7.7)
NEUTROPHILS NFR BLD: 57.7 % (ref 38–73)
NRBC BLD-RTO: 0 /100 WBC
PLATELET # BLD AUTO: 300 K/UL (ref 150–450)
PMV BLD AUTO: 10.6 FL (ref 9.2–12.9)
POTASSIUM SERPL-SCNC: 4.1 MMOL/L (ref 3.5–5.1)
PROT SERPL-MCNC: 7.4 G/DL (ref 6–8.4)
RBC # BLD AUTO: 4.85 M/UL (ref 4–5.4)
SODIUM SERPL-SCNC: 140 MMOL/L (ref 136–145)
TSH SERPL DL<=0.005 MIU/L-ACNC: 2.14 UIU/ML (ref 0.4–4)
VIT B12 SERPL-MCNC: 367 PG/ML (ref 210–950)
WBC # BLD AUTO: 8.61 K/UL (ref 3.9–12.7)

## 2021-08-25 PROCEDURE — 82306 VITAMIN D 25 HYDROXY: CPT | Performed by: FAMILY MEDICINE

## 2021-08-25 PROCEDURE — 36415 COLL VENOUS BLD VENIPUNCTURE: CPT | Performed by: FAMILY MEDICINE

## 2021-08-25 PROCEDURE — 85025 COMPLETE CBC W/AUTO DIFF WBC: CPT | Performed by: FAMILY MEDICINE

## 2021-08-25 PROCEDURE — 84443 ASSAY THYROID STIM HORMONE: CPT | Performed by: FAMILY MEDICINE

## 2021-08-25 PROCEDURE — 82607 VITAMIN B-12: CPT | Performed by: FAMILY MEDICINE

## 2021-08-25 PROCEDURE — 83036 HEMOGLOBIN GLYCOSYLATED A1C: CPT | Performed by: FAMILY MEDICINE

## 2021-08-25 PROCEDURE — 83735 ASSAY OF MAGNESIUM: CPT | Performed by: FAMILY MEDICINE

## 2021-08-25 PROCEDURE — 80053 COMPREHEN METABOLIC PANEL: CPT | Performed by: FAMILY MEDICINE

## 2021-08-26 ENCOUNTER — PATIENT MESSAGE (OUTPATIENT)
Dept: INTERNAL MEDICINE | Facility: CLINIC | Age: 61
End: 2021-08-26

## 2021-09-01 ENCOUNTER — PATIENT MESSAGE (OUTPATIENT)
Dept: INTERNAL MEDICINE | Facility: CLINIC | Age: 61
End: 2021-09-01

## 2021-09-01 DIAGNOSIS — E78.5 HYPERLIPIDEMIA, UNSPECIFIED HYPERLIPIDEMIA TYPE: ICD-10-CM

## 2021-09-01 DIAGNOSIS — K21.9 GASTROESOPHAGEAL REFLUX DISEASE: ICD-10-CM

## 2021-09-01 RX ORDER — ESOMEPRAZOLE MAGNESIUM 40 MG/1
40 CAPSULE, DELAYED RELEASE ORAL
Qty: 90 CAPSULE | Refills: 0 | Status: SHIPPED | OUTPATIENT
Start: 2021-09-01 | End: 2021-10-13 | Stop reason: SDUPTHER

## 2021-09-01 RX ORDER — PRAVASTATIN SODIUM 40 MG/1
40 TABLET ORAL DAILY
Qty: 90 TABLET | Refills: 0 | Status: SHIPPED | OUTPATIENT
Start: 2021-09-01 | End: 2021-11-03 | Stop reason: SDUPTHER

## 2021-09-02 ENCOUNTER — PATIENT MESSAGE (OUTPATIENT)
Dept: INTERNAL MEDICINE | Facility: CLINIC | Age: 61
End: 2021-09-02

## 2021-09-13 ENCOUNTER — PATIENT MESSAGE (OUTPATIENT)
Dept: INTERNAL MEDICINE | Facility: CLINIC | Age: 61
End: 2021-09-13

## 2021-09-14 ENCOUNTER — PATIENT MESSAGE (OUTPATIENT)
Dept: INTERNAL MEDICINE | Facility: CLINIC | Age: 61
End: 2021-09-14

## 2021-09-25 ENCOUNTER — IMMUNIZATION (OUTPATIENT)
Dept: INTERNAL MEDICINE | Facility: CLINIC | Age: 61
End: 2021-09-25
Payer: COMMERCIAL

## 2021-09-25 DIAGNOSIS — Z23 NEED FOR VACCINATION: Primary | ICD-10-CM

## 2021-09-25 PROCEDURE — 0003A COVID-19, MRNA, LNP-S, PF, 30 MCG/0.3 ML DOSE VACCINE: CPT | Mod: CV19,PBBFAC | Performed by: INTERNAL MEDICINE

## 2021-09-25 PROCEDURE — 91300 COVID-19, MRNA, LNP-S, PF, 30 MCG/0.3 ML DOSE VACCINE: CPT | Mod: PBBFAC | Performed by: INTERNAL MEDICINE

## 2021-10-05 ENCOUNTER — PATIENT MESSAGE (OUTPATIENT)
Dept: ADMINISTRATIVE | Facility: HOSPITAL | Age: 61
End: 2021-10-05

## 2021-10-07 ENCOUNTER — PATIENT MESSAGE (OUTPATIENT)
Dept: INTERNAL MEDICINE | Facility: CLINIC | Age: 61
End: 2021-10-07

## 2021-10-09 ENCOUNTER — IMMUNIZATION (OUTPATIENT)
Dept: INTERNAL MEDICINE | Facility: CLINIC | Age: 61
End: 2021-10-09
Payer: COMMERCIAL

## 2021-10-09 PROCEDURE — 90686 IIV4 VACC NO PRSV 0.5 ML IM: CPT | Mod: S$GLB,,, | Performed by: FAMILY MEDICINE

## 2021-10-09 PROCEDURE — 90471 IMMUNIZATION ADMIN: CPT | Mod: S$GLB,,, | Performed by: FAMILY MEDICINE

## 2021-10-09 PROCEDURE — 90471 FLU VACCINE (QUAD) GREATER THAN OR EQUAL TO 3YO PRESERVATIVE FREE IM: ICD-10-PCS | Mod: S$GLB,,, | Performed by: FAMILY MEDICINE

## 2021-10-09 PROCEDURE — 90686 FLU VACCINE (QUAD) GREATER THAN OR EQUAL TO 3YO PRESERVATIVE FREE IM: ICD-10-PCS | Mod: S$GLB,,, | Performed by: FAMILY MEDICINE

## 2021-10-13 DIAGNOSIS — Z12.31 VISIT FOR SCREENING MAMMOGRAM: Primary | ICD-10-CM

## 2021-10-13 DIAGNOSIS — K21.9 GASTROESOPHAGEAL REFLUX DISEASE: ICD-10-CM

## 2021-10-13 RX ORDER — ESOMEPRAZOLE MAGNESIUM 40 MG/1
40 CAPSULE, DELAYED RELEASE ORAL
Qty: 30 CAPSULE | Refills: 0 | Status: SHIPPED | OUTPATIENT
Start: 2021-10-13 | End: 2021-11-03 | Stop reason: SDUPTHER

## 2021-11-02 ENCOUNTER — TELEPHONE (OUTPATIENT)
Dept: INTERNAL MEDICINE | Facility: CLINIC | Age: 61
End: 2021-11-02
Payer: COMMERCIAL

## 2021-11-03 ENCOUNTER — HOSPITAL ENCOUNTER (OUTPATIENT)
Dept: RADIOLOGY | Facility: OTHER | Age: 61
Discharge: HOME | End: 2021-11-03
Attending: FAMILY MEDICINE
Payer: COMMERCIAL

## 2021-11-03 ENCOUNTER — OFFICE VISIT (OUTPATIENT)
Dept: INTERNAL MEDICINE | Facility: CLINIC | Age: 61
End: 2021-11-03
Payer: COMMERCIAL

## 2021-11-03 VITALS
BODY MASS INDEX: 32.17 KG/M2 | SYSTOLIC BLOOD PRESSURE: 136 MMHG | WEIGHT: 193.31 LBS | OXYGEN SATURATION: 96 % | DIASTOLIC BLOOD PRESSURE: 72 MMHG | HEART RATE: 85 BPM

## 2021-11-03 DIAGNOSIS — E78.5 HYPERLIPIDEMIA, UNSPECIFIED HYPERLIPIDEMIA TYPE: ICD-10-CM

## 2021-11-03 DIAGNOSIS — Z78.0 POST-MENOPAUSAL: ICD-10-CM

## 2021-11-03 DIAGNOSIS — E55.9 VITAMIN D DEFICIENCY: ICD-10-CM

## 2021-11-03 DIAGNOSIS — D35.01 ADENOMA OF RIGHT ADRENAL GLAND: ICD-10-CM

## 2021-11-03 DIAGNOSIS — K21.9 GASTROESOPHAGEAL REFLUX DISEASE WITHOUT ESOPHAGITIS: ICD-10-CM

## 2021-11-03 DIAGNOSIS — R73.03 PREDIABETES: ICD-10-CM

## 2021-11-03 DIAGNOSIS — R59.0 ANTERIOR CERVICAL ADENOPATHY: ICD-10-CM

## 2021-11-03 DIAGNOSIS — Z80.41 FAMILY HISTORY OF OVARIAN CANCER: ICD-10-CM

## 2021-11-03 DIAGNOSIS — Z72.820 POOR SLEEP: ICD-10-CM

## 2021-11-03 DIAGNOSIS — M85.80 OSTEOPENIA, UNSPECIFIED LOCATION: ICD-10-CM

## 2021-11-03 DIAGNOSIS — Z00.00 ANNUAL PHYSICAL EXAM: Primary | ICD-10-CM

## 2021-11-03 DIAGNOSIS — Z12.11 SCREEN FOR COLON CANCER: ICD-10-CM

## 2021-11-03 PROCEDURE — 1160F RVW MEDS BY RX/DR IN RCRD: CPT | Mod: CPTII,S$GLB,, | Performed by: FAMILY MEDICINE

## 2021-11-03 PROCEDURE — 3078F DIAST BP <80 MM HG: CPT | Mod: CPTII,S$GLB,, | Performed by: FAMILY MEDICINE

## 2021-11-03 PROCEDURE — 3044F PR MOST RECENT HEMOGLOBIN A1C LEVEL <7.0%: ICD-10-PCS | Mod: CPTII,S$GLB,, | Performed by: FAMILY MEDICINE

## 2021-11-03 PROCEDURE — 99396 PREV VISIT EST AGE 40-64: CPT | Mod: S$GLB,,, | Performed by: FAMILY MEDICINE

## 2021-11-03 PROCEDURE — 3008F BODY MASS INDEX DOCD: CPT | Mod: CPTII,S$GLB,, | Performed by: FAMILY MEDICINE

## 2021-11-03 PROCEDURE — 1159F PR MEDICATION LIST DOCUMENTED IN MEDICAL RECORD: ICD-10-PCS | Mod: CPTII,S$GLB,, | Performed by: FAMILY MEDICINE

## 2021-11-03 PROCEDURE — 3078F PR MOST RECENT DIASTOLIC BLOOD PRESSURE < 80 MM HG: ICD-10-PCS | Mod: CPTII,S$GLB,, | Performed by: FAMILY MEDICINE

## 2021-11-03 PROCEDURE — 3044F HG A1C LEVEL LT 7.0%: CPT | Mod: CPTII,S$GLB,, | Performed by: FAMILY MEDICINE

## 2021-11-03 PROCEDURE — 76536 US EXAM OF HEAD AND NECK: CPT | Mod: TC

## 2021-11-03 PROCEDURE — 99999 PR PBB SHADOW E&M-EST. PATIENT-LVL IV: CPT | Mod: PBBFAC,,, | Performed by: FAMILY MEDICINE

## 2021-11-03 PROCEDURE — 99396 PR PREVENTIVE VISIT,EST,40-64: ICD-10-PCS | Mod: S$GLB,,, | Performed by: FAMILY MEDICINE

## 2021-11-03 PROCEDURE — 1160F PR REVIEW ALL MEDS BY PRESCRIBER/CLIN PHARMACIST DOCUMENTED: ICD-10-PCS | Mod: CPTII,S$GLB,, | Performed by: FAMILY MEDICINE

## 2021-11-03 PROCEDURE — 99999 PR PBB SHADOW E&M-EST. PATIENT-LVL IV: ICD-10-PCS | Mod: PBBFAC,,, | Performed by: FAMILY MEDICINE

## 2021-11-03 PROCEDURE — 76536 US EXAM OF HEAD AND NECK: CPT | Mod: 26,,, | Performed by: RADIOLOGY

## 2021-11-03 PROCEDURE — 3075F SYST BP GE 130 - 139MM HG: CPT | Mod: CPTII,S$GLB,, | Performed by: FAMILY MEDICINE

## 2021-11-03 PROCEDURE — 3075F PR MOST RECENT SYSTOLIC BLOOD PRESS GE 130-139MM HG: ICD-10-PCS | Mod: CPTII,S$GLB,, | Performed by: FAMILY MEDICINE

## 2021-11-03 PROCEDURE — 1159F MED LIST DOCD IN RCRD: CPT | Mod: CPTII,S$GLB,, | Performed by: FAMILY MEDICINE

## 2021-11-03 PROCEDURE — 76536 US SOFT TISSUE HEAD NECK THYROID: ICD-10-PCS | Mod: 26,,, | Performed by: RADIOLOGY

## 2021-11-03 PROCEDURE — 3008F PR BODY MASS INDEX (BMI) DOCUMENTED: ICD-10-PCS | Mod: CPTII,S$GLB,, | Performed by: FAMILY MEDICINE

## 2021-11-03 RX ORDER — ESOMEPRAZOLE MAGNESIUM 40 MG/1
40 CAPSULE, DELAYED RELEASE ORAL
Qty: 90 CAPSULE | Refills: 1 | Status: SHIPPED | OUTPATIENT
Start: 2021-11-03 | End: 2022-04-13 | Stop reason: SDUPTHER

## 2021-11-03 RX ORDER — METFORMIN HYDROCHLORIDE 500 MG/1
500 TABLET, EXTENDED RELEASE ORAL
Qty: 90 TABLET | Refills: 3 | Status: SHIPPED | OUTPATIENT
Start: 2021-11-03 | End: 2022-12-20 | Stop reason: ALTCHOICE

## 2021-11-03 RX ORDER — HYDROXYZINE HYDROCHLORIDE 10 MG/1
10 TABLET, FILM COATED ORAL NIGHTLY PRN
Qty: 90 TABLET | Refills: 1 | Status: SHIPPED | OUTPATIENT
Start: 2021-11-03 | End: 2022-05-24 | Stop reason: SDUPTHER

## 2021-11-03 RX ORDER — PRAVASTATIN SODIUM 40 MG/1
40 TABLET ORAL DAILY
Qty: 90 TABLET | Refills: 3 | Status: SHIPPED | OUTPATIENT
Start: 2021-11-03 | End: 2022-10-25 | Stop reason: SDUPTHER

## 2021-11-04 ENCOUNTER — PATIENT MESSAGE (OUTPATIENT)
Dept: FAMILY MEDICINE | Facility: CLINIC | Age: 61
End: 2021-11-04
Payer: COMMERCIAL

## 2021-11-04 ENCOUNTER — PATIENT MESSAGE (OUTPATIENT)
Dept: ENDOCRINOLOGY | Facility: CLINIC | Age: 61
End: 2021-11-04

## 2021-11-04 ENCOUNTER — TELEPHONE (OUTPATIENT)
Dept: INTERNAL MEDICINE | Facility: CLINIC | Age: 61
End: 2021-11-04
Payer: COMMERCIAL

## 2021-11-04 ENCOUNTER — LAB VISIT (OUTPATIENT)
Dept: LAB | Facility: HOSPITAL | Age: 61
End: 2021-11-04
Attending: FAMILY MEDICINE
Payer: COMMERCIAL

## 2021-11-04 ENCOUNTER — OFFICE VISIT (OUTPATIENT)
Dept: ENDOCRINOLOGY | Facility: CLINIC | Age: 61
End: 2021-11-04
Payer: COMMERCIAL

## 2021-11-04 VITALS
HEIGHT: 65 IN | BODY MASS INDEX: 32.47 KG/M2 | SYSTOLIC BLOOD PRESSURE: 132 MMHG | WEIGHT: 194.88 LBS | DIASTOLIC BLOOD PRESSURE: 80 MMHG

## 2021-11-04 DIAGNOSIS — R73.03 PREDIABETES: ICD-10-CM

## 2021-11-04 DIAGNOSIS — E04.1 THYROID NODULE: ICD-10-CM

## 2021-11-04 DIAGNOSIS — R93.89 ABNORMAL THYROID ULTRASOUND: ICD-10-CM

## 2021-11-04 DIAGNOSIS — E55.9 VITAMIN D DEFICIENCY: ICD-10-CM

## 2021-11-04 DIAGNOSIS — E04.2 MULTINODULAR GOITER (NONTOXIC): ICD-10-CM

## 2021-11-04 DIAGNOSIS — D35.01 ADENOMA OF RIGHT ADRENAL GLAND: Primary | ICD-10-CM

## 2021-11-04 DIAGNOSIS — E04.1 THYROID NODULE: Primary | ICD-10-CM

## 2021-11-04 DIAGNOSIS — E04.2 MULTINODULAR GOITER (NONTOXIC): Primary | ICD-10-CM

## 2021-11-04 LAB
25(OH)D3+25(OH)D2 SERPL-MCNC: 20 NG/ML (ref 30–96)
25(OH)D3+25(OH)D2 SERPL-MCNC: 20 NG/ML (ref 30–96)
ESTIMATED AVG GLUCOSE: 137 MG/DL (ref 68–131)
ESTIMATED AVG GLUCOSE: 137 MG/DL (ref 68–131)
HBA1C MFR BLD: 6.4 % (ref 4–5.6)
HBA1C MFR BLD: 6.4 % (ref 4–5.6)
T3 SERPL-MCNC: 115 NG/DL (ref 60–180)
T4 FREE SERPL-MCNC: 0.83 NG/DL (ref 0.71–1.51)
THYROPEROXIDASE IGG SERPL-ACNC: <6 IU/ML
TSH SERPL DL<=0.005 MIU/L-ACNC: 0.94 UIU/ML (ref 0.4–4)

## 2021-11-04 PROCEDURE — 3079F PR MOST RECENT DIASTOLIC BLOOD PRESSURE 80-89 MM HG: ICD-10-PCS | Mod: CPTII,S$GLB,, | Performed by: INTERNAL MEDICINE

## 2021-11-04 PROCEDURE — 3044F HG A1C LEVEL LT 7.0%: CPT | Mod: CPTII,S$GLB,, | Performed by: INTERNAL MEDICINE

## 2021-11-04 PROCEDURE — 84443 ASSAY THYROID STIM HORMONE: CPT | Performed by: FAMILY MEDICINE

## 2021-11-04 PROCEDURE — 3079F DIAST BP 80-89 MM HG: CPT | Mod: CPTII,S$GLB,, | Performed by: INTERNAL MEDICINE

## 2021-11-04 PROCEDURE — 1159F PR MEDICATION LIST DOCUMENTED IN MEDICAL RECORD: ICD-10-PCS | Mod: CPTII,S$GLB,, | Performed by: INTERNAL MEDICINE

## 2021-11-04 PROCEDURE — 84439 ASSAY OF FREE THYROXINE: CPT | Performed by: FAMILY MEDICINE

## 2021-11-04 PROCEDURE — 1160F RVW MEDS BY RX/DR IN RCRD: CPT | Mod: CPTII,S$GLB,, | Performed by: INTERNAL MEDICINE

## 2021-11-04 PROCEDURE — 83520 IMMUNOASSAY QUANT NOS NONAB: CPT | Performed by: FAMILY MEDICINE

## 2021-11-04 PROCEDURE — 3075F SYST BP GE 130 - 139MM HG: CPT | Mod: CPTII,S$GLB,, | Performed by: INTERNAL MEDICINE

## 2021-11-04 PROCEDURE — 99999 PR PBB SHADOW E&M-EST. PATIENT-LVL III: ICD-10-PCS | Mod: PBBFAC,,, | Performed by: INTERNAL MEDICINE

## 2021-11-04 PROCEDURE — 99244 OFF/OP CNSLTJ NEW/EST MOD 40: CPT | Mod: S$GLB,,, | Performed by: INTERNAL MEDICINE

## 2021-11-04 PROCEDURE — 3044F PR MOST RECENT HEMOGLOBIN A1C LEVEL <7.0%: ICD-10-PCS | Mod: CPTII,S$GLB,, | Performed by: INTERNAL MEDICINE

## 2021-11-04 PROCEDURE — 84480 ASSAY TRIIODOTHYRONINE (T3): CPT | Performed by: FAMILY MEDICINE

## 2021-11-04 PROCEDURE — 82306 VITAMIN D 25 HYDROXY: CPT | Performed by: FAMILY MEDICINE

## 2021-11-04 PROCEDURE — 3008F PR BODY MASS INDEX (BMI) DOCUMENTED: ICD-10-PCS | Mod: CPTII,S$GLB,, | Performed by: INTERNAL MEDICINE

## 2021-11-04 PROCEDURE — 86376 MICROSOMAL ANTIBODY EACH: CPT | Performed by: FAMILY MEDICINE

## 2021-11-04 PROCEDURE — 1159F MED LIST DOCD IN RCRD: CPT | Mod: CPTII,S$GLB,, | Performed by: INTERNAL MEDICINE

## 2021-11-04 PROCEDURE — 3008F BODY MASS INDEX DOCD: CPT | Mod: CPTII,S$GLB,, | Performed by: INTERNAL MEDICINE

## 2021-11-04 PROCEDURE — 3075F PR MOST RECENT SYSTOLIC BLOOD PRESS GE 130-139MM HG: ICD-10-PCS | Mod: CPTII,S$GLB,, | Performed by: INTERNAL MEDICINE

## 2021-11-04 PROCEDURE — 1160F PR REVIEW ALL MEDS BY PRESCRIBER/CLIN PHARMACIST DOCUMENTED: ICD-10-PCS | Mod: CPTII,S$GLB,, | Performed by: INTERNAL MEDICINE

## 2021-11-04 PROCEDURE — 83036 HEMOGLOBIN GLYCOSYLATED A1C: CPT | Performed by: FAMILY MEDICINE

## 2021-11-04 PROCEDURE — 99244 PR OFFICE CONSULTATION,LEVEL IV: ICD-10-PCS | Mod: S$GLB,,, | Performed by: INTERNAL MEDICINE

## 2021-11-04 PROCEDURE — 99999 PR PBB SHADOW E&M-EST. PATIENT-LVL III: CPT | Mod: PBBFAC,,, | Performed by: INTERNAL MEDICINE

## 2021-11-04 RX ORDER — DEXAMETHASONE 1 MG/1
TABLET ORAL
Qty: 1 TABLET | Refills: 1 | Status: CANCELLED | OUTPATIENT
Start: 2021-11-04

## 2021-11-05 ENCOUNTER — PATIENT MESSAGE (OUTPATIENT)
Dept: ENDOCRINOLOGY | Facility: CLINIC | Age: 61
End: 2021-11-05
Payer: COMMERCIAL

## 2021-11-05 PROBLEM — E04.2 MULTINODULAR GOITER (NONTOXIC): Status: ACTIVE | Noted: 2021-11-05

## 2021-11-05 LAB — TSH RECEP AB SER-ACNC: <1.1 IU/L (ref 0–1.75)

## 2021-11-08 ENCOUNTER — PATIENT MESSAGE (OUTPATIENT)
Dept: INTERNAL MEDICINE | Facility: CLINIC | Age: 61
End: 2021-11-08
Payer: COMMERCIAL

## 2021-11-10 ENCOUNTER — OFFICE VISIT (OUTPATIENT)
Dept: OTOLARYNGOLOGY | Facility: CLINIC | Age: 61
End: 2021-11-10
Payer: COMMERCIAL

## 2021-11-10 VITALS — WEIGHT: 193.81 LBS | BODY MASS INDEX: 32.25 KG/M2

## 2021-11-10 DIAGNOSIS — E04.2 MULTINODULAR GOITER (NONTOXIC): Primary | ICD-10-CM

## 2021-11-10 PROCEDURE — 1160F PR REVIEW ALL MEDS BY PRESCRIBER/CLIN PHARMACIST DOCUMENTED: ICD-10-PCS | Mod: CPTII,S$GLB,, | Performed by: OTOLARYNGOLOGY

## 2021-11-10 PROCEDURE — 3044F PR MOST RECENT HEMOGLOBIN A1C LEVEL <7.0%: ICD-10-PCS | Mod: CPTII,S$GLB,, | Performed by: OTOLARYNGOLOGY

## 2021-11-10 PROCEDURE — 3008F PR BODY MASS INDEX (BMI) DOCUMENTED: ICD-10-PCS | Mod: CPTII,S$GLB,, | Performed by: OTOLARYNGOLOGY

## 2021-11-10 PROCEDURE — 3044F HG A1C LEVEL LT 7.0%: CPT | Mod: CPTII,S$GLB,, | Performed by: OTOLARYNGOLOGY

## 2021-11-10 PROCEDURE — 99999 PR PBB SHADOW E&M-EST. PATIENT-LVL III: ICD-10-PCS | Mod: PBBFAC,,, | Performed by: OTOLARYNGOLOGY

## 2021-11-10 PROCEDURE — 99204 PR OFFICE/OUTPT VISIT, NEW, LEVL IV, 45-59 MIN: ICD-10-PCS | Mod: S$GLB,,, | Performed by: OTOLARYNGOLOGY

## 2021-11-10 PROCEDURE — 99999 PR PBB SHADOW E&M-EST. PATIENT-LVL III: CPT | Mod: PBBFAC,,, | Performed by: OTOLARYNGOLOGY

## 2021-11-10 PROCEDURE — 1159F MED LIST DOCD IN RCRD: CPT | Mod: CPTII,S$GLB,, | Performed by: OTOLARYNGOLOGY

## 2021-11-10 PROCEDURE — 3008F BODY MASS INDEX DOCD: CPT | Mod: CPTII,S$GLB,, | Performed by: OTOLARYNGOLOGY

## 2021-11-10 PROCEDURE — 1159F PR MEDICATION LIST DOCUMENTED IN MEDICAL RECORD: ICD-10-PCS | Mod: CPTII,S$GLB,, | Performed by: OTOLARYNGOLOGY

## 2021-11-10 PROCEDURE — 1160F RVW MEDS BY RX/DR IN RCRD: CPT | Mod: CPTII,S$GLB,, | Performed by: OTOLARYNGOLOGY

## 2021-11-10 PROCEDURE — 99204 OFFICE O/P NEW MOD 45 MIN: CPT | Mod: S$GLB,,, | Performed by: OTOLARYNGOLOGY

## 2021-11-10 RX ORDER — LIDOCAINE HYDROCHLORIDE 10 MG/ML
1 INJECTION, SOLUTION EPIDURAL; INFILTRATION; INTRACAUDAL; PERINEURAL ONCE
Status: CANCELLED | OUTPATIENT
Start: 2021-11-10 | End: 2021-11-10

## 2021-11-10 RX ORDER — SODIUM CHLORIDE 0.9 % (FLUSH) 0.9 %
10 SYRINGE (ML) INJECTION
Status: CANCELLED | OUTPATIENT
Start: 2021-11-10

## 2021-11-15 ENCOUNTER — HOSPITAL ENCOUNTER (OUTPATIENT)
Dept: ENDOCRINOLOGY | Facility: CLINIC | Age: 61
Discharge: HOME | End: 2021-11-15
Attending: INTERNAL MEDICINE
Payer: COMMERCIAL

## 2021-11-15 DIAGNOSIS — E04.1 THYROID NODULE: ICD-10-CM

## 2021-11-15 PROCEDURE — 88173 PR  INTERPRETATION OF FNA SMEAR: ICD-10-PCS | Mod: 26,,, | Performed by: PATHOLOGY

## 2021-11-15 PROCEDURE — 88173 CYTOPATH EVAL FNA REPORT: CPT | Mod: 26,59,, | Performed by: PATHOLOGY

## 2021-11-15 PROCEDURE — 88173 CYTOPATH EVAL FNA REPORT: CPT | Performed by: PATHOLOGY

## 2021-11-15 PROCEDURE — 88173 CYTOPATH EVAL FNA REPORT: CPT | Mod: 59 | Performed by: PATHOLOGY

## 2021-11-15 PROCEDURE — 10006 US FINE NEEDLE ASPIRATION THYROID EA ADDITIONAL LESION: ICD-10-PCS | Mod: S$GLB,,, | Performed by: INTERNAL MEDICINE

## 2021-11-15 PROCEDURE — 10005 US FINE NEEDLE ASPIRATION THYROID, FIRST LESION: ICD-10-PCS | Mod: S$GLB,,, | Performed by: INTERNAL MEDICINE

## 2021-11-15 PROCEDURE — 88173 CYTOPATH EVAL FNA REPORT: CPT | Mod: 26,,, | Performed by: PATHOLOGY

## 2021-11-15 PROCEDURE — 10006 FNA BX W/US GDN EA ADDL: CPT | Mod: S$GLB,,, | Performed by: INTERNAL MEDICINE

## 2021-11-15 PROCEDURE — 88173 PR  INTERPRETATION OF FNA SMEAR: ICD-10-PCS | Mod: 26,59,, | Performed by: PATHOLOGY

## 2021-11-15 PROCEDURE — 10005 FNA BX W/US GDN 1ST LES: CPT | Mod: S$GLB,,, | Performed by: INTERNAL MEDICINE

## 2021-11-17 ENCOUNTER — PATIENT MESSAGE (OUTPATIENT)
Dept: OTOLARYNGOLOGY | Facility: CLINIC | Age: 61
End: 2021-11-17
Payer: COMMERCIAL

## 2021-11-17 LAB
COMMENT: NORMAL
FINAL PATHOLOGIC DIAGNOSIS: NORMAL
FINAL PATHOLOGIC DIAGNOSIS: NORMAL
Lab: NORMAL
Lab: NORMAL
MICROSCOPIC EXAM: NORMAL

## 2021-11-18 ENCOUNTER — PATIENT MESSAGE (OUTPATIENT)
Dept: ENDOCRINOLOGY | Facility: CLINIC | Age: 61
End: 2021-11-18
Payer: COMMERCIAL

## 2021-11-23 ENCOUNTER — TELEPHONE (OUTPATIENT)
Dept: OTOLARYNGOLOGY | Facility: CLINIC | Age: 61
End: 2021-11-23
Payer: COMMERCIAL

## 2021-11-23 ENCOUNTER — ANESTHESIA EVENT (OUTPATIENT)
Dept: SURGERY | Facility: HOSPITAL | Age: 61
End: 2021-11-23
Payer: COMMERCIAL

## 2021-11-24 ENCOUNTER — HOSPITAL ENCOUNTER (OUTPATIENT)
Facility: HOSPITAL | Age: 61
Discharge: HOME | End: 2021-11-25
Attending: OTOLARYNGOLOGY | Admitting: OTOLARYNGOLOGY
Payer: COMMERCIAL

## 2021-11-24 ENCOUNTER — ANESTHESIA (OUTPATIENT)
Dept: SURGERY | Facility: HOSPITAL | Age: 61
End: 2021-11-24
Payer: COMMERCIAL

## 2021-11-24 DIAGNOSIS — E04.2 MULTINODULAR GOITER (NONTOXIC): ICD-10-CM

## 2021-11-24 PROCEDURE — 25000003 PHARM REV CODE 250: Performed by: OTOLARYNGOLOGY

## 2021-11-24 PROCEDURE — 94761 N-INVAS EAR/PLS OXIMETRY MLT: CPT

## 2021-11-24 PROCEDURE — 27201423 OPTIME MED/SURG SUP & DEVICES STERILE SUPPLY: Performed by: OTOLARYNGOLOGY

## 2021-11-24 PROCEDURE — 37000008 HC ANESTHESIA 1ST 15 MINUTES: Performed by: OTOLARYNGOLOGY

## 2021-11-24 PROCEDURE — 60220 PARTIAL REMOVAL OF THYROID: CPT | Mod: LT,,, | Performed by: OTOLARYNGOLOGY

## 2021-11-24 PROCEDURE — 71000015 HC POSTOP RECOV 1ST HR: Performed by: OTOLARYNGOLOGY

## 2021-11-24 PROCEDURE — 25000003 PHARM REV CODE 250: Performed by: STUDENT IN AN ORGANIZED HEALTH CARE EDUCATION/TRAINING PROGRAM

## 2021-11-24 PROCEDURE — 60220 PR THYROID LOBECTOMY,UNILAT: ICD-10-PCS | Mod: LT,,, | Performed by: OTOLARYNGOLOGY

## 2021-11-24 PROCEDURE — D9220A PRA ANESTHESIA: Mod: ,,, | Performed by: STUDENT IN AN ORGANIZED HEALTH CARE EDUCATION/TRAINING PROGRAM

## 2021-11-24 PROCEDURE — 36000707: Performed by: OTOLARYNGOLOGY

## 2021-11-24 PROCEDURE — 63600175 PHARM REV CODE 636 W HCPCS: Performed by: OTOLARYNGOLOGY

## 2021-11-24 PROCEDURE — 71000033 HC RECOVERY, INTIAL HOUR: Performed by: OTOLARYNGOLOGY

## 2021-11-24 PROCEDURE — 63600175 PHARM REV CODE 636 W HCPCS: Performed by: STUDENT IN AN ORGANIZED HEALTH CARE EDUCATION/TRAINING PROGRAM

## 2021-11-24 PROCEDURE — 37000009 HC ANESTHESIA EA ADD 15 MINS: Performed by: OTOLARYNGOLOGY

## 2021-11-24 PROCEDURE — C1729 CATH, DRAINAGE: HCPCS | Performed by: OTOLARYNGOLOGY

## 2021-11-24 PROCEDURE — 71000016 HC POSTOP RECOV ADDL HR: Performed by: OTOLARYNGOLOGY

## 2021-11-24 PROCEDURE — D9220A PRA ANESTHESIA: ICD-10-PCS | Mod: ,,, | Performed by: STUDENT IN AN ORGANIZED HEALTH CARE EDUCATION/TRAINING PROGRAM

## 2021-11-24 PROCEDURE — 36000706: Performed by: OTOLARYNGOLOGY

## 2021-11-24 RX ORDER — ACETAMINOPHEN 325 MG/1
650 TABLET ORAL EVERY 8 HOURS PRN
Status: DISCONTINUED | OUTPATIENT
Start: 2021-11-24 | End: 2021-11-25 | Stop reason: HOSPADM

## 2021-11-24 RX ORDER — SODIUM CHLORIDE 0.9 % (FLUSH) 0.9 %
10 SYRINGE (ML) INJECTION
Status: DISCONTINUED | OUTPATIENT
Start: 2021-11-24 | End: 2021-11-25 | Stop reason: HOSPADM

## 2021-11-24 RX ORDER — ACETAMINOPHEN 10 MG/ML
INJECTION, SOLUTION INTRAVENOUS
Status: DISCONTINUED | OUTPATIENT
Start: 2021-11-24 | End: 2021-11-24

## 2021-11-24 RX ORDER — LIDOCAINE HYDROCHLORIDE 20 MG/ML
INJECTION, SOLUTION EPIDURAL; INFILTRATION; INTRACAUDAL; PERINEURAL
Status: DISCONTINUED | OUTPATIENT
Start: 2021-11-24 | End: 2021-11-24

## 2021-11-24 RX ORDER — CEFAZOLIN SODIUM 1 G/3ML
2 INJECTION, POWDER, FOR SOLUTION INTRAMUSCULAR; INTRAVENOUS
Status: COMPLETED | OUTPATIENT
Start: 2021-11-24 | End: 2021-11-24

## 2021-11-24 RX ORDER — IBUPROFEN 200 MG
16 TABLET ORAL
Status: DISCONTINUED | OUTPATIENT
Start: 2021-11-24 | End: 2021-11-25 | Stop reason: HOSPADM

## 2021-11-24 RX ORDER — SUCCINYLCHOLINE CHLORIDE 20 MG/ML
INJECTION INTRAMUSCULAR; INTRAVENOUS
Status: DISCONTINUED | OUTPATIENT
Start: 2021-11-24 | End: 2021-11-24

## 2021-11-24 RX ORDER — FENTANYL CITRATE 50 UG/ML
25 INJECTION, SOLUTION INTRAMUSCULAR; INTRAVENOUS EVERY 5 MIN PRN
Status: COMPLETED | OUTPATIENT
Start: 2021-11-24 | End: 2021-11-24

## 2021-11-24 RX ORDER — DEXAMETHASONE SODIUM PHOSPHATE 4 MG/ML
INJECTION, SOLUTION INTRA-ARTICULAR; INTRALESIONAL; INTRAMUSCULAR; INTRAVENOUS; SOFT TISSUE
Status: DISCONTINUED | OUTPATIENT
Start: 2021-11-24 | End: 2021-11-24

## 2021-11-24 RX ORDER — PROPOFOL 10 MG/ML
VIAL (ML) INTRAVENOUS
Status: DISCONTINUED | OUTPATIENT
Start: 2021-11-24 | End: 2021-11-24

## 2021-11-24 RX ORDER — ONDANSETRON 8 MG/1
8 TABLET, ORALLY DISINTEGRATING ORAL EVERY 8 HOURS PRN
Status: DISCONTINUED | OUTPATIENT
Start: 2021-11-24 | End: 2021-11-25 | Stop reason: HOSPADM

## 2021-11-24 RX ORDER — LIDOCAINE HYDROCHLORIDE 10 MG/ML
1 INJECTION, SOLUTION EPIDURAL; INFILTRATION; INTRACAUDAL; PERINEURAL ONCE
Status: DISCONTINUED | OUTPATIENT
Start: 2021-11-24 | End: 2021-11-25 | Stop reason: HOSPADM

## 2021-11-24 RX ORDER — ACETAMINOPHEN 325 MG/1
650 TABLET ORAL EVERY 4 HOURS PRN
Status: DISCONTINUED | OUTPATIENT
Start: 2021-11-24 | End: 2021-11-25 | Stop reason: HOSPADM

## 2021-11-24 RX ORDER — GLUCAGON 1 MG
1 KIT INJECTION
Status: DISCONTINUED | OUTPATIENT
Start: 2021-11-24 | End: 2021-11-25 | Stop reason: HOSPADM

## 2021-11-24 RX ORDER — SODIUM CHLORIDE 0.9 % (FLUSH) 0.9 %
10 SYRINGE (ML) INJECTION
Status: DISCONTINUED | OUTPATIENT
Start: 2021-11-24 | End: 2021-11-24

## 2021-11-24 RX ORDER — TALC
6 POWDER (GRAM) TOPICAL NIGHTLY PRN
Status: DISCONTINUED | OUTPATIENT
Start: 2021-11-24 | End: 2021-11-25 | Stop reason: HOSPADM

## 2021-11-24 RX ORDER — IBUPROFEN 200 MG
24 TABLET ORAL
Status: DISCONTINUED | OUTPATIENT
Start: 2021-11-24 | End: 2021-11-25 | Stop reason: HOSPADM

## 2021-11-24 RX ORDER — FERROUS GLUCONATE 324(38)MG
324 TABLET ORAL
Status: DISCONTINUED | OUTPATIENT
Start: 2021-11-24 | End: 2021-11-24

## 2021-11-24 RX ORDER — PHENYLEPHRINE HCL IN 0.9% NACL 1 MG/10 ML
SYRINGE (ML) INTRAVENOUS
Status: DISCONTINUED | OUTPATIENT
Start: 2021-11-24 | End: 2021-11-24

## 2021-11-24 RX ORDER — LIDOCAINE HYDROCHLORIDE 10 MG/ML
1 INJECTION, SOLUTION EPIDURAL; INFILTRATION; INTRACAUDAL; PERINEURAL ONCE
Status: COMPLETED | OUTPATIENT
Start: 2021-11-24 | End: 2021-11-24

## 2021-11-24 RX ORDER — INSULIN ASPART 100 [IU]/ML
1-10 INJECTION, SOLUTION INTRAVENOUS; SUBCUTANEOUS
Status: DISCONTINUED | OUTPATIENT
Start: 2021-11-24 | End: 2021-11-25 | Stop reason: HOSPADM

## 2021-11-24 RX ORDER — HYDROXYZINE HYDROCHLORIDE 10 MG/1
10 TABLET, FILM COATED ORAL NIGHTLY PRN
Status: DISCONTINUED | OUTPATIENT
Start: 2021-11-24 | End: 2021-11-25 | Stop reason: HOSPADM

## 2021-11-24 RX ORDER — FENTANYL CITRATE 50 UG/ML
INJECTION, SOLUTION INTRAMUSCULAR; INTRAVENOUS
Status: DISCONTINUED | OUTPATIENT
Start: 2021-11-24 | End: 2021-11-24

## 2021-11-24 RX ORDER — HYDROCODONE BITARTRATE AND ACETAMINOPHEN 5; 325 MG/1; MG/1
1 TABLET ORAL EVERY 4 HOURS PRN
Status: DISCONTINUED | OUTPATIENT
Start: 2021-11-24 | End: 2021-11-25 | Stop reason: HOSPADM

## 2021-11-24 RX ORDER — ONDANSETRON 2 MG/ML
INJECTION INTRAMUSCULAR; INTRAVENOUS
Status: DISCONTINUED | OUTPATIENT
Start: 2021-11-24 | End: 2021-11-24

## 2021-11-24 RX ORDER — ONDANSETRON 2 MG/ML
4 INJECTION INTRAMUSCULAR; INTRAVENOUS DAILY PRN
Status: DISCONTINUED | OUTPATIENT
Start: 2021-11-24 | End: 2021-11-24 | Stop reason: HOSPADM

## 2021-11-24 RX ORDER — LIDOCAINE HYDROCHLORIDE AND EPINEPHRINE 10; 10 MG/ML; UG/ML
INJECTION, SOLUTION INFILTRATION; PERINEURAL
Status: DISCONTINUED | OUTPATIENT
Start: 2021-11-24 | End: 2021-11-24 | Stop reason: HOSPADM

## 2021-11-24 RX ORDER — FERROUS GLUCONATE 324(37.5)
324 TABLET ORAL
Status: DISCONTINUED | OUTPATIENT
Start: 2021-11-24 | End: 2021-11-25 | Stop reason: HOSPADM

## 2021-11-24 RX ORDER — HYDROMORPHONE HYDROCHLORIDE 1 MG/ML
0.2 INJECTION, SOLUTION INTRAMUSCULAR; INTRAVENOUS; SUBCUTANEOUS EVERY 5 MIN PRN
Status: DISCONTINUED | OUTPATIENT
Start: 2021-11-24 | End: 2021-11-24 | Stop reason: HOSPADM

## 2021-11-24 RX ORDER — KETAMINE HCL IN 0.9 % NACL 50 MG/5 ML
SYRINGE (ML) INTRAVENOUS
Status: DISCONTINUED | OUTPATIENT
Start: 2021-11-24 | End: 2021-11-24

## 2021-11-24 RX ORDER — MIDAZOLAM HYDROCHLORIDE 1 MG/ML
INJECTION, SOLUTION INTRAMUSCULAR; INTRAVENOUS
Status: DISCONTINUED | OUTPATIENT
Start: 2021-11-24 | End: 2021-11-24

## 2021-11-24 RX ADMIN — Medication 100 MCG: at 10:11

## 2021-11-24 RX ADMIN — ONDANSETRON 4 MG: 2 INJECTION INTRAMUSCULAR; INTRAVENOUS at 12:11

## 2021-11-24 RX ADMIN — HYDROCODONE BITARTRATE AND ACETAMINOPHEN 1 TABLET: 5; 325 TABLET ORAL at 09:11

## 2021-11-24 RX ADMIN — DEXAMETHASONE SODIUM PHOSPHATE 8 MG: 4 INJECTION INTRA-ARTICULAR; INTRALESIONAL; INTRAMUSCULAR; INTRAVENOUS; SOFT TISSUE at 10:11

## 2021-11-24 RX ADMIN — Medication 100 MCG: at 11:11

## 2021-11-24 RX ADMIN — PROPOFOL 50 MG: 10 INJECTION, EMULSION INTRAVENOUS at 11:11

## 2021-11-24 RX ADMIN — ONDANSETRON 8 MG: 8 TABLET, ORALLY DISINTEGRATING ORAL at 04:11

## 2021-11-24 RX ADMIN — LIDOCAINE HYDROCHLORIDE 100 MG: 20 INJECTION, SOLUTION EPIDURAL; INFILTRATION; INTRACAUDAL at 10:11

## 2021-11-24 RX ADMIN — Medication 324 MG: at 05:11

## 2021-11-24 RX ADMIN — HYDROMORPHONE HYDROCHLORIDE 0.2 MG: 1 INJECTION, SOLUTION INTRAMUSCULAR; INTRAVENOUS; SUBCUTANEOUS at 02:11

## 2021-11-24 RX ADMIN — HYDROCODONE BITARTRATE AND ACETAMINOPHEN 1 TABLET: 5; 325 TABLET ORAL at 12:11

## 2021-11-24 RX ADMIN — HYDROXYZINE HYDROCHLORIDE 10 MG: 10 TABLET, FILM COATED ORAL at 11:11

## 2021-11-24 RX ADMIN — SODIUM CHLORIDE: 0.9 INJECTION, SOLUTION INTRAVENOUS at 10:11

## 2021-11-24 RX ADMIN — SUCCINYLCHOLINE CHLORIDE 140 MG: 20 INJECTION, SOLUTION INTRAMUSCULAR; INTRAVENOUS; PARENTERAL at 10:11

## 2021-11-24 RX ADMIN — FENTANYL CITRATE 25 MCG: 50 INJECTION INTRAMUSCULAR; INTRAVENOUS at 01:11

## 2021-11-24 RX ADMIN — FENTANYL CITRATE 150 MCG: 50 INJECTION INTRAMUSCULAR; INTRAVENOUS at 10:11

## 2021-11-24 RX ADMIN — ACETAMINOPHEN 1000 MG: 10 INJECTION INTRAVENOUS at 10:11

## 2021-11-24 RX ADMIN — HYDROCODONE BITARTRATE AND ACETAMINOPHEN 1 TABLET: 5; 325 TABLET ORAL at 04:11

## 2021-11-24 RX ADMIN — MIDAZOLAM 2 MG: 1 INJECTION INTRAMUSCULAR; INTRAVENOUS at 10:11

## 2021-11-24 RX ADMIN — LIDOCAINE HYDROCHLORIDE 0.4 MG: 10 INJECTION, SOLUTION EPIDURAL; INFILTRATION; INTRACAUDAL at 08:11

## 2021-11-24 RX ADMIN — PROPOFOL 200 MG: 10 INJECTION, EMULSION INTRAVENOUS at 10:11

## 2021-11-24 RX ADMIN — Medication 100 MCG: at 12:11

## 2021-11-24 RX ADMIN — LIDOCAINE HYDROCHLORIDE 50 MG: 20 INJECTION, SOLUTION EPIDURAL; INFILTRATION; INTRACAUDAL at 12:11

## 2021-11-24 RX ADMIN — HYDROMORPHONE HYDROCHLORIDE 0.2 MG: 1 INJECTION, SOLUTION INTRAMUSCULAR; INTRAVENOUS; SUBCUTANEOUS at 01:11

## 2021-11-24 RX ADMIN — CEFAZOLIN 2 G: 330 INJECTION, POWDER, FOR SOLUTION INTRAMUSCULAR; INTRAVENOUS at 10:11

## 2021-11-24 RX ADMIN — FENTANYL CITRATE 25 MCG: 50 INJECTION INTRAMUSCULAR; INTRAVENOUS at 12:11

## 2021-11-24 RX ADMIN — Medication 20 MG: at 11:11

## 2021-11-24 RX ADMIN — Medication 6 MG: at 09:11

## 2021-11-24 RX ADMIN — PROPOFOL 40 MG: 10 INJECTION, EMULSION INTRAVENOUS at 10:11

## 2021-11-24 RX ADMIN — FENTANYL CITRATE 50 MCG: 50 INJECTION INTRAMUSCULAR; INTRAVENOUS at 11:11

## 2021-11-24 RX ADMIN — Medication 10 MG: at 11:11

## 2021-11-25 VITALS
HEART RATE: 68 BPM | RESPIRATION RATE: 17 BRPM | HEIGHT: 65 IN | OXYGEN SATURATION: 96 % | BODY MASS INDEX: 31.65 KG/M2 | DIASTOLIC BLOOD PRESSURE: 74 MMHG | SYSTOLIC BLOOD PRESSURE: 140 MMHG | TEMPERATURE: 97 F | WEIGHT: 190 LBS

## 2021-11-25 PROCEDURE — 25000003 PHARM REV CODE 250: Performed by: OTOLARYNGOLOGY

## 2021-11-25 PROCEDURE — 94760 N-INVAS EAR/PLS OXIMETRY 1: CPT

## 2021-11-25 PROCEDURE — 25000003 PHARM REV CODE 250: Performed by: STUDENT IN AN ORGANIZED HEALTH CARE EDUCATION/TRAINING PROGRAM

## 2021-11-25 RX ORDER — ONDANSETRON 4 MG/1
4 TABLET, ORALLY DISINTEGRATING ORAL EVERY 8 HOURS PRN
Qty: 20 TABLET | Refills: 0 | Status: SHIPPED | OUTPATIENT
Start: 2021-11-25 | End: 2021-11-25 | Stop reason: SDUPTHER

## 2021-11-25 RX ORDER — HYDROCODONE BITARTRATE AND ACETAMINOPHEN 7.5; 325 MG/1; MG/1
1 TABLET ORAL EVERY 6 HOURS PRN
Qty: 15 TABLET | Refills: 0 | Status: SHIPPED | OUTPATIENT
Start: 2021-11-25 | End: 2021-11-30

## 2021-11-25 RX ORDER — ONDANSETRON 4 MG/1
4 TABLET, ORALLY DISINTEGRATING ORAL EVERY 8 HOURS PRN
Qty: 15 TABLET | Refills: 0 | Status: SHIPPED | OUTPATIENT
Start: 2021-11-25 | End: 2021-11-25

## 2021-11-25 RX ORDER — ONDANSETRON 4 MG/1
4 TABLET, ORALLY DISINTEGRATING ORAL EVERY 8 HOURS PRN
Qty: 15 TABLET | Refills: 0 | Status: SHIPPED | OUTPATIENT
Start: 2021-11-25 | End: 2022-12-20 | Stop reason: ALTCHOICE

## 2021-11-25 RX ORDER — HYDROCODONE BITARTRATE AND ACETAMINOPHEN 7.5; 325 MG/1; MG/1
1 TABLET ORAL EVERY 6 HOURS PRN
Qty: 15 TABLET | Refills: 0 | Status: SHIPPED | OUTPATIENT
Start: 2021-11-25 | End: 2021-11-25 | Stop reason: SDUPTHER

## 2021-11-25 RX ORDER — CEPHALEXIN 500 MG/1
500 CAPSULE ORAL EVERY 12 HOURS
Qty: 14 CAPSULE | Refills: 0 | Status: SHIPPED | OUTPATIENT
Start: 2021-11-25 | End: 2021-12-02

## 2021-11-25 RX ORDER — HYDROCODONE BITARTRATE AND ACETAMINOPHEN 7.5; 325 MG/1; MG/1
1 TABLET ORAL EVERY 6 HOURS PRN
Qty: 15 TABLET | Refills: 0 | OUTPATIENT
Start: 2021-11-25 | End: 2021-11-25 | Stop reason: SDUPTHER

## 2021-11-25 RX ORDER — HYDROCODONE BITARTRATE AND ACETAMINOPHEN 7.5; 325 MG/1; MG/1
1 TABLET ORAL EVERY 8 HOURS PRN
Qty: 15 TABLET | Refills: 0 | Status: SHIPPED | OUTPATIENT
Start: 2021-11-25 | End: 2021-11-25 | Stop reason: SDUPTHER

## 2021-11-25 RX ORDER — CEPHALEXIN 500 MG/1
500 CAPSULE ORAL EVERY 8 HOURS
Qty: 21 CAPSULE | Refills: 0 | Status: SHIPPED | OUTPATIENT
Start: 2021-11-25 | End: 2021-11-25 | Stop reason: SDUPTHER

## 2021-11-25 RX ADMIN — HYDROCODONE BITARTRATE AND ACETAMINOPHEN 1 TABLET: 5; 325 TABLET ORAL at 04:11

## 2021-11-25 RX ADMIN — Medication 324 MG: at 09:11

## 2021-11-25 RX ADMIN — HYDROCODONE BITARTRATE AND ACETAMINOPHEN 1 TABLET: 5; 325 TABLET ORAL at 09:11

## 2021-11-25 RX ADMIN — HYDROCODONE BITARTRATE AND ACETAMINOPHEN 1 TABLET: 5; 325 TABLET ORAL at 01:11

## 2021-11-25 RX ADMIN — Medication 324 MG: at 12:11

## 2021-11-25 RX ADMIN — ACETAMINOPHEN 650 MG: 325 TABLET ORAL at 12:11

## 2021-11-27 ENCOUNTER — PATIENT MESSAGE (OUTPATIENT)
Dept: OTOLARYNGOLOGY | Facility: CLINIC | Age: 61
End: 2021-11-27
Payer: COMMERCIAL

## 2021-11-29 ENCOUNTER — OFFICE VISIT (OUTPATIENT)
Dept: OTOLARYNGOLOGY | Facility: CLINIC | Age: 61
End: 2021-11-29
Payer: COMMERCIAL

## 2021-11-29 VITALS
SYSTOLIC BLOOD PRESSURE: 116 MMHG | WEIGHT: 184.31 LBS | DIASTOLIC BLOOD PRESSURE: 81 MMHG | BODY MASS INDEX: 30.67 KG/M2 | HEART RATE: 92 BPM

## 2021-11-29 DIAGNOSIS — E04.2 MULTINODULAR GOITER (NONTOXIC): Primary | ICD-10-CM

## 2021-11-29 PROCEDURE — 99024 PR POST-OP FOLLOW-UP VISIT: ICD-10-PCS | Mod: S$GLB,,, | Performed by: NURSE PRACTITIONER

## 2021-11-29 PROCEDURE — 99024 POSTOP FOLLOW-UP VISIT: CPT | Mod: S$GLB,,, | Performed by: NURSE PRACTITIONER

## 2021-11-29 PROCEDURE — 99999 PR PBB SHADOW E&M-EST. PATIENT-LVL III: CPT | Mod: PBBFAC,,, | Performed by: NURSE PRACTITIONER

## 2021-11-29 PROCEDURE — 99999 PR PBB SHADOW E&M-EST. PATIENT-LVL III: ICD-10-PCS | Mod: PBBFAC,,, | Performed by: NURSE PRACTITIONER

## 2021-11-30 ENCOUNTER — PATIENT MESSAGE (OUTPATIENT)
Dept: OTOLARYNGOLOGY | Facility: CLINIC | Age: 61
End: 2021-11-30
Payer: COMMERCIAL

## 2021-12-01 ENCOUNTER — OFFICE VISIT (OUTPATIENT)
Dept: OTOLARYNGOLOGY | Facility: CLINIC | Age: 61
End: 2021-12-01
Payer: COMMERCIAL

## 2021-12-01 VITALS
SYSTOLIC BLOOD PRESSURE: 145 MMHG | HEART RATE: 96 BPM | WEIGHT: 183 LBS | DIASTOLIC BLOOD PRESSURE: 84 MMHG | BODY MASS INDEX: 30.45 KG/M2

## 2021-12-01 DIAGNOSIS — E04.2 MULTINODULAR GOITER (NONTOXIC): Primary | ICD-10-CM

## 2021-12-01 PROCEDURE — 99999 PR PBB SHADOW E&M-EST. PATIENT-LVL III: ICD-10-PCS | Mod: PBBFAC,,, | Performed by: NURSE PRACTITIONER

## 2021-12-01 PROCEDURE — 99024 PR POST-OP FOLLOW-UP VISIT: ICD-10-PCS | Mod: S$GLB,,, | Performed by: NURSE PRACTITIONER

## 2021-12-01 PROCEDURE — 99999 PR PBB SHADOW E&M-EST. PATIENT-LVL III: CPT | Mod: PBBFAC,,, | Performed by: NURSE PRACTITIONER

## 2021-12-01 PROCEDURE — 99024 POSTOP FOLLOW-UP VISIT: CPT | Mod: S$GLB,,, | Performed by: NURSE PRACTITIONER

## 2021-12-01 RX ORDER — CLINDAMYCIN HYDROCHLORIDE 300 MG/1
300 CAPSULE ORAL EVERY 8 HOURS
Qty: 30 CAPSULE | Refills: 0 | Status: SHIPPED | OUTPATIENT
Start: 2021-12-01 | End: 2021-12-11

## 2021-12-02 LAB
FINAL PATHOLOGIC DIAGNOSIS: NORMAL
Lab: NORMAL

## 2021-12-08 ENCOUNTER — PATIENT MESSAGE (OUTPATIENT)
Dept: OTOLARYNGOLOGY | Facility: CLINIC | Age: 61
End: 2021-12-08
Payer: COMMERCIAL

## 2021-12-16 ENCOUNTER — OFFICE VISIT (OUTPATIENT)
Dept: OTOLARYNGOLOGY | Facility: CLINIC | Age: 61
End: 2021-12-16
Payer: COMMERCIAL

## 2021-12-16 VITALS
SYSTOLIC BLOOD PRESSURE: 135 MMHG | DIASTOLIC BLOOD PRESSURE: 83 MMHG | BODY MASS INDEX: 30.45 KG/M2 | HEART RATE: 90 BPM | WEIGHT: 183 LBS

## 2021-12-16 DIAGNOSIS — E04.2 MULTINODULAR GOITER (NONTOXIC): Primary | ICD-10-CM

## 2021-12-16 PROCEDURE — 99999 PR PBB SHADOW E&M-EST. PATIENT-LVL III: ICD-10-PCS | Mod: PBBFAC,,, | Performed by: NURSE PRACTITIONER

## 2021-12-16 PROCEDURE — 99024 POSTOP FOLLOW-UP VISIT: CPT | Mod: S$GLB,,, | Performed by: NURSE PRACTITIONER

## 2021-12-16 PROCEDURE — 99024 PR POST-OP FOLLOW-UP VISIT: ICD-10-PCS | Mod: S$GLB,,, | Performed by: NURSE PRACTITIONER

## 2021-12-16 PROCEDURE — 99999 PR PBB SHADOW E&M-EST. PATIENT-LVL III: CPT | Mod: PBBFAC,,, | Performed by: NURSE PRACTITIONER

## 2022-01-12 ENCOUNTER — PATIENT MESSAGE (OUTPATIENT)
Dept: RESEARCH | Facility: HOSPITAL | Age: 62
End: 2022-01-12
Payer: COMMERCIAL

## 2022-01-14 LAB — NONINV COLON CA DNA+OCC BLD SCRN STL QL: NORMAL

## 2022-01-19 ENCOUNTER — PATIENT MESSAGE (OUTPATIENT)
Dept: PRIMARY CARE CLINIC | Facility: CLINIC | Age: 62
End: 2022-01-19
Payer: COMMERCIAL

## 2022-02-01 ENCOUNTER — PATIENT MESSAGE (OUTPATIENT)
Dept: PRIMARY CARE CLINIC | Facility: CLINIC | Age: 62
End: 2022-02-01
Payer: COMMERCIAL

## 2022-02-01 LAB — NONINV COLON CA DNA+OCC BLD SCRN STL QL: NEGATIVE

## 2022-02-10 ENCOUNTER — CLINICAL SUPPORT (OUTPATIENT)
Dept: OTHER | Facility: CLINIC | Age: 62
End: 2022-02-10
Payer: COMMERCIAL

## 2022-02-10 ENCOUNTER — PATIENT MESSAGE (OUTPATIENT)
Dept: PRIMARY CARE CLINIC | Facility: CLINIC | Age: 62
End: 2022-02-10
Payer: COMMERCIAL

## 2022-02-10 DIAGNOSIS — Z00.8 ENCOUNTER FOR OTHER GENERAL EXAMINATION: ICD-10-CM

## 2022-02-11 VITALS — HEIGHT: 65 IN | BODY MASS INDEX: 30.45 KG/M2

## 2022-02-11 LAB
HDLC SERPL-MCNC: 33 MG/DL
POC CHOLESTEROL, LDL (DOCK): 143 MG/DL
POC CHOLESTEROL, TOTAL: 199 MG/DL
POC GLUCOSE, FASTING: 126 MG/DL (ref 60–110)
TRIGL SERPL-MCNC: 116 MG/DL

## 2022-03-16 ENCOUNTER — PATIENT MESSAGE (OUTPATIENT)
Dept: ADMINISTRATIVE | Facility: HOSPITAL | Age: 62
End: 2022-03-16
Payer: COMMERCIAL

## 2022-04-02 ENCOUNTER — IMMUNIZATION (OUTPATIENT)
Dept: INTERNAL MEDICINE | Facility: CLINIC | Age: 62
End: 2022-04-02
Payer: COMMERCIAL

## 2022-04-02 DIAGNOSIS — Z23 NEED FOR VACCINATION: Primary | ICD-10-CM

## 2022-04-02 PROCEDURE — 91305 COVID-19, MRNA, LNP-S, PF, 30 MCG/0.3 ML DOSE VACCINE (PFIZER): CPT | Mod: PBBFAC | Performed by: INTERNAL MEDICINE

## 2022-04-13 DIAGNOSIS — K21.9 GASTROESOPHAGEAL REFLUX DISEASE WITHOUT ESOPHAGITIS: ICD-10-CM

## 2022-04-14 RX ORDER — ESOMEPRAZOLE MAGNESIUM 40 MG/1
40 CAPSULE, DELAYED RELEASE ORAL
Qty: 90 CAPSULE | Refills: 2 | Status: SHIPPED | OUTPATIENT
Start: 2022-04-14 | End: 2023-01-23 | Stop reason: SDUPTHER

## 2022-04-14 NOTE — TELEPHONE ENCOUNTER
Care Due:                  Date            Visit Type   Department     Provider  --------------------------------------------------------------------------------                                EP -         [facility] FAM[facility]                              PRIMARY      MEDICINE/  Last Visit: 11-      CARE ([facility])   INTERNAL MED   Tere Brown  Next Visit: None Scheduled  None         None Found                                                            Last  Test          Frequency    Reason                     Performed    Due Date  --------------------------------------------------------------------------------    HBA1C.......  6 months...  metFORMIN................  11- 05-    Powered by [employer]. Reference number: [id number].   4/13/2022 8:27:30 PM CDT

## 2022-04-14 NOTE — TELEPHONE ENCOUNTER
Refill Routing Note   Medication(s) are not appropriate for processing by [facility] for the following reason(s):      - Patient has been seen in the ED/Hospital since the last PCP visit    [facility] action(s):                   Appointments  past 12m or future 3m with PCP    Date Provider   Last Visit   11/3/2021 Tere Brown MD   Next Visit   Visit date not found Tere Brown MD   ED visits in past 90 days: 0        Note composed:8:28 PM 04/13/2022

## 2022-05-24 DIAGNOSIS — Z72.820 POOR SLEEP: ICD-10-CM

## 2022-05-24 RX ORDER — HYDROXYZINE HYDROCHLORIDE 10 MG/1
10 TABLET, FILM COATED ORAL NIGHTLY PRN
Qty: 90 TABLET | Refills: 1 | Status: SHIPPED | OUTPATIENT
Start: 2022-05-24 | End: 2023-01-23 | Stop reason: SDUPTHER

## 2022-05-30 ENCOUNTER — PATIENT MESSAGE (OUTPATIENT)
Dept: ADMINISTRATIVE | Facility: HOSPITAL | Age: 62
End: 2022-05-30
Payer: COMMERCIAL

## 2022-06-02 ENCOUNTER — HOSPITAL ENCOUNTER (EMERGENCY)
Facility: HOSPITAL | Age: 62
Discharge: HOME | End: 2022-06-02
Attending: EMERGENCY MEDICINE
Payer: COMMERCIAL

## 2022-06-02 ENCOUNTER — PATIENT MESSAGE (OUTPATIENT)
Dept: PRIMARY CARE CLINIC | Facility: CLINIC | Age: 62
End: 2022-06-02
Payer: COMMERCIAL

## 2022-06-02 VITALS
HEIGHT: 65 IN | TEMPERATURE: 98 F | BODY MASS INDEX: 31.16 KG/M2 | WEIGHT: 187 LBS | RESPIRATION RATE: 18 BRPM | HEART RATE: 73 BPM | DIASTOLIC BLOOD PRESSURE: 77 MMHG | SYSTOLIC BLOOD PRESSURE: 138 MMHG | OXYGEN SATURATION: 97 %

## 2022-06-02 DIAGNOSIS — K57.92 DIVERTICULITIS: Primary | ICD-10-CM

## 2022-06-02 DIAGNOSIS — R91.1 PULMONARY NODULE: ICD-10-CM

## 2022-06-02 LAB
ALBUMIN SERPL BCP-MCNC: 3.7 G/DL (ref 3.5–5.2)
ALP SERPL-CCNC: 93 U/L (ref 55–135)
ALT SERPL W/O P-5'-P-CCNC: 14 U/L (ref 10–44)
ANION GAP SERPL CALC-SCNC: 11 MMOL/L (ref 8–16)
AST SERPL-CCNC: 14 U/L (ref 10–40)
BACTERIA #/AREA URNS AUTO: ABNORMAL /HPF
BASOPHILS # BLD AUTO: 0.06 K/UL (ref 0–0.2)
BASOPHILS NFR BLD: 0.5 % (ref 0–1.9)
BILIRUB SERPL-MCNC: 0.9 MG/DL (ref 0.1–1)
BILIRUB UR QL STRIP: NEGATIVE
BUN SERPL-MCNC: 13 MG/DL (ref 6–20)
CALCIUM SERPL-MCNC: 9.5 MG/DL (ref 8.7–10.5)
CHLORIDE SERPL-SCNC: 105 MMOL/L (ref 95–110)
CLARITY UR REFRACT.AUTO: ABNORMAL
CO2 SERPL-SCNC: 23 MMOL/L (ref 23–29)
COLOR UR AUTO: YELLOW
CREAT SERPL-MCNC: 0.7 MG/DL (ref 0.5–1.4)
DIFFERENTIAL METHOD: ABNORMAL
EOSINOPHIL # BLD AUTO: 0.1 K/UL (ref 0–0.5)
EOSINOPHIL NFR BLD: 0.5 % (ref 0–8)
ERYTHROCYTE [DISTWIDTH] IN BLOOD BY AUTOMATED COUNT: 14 % (ref 11.5–14.5)
EST. GFR  (AFRICAN AMERICAN): >60 ML/MIN/1.73 M^2
EST. GFR  (NON AFRICAN AMERICAN): >60 ML/MIN/1.73 M^2
GLUCOSE SERPL-MCNC: 116 MG/DL (ref 70–110)
GLUCOSE UR QL STRIP: NEGATIVE
HCT VFR BLD AUTO: 41 % (ref 37–48.5)
HCV AB SERPL QL IA: NEGATIVE
HGB BLD-MCNC: 13.2 G/DL (ref 12–16)
HGB UR QL STRIP: NEGATIVE
HIV 1+2 AB+HIV1 P24 AG SERPL QL IA: NEGATIVE
IMM GRANULOCYTES # BLD AUTO: 0.06 K/UL (ref 0–0.04)
IMM GRANULOCYTES NFR BLD AUTO: 0.5 % (ref 0–0.5)
KETONES UR QL STRIP: NEGATIVE
LEUKOCYTE ESTERASE UR QL STRIP: ABNORMAL
LIPASE SERPL-CCNC: 21 U/L (ref 4–60)
LYMPHOCYTES # BLD AUTO: 2.7 K/UL (ref 1–4.8)
LYMPHOCYTES NFR BLD: 24.2 % (ref 18–48)
MCH RBC QN AUTO: 26.1 PG (ref 27–31)
MCHC RBC AUTO-ENTMCNC: 32.2 G/DL (ref 32–36)
MCV RBC AUTO: 81 FL (ref 82–98)
MICROSCOPIC COMMENT: ABNORMAL
MONOCYTES # BLD AUTO: 0.8 K/UL (ref 0.3–1)
MONOCYTES NFR BLD: 7 % (ref 4–15)
NEUTROPHILS # BLD AUTO: 7.5 K/UL (ref 1.8–7.7)
NEUTROPHILS NFR BLD: 67.3 % (ref 38–73)
NITRITE UR QL STRIP: NEGATIVE
NRBC BLD-RTO: 0 /100 WBC
PH UR STRIP: 6 [PH] (ref 5–8)
PLATELET # BLD AUTO: 310 K/UL (ref 150–450)
PMV BLD AUTO: 10 FL (ref 9.2–12.9)
POTASSIUM SERPL-SCNC: 3.5 MMOL/L (ref 3.5–5.1)
PROT SERPL-MCNC: 7.8 G/DL (ref 6–8.4)
PROT UR QL STRIP: NEGATIVE
RBC # BLD AUTO: 5.06 M/UL (ref 4–5.4)
RBC #/AREA URNS AUTO: 1 /HPF (ref 0–4)
SODIUM SERPL-SCNC: 139 MMOL/L (ref 136–145)
SP GR UR STRIP: 1.02 (ref 1–1.03)
SQUAMOUS #/AREA URNS AUTO: 1 /HPF
URN SPEC COLLECT METH UR: ABNORMAL
WBC # BLD AUTO: 11.19 K/UL (ref 3.9–12.7)
WBC #/AREA URNS AUTO: 14 /HPF (ref 0–5)

## 2022-06-02 PROCEDURE — 99285 EMERGENCY DEPT VISIT HI MDM: CPT | Mod: 25

## 2022-06-02 PROCEDURE — 25000003 PHARM REV CODE 250: Performed by: PHYSICIAN ASSISTANT

## 2022-06-02 PROCEDURE — 81001 URINALYSIS AUTO W/SCOPE: CPT | Performed by: PHYSICIAN ASSISTANT

## 2022-06-02 PROCEDURE — 96375 TX/PRO/DX INJ NEW DRUG ADDON: CPT

## 2022-06-02 PROCEDURE — 87389 HIV-1 AG W/HIV-1&-2 AB AG IA: CPT | Performed by: EMERGENCY MEDICINE

## 2022-06-02 PROCEDURE — 96374 THER/PROPH/DIAG INJ IV PUSH: CPT

## 2022-06-02 PROCEDURE — 96361 HYDRATE IV INFUSION ADD-ON: CPT

## 2022-06-02 PROCEDURE — 99284 EMERGENCY DEPT VISIT MOD MDM: CPT | Mod: ,,, | Performed by: PHYSICIAN ASSISTANT

## 2022-06-02 PROCEDURE — 83690 ASSAY OF LIPASE: CPT | Performed by: PHYSICIAN ASSISTANT

## 2022-06-02 PROCEDURE — 63600175 PHARM REV CODE 636 W HCPCS: Performed by: PHYSICIAN ASSISTANT

## 2022-06-02 PROCEDURE — 85025 COMPLETE CBC W/AUTO DIFF WBC: CPT | Performed by: PHYSICIAN ASSISTANT

## 2022-06-02 PROCEDURE — 86803 HEPATITIS C AB TEST: CPT | Performed by: EMERGENCY MEDICINE

## 2022-06-02 PROCEDURE — 80053 COMPREHEN METABOLIC PANEL: CPT | Performed by: PHYSICIAN ASSISTANT

## 2022-06-02 PROCEDURE — 99284 PR EMERGENCY DEPT VISIT,LEVEL IV: ICD-10-PCS | Mod: ,,, | Performed by: PHYSICIAN ASSISTANT

## 2022-06-02 PROCEDURE — 87086 URINE CULTURE/COLONY COUNT: CPT | Performed by: PHYSICIAN ASSISTANT

## 2022-06-02 RX ORDER — ONDANSETRON 2 MG/ML
4 INJECTION INTRAMUSCULAR; INTRAVENOUS
Status: COMPLETED | OUTPATIENT
Start: 2022-06-02 | End: 2022-06-02

## 2022-06-02 RX ORDER — CIPROFLOXACIN 500 MG/1
500 TABLET ORAL 2 TIMES DAILY
Qty: 20 TABLET | Refills: 0 | Status: SHIPPED | OUTPATIENT
Start: 2022-06-02 | End: 2022-08-31 | Stop reason: SDUPTHER

## 2022-06-02 RX ORDER — NAPROXEN 500 MG/1
500 TABLET ORAL 2 TIMES DAILY WITH MEALS
Qty: 20 TABLET | Refills: 0 | Status: SHIPPED | OUTPATIENT
Start: 2022-06-02 | End: 2022-12-20 | Stop reason: ALTCHOICE

## 2022-06-02 RX ORDER — METRONIDAZOLE 500 MG/1
500 TABLET ORAL
Status: COMPLETED | OUTPATIENT
Start: 2022-06-02 | End: 2022-06-02

## 2022-06-02 RX ORDER — KETOROLAC TROMETHAMINE 30 MG/ML
10 INJECTION, SOLUTION INTRAMUSCULAR; INTRAVENOUS
Status: COMPLETED | OUTPATIENT
Start: 2022-06-02 | End: 2022-06-02

## 2022-06-02 RX ORDER — CIPROFLOXACIN 500 MG/1
500 TABLET ORAL
Status: COMPLETED | OUTPATIENT
Start: 2022-06-02 | End: 2022-06-02

## 2022-06-02 RX ORDER — ONDANSETRON 4 MG/1
4 TABLET, ORALLY DISINTEGRATING ORAL EVERY 8 HOURS PRN
Qty: 12 TABLET | Refills: 0 | Status: SHIPPED | OUTPATIENT
Start: 2022-06-02 | End: 2022-12-20 | Stop reason: ALTCHOICE

## 2022-06-02 RX ORDER — METRONIDAZOLE 500 MG/1
500 TABLET ORAL 3 TIMES DAILY
Qty: 30 TABLET | Refills: 0 | Status: SHIPPED | OUTPATIENT
Start: 2022-06-02 | End: 2022-08-31 | Stop reason: SDUPTHER

## 2022-06-02 RX ADMIN — METRONIDAZOLE 500 MG: 500 TABLET ORAL at 02:06

## 2022-06-02 RX ADMIN — ONDANSETRON 4 MG: 2 INJECTION INTRAMUSCULAR; INTRAVENOUS at 01:06

## 2022-06-02 RX ADMIN — CIPROFLOXACIN 500 MG: 500 TABLET, FILM COATED ORAL at 02:06

## 2022-06-02 RX ADMIN — KETOROLAC TROMETHAMINE 10 MG: 30 INJECTION, SOLUTION INTRAMUSCULAR at 01:06

## 2022-06-02 NOTE — ED PROVIDER NOTES
Encounter Date: 6/2/2022       History     Chief Complaint   Patient presents with    Multiple Complaints     Abdominal pain, n/v and diarrhea     62-year-old female with history of hyperlipidemia, GERD presents to the ED complaining of right lower quadrant abdominal pain that started yesterday evening.  She reports the pain has been constant, progressively worsening, 8/10 sharp.  She endorses associated fever (100° F), fatigue, chills, headache, nausea, vomiting, diarrhea.  No abdominal trauma.  Past surgical history significant for cholecystectomy, total abdominal hysterectomy, thyroidectomy.  She does have a decreased appetite.  She denies chest pain, shortness breath, dysuria, hematuria, lightheadedness.    The history is provided by the patient.     Review of patient's allergies indicates:   Allergen Reactions    Coconut Hives     Past Medical History:   Diagnosis Date    Adrenal mass, right     GERD (gastroesophageal reflux disease)     Hyperlipidemia      Past Surgical History:   Procedure Laterality Date    BILE DUCT STENT PLACEMENT      CHOLECYSTECTOMY      FRACTURE SURGERY  2006 & 2010    Ankle, place pin the remove pin    HYSTERECTOMY  1999    KRISTIN with BSO secondary to family hx ovarian ca and endometriosis    R ankle surgery Right     THYROID LOBECTOMY Left 11/24/2021    Procedure: LOBECTOMY, THYROID;  Surgeon: Reyes Harrison MD;  Location: [address];  Service: ENT;  Laterality: Left;    toe fracture Left     4th metatarsal     Family History   Problem Relation Age of Onset    Cancer Mother         ovarian    Cancer Maternal Grandmother         ovarian     Social History     Tobacco Use    Smoking status: Never Smoker    Smokeless tobacco: Never Used   Substance Use Topics    Alcohol use: Yes     Alcohol/week: 1.0 standard drink     Types: 1 Glasses of wine per week     Comment: social    Drug use: Never     Review of Systems   Constitutional: Positive for chills, fatigue  and fever.   HENT: Negative for congestion, postnasal drip and sore throat.    Eyes: Negative for photophobia and visual disturbance.   Respiratory: Negative for cough and shortness of breath.    Cardiovascular: Negative for chest pain.   Gastrointestinal: Positive for abdominal pain, diarrhea, nausea and vomiting. Negative for blood in stool and constipation.   Genitourinary: Negative for dysuria and hematuria.   Musculoskeletal: Negative for back pain.   Skin: Negative for rash and wound.   Neurological: Positive for headaches. Negative for syncope, weakness and light-headedness.   Psychiatric/Behavioral: Negative for confusion.       Physical Exam     Initial Vitals [06/02/22 1142]   BP Pulse Resp Temp SpO2   139/84 100 18 98.2 °F (36.8 °C) 96 %      MAP       --         Physical Exam    Nursing note and vitals reviewed.  Constitutional: She appears well-developed and well-nourished. She is not diaphoretic. No distress.   Appears uncomfortable secondary to pain   HENT:   Head: Normocephalic and atraumatic.   Neck: Neck supple.   Normal range of motion.  Cardiovascular: Normal rate, regular rhythm and normal heart sounds. Exam reveals no gallop and no friction rub.    No murmur heard.  Pulmonary/Chest: Breath sounds normal. She has no wheezes. She has no rhonchi. She has no rales.   Abdominal: Abdomen is soft. Bowel sounds are normal. There is abdominal tenderness in the right lower quadrant and suprapubic area.   No right CVA tenderness.  No left CVA tenderness. There is no rebound and no guarding.   Musculoskeletal:         General: Normal range of motion.      Cervical back: Normal range of motion and neck supple.     Neurological: She is alert and oriented to person, place, and time.   Skin: Skin is warm and dry. No rash noted. No erythema.   Psychiatric: She has a normal mood and affect.         ED Course   Procedures  Labs Reviewed   CBC W/ AUTO DIFFERENTIAL - Abnormal; Notable for the following components:        Result Value    MCV 81 (*)     MCH 26.1 (*)     Immature Grans (Abs) 0.06 (*)     All other components within normal limits   COMPREHENSIVE METABOLIC PANEL - Abnormal; Notable for the following components:    Glucose 116 (*)     All other components within normal limits   URINALYSIS, REFLEX TO URINE CULTURE - Abnormal; Notable for the following components:    Appearance, UA Hazy (*)     Leukocytes, UA 1+ (*)     All other components within normal limits    Narrative:     Specimen Source->Urine   URINALYSIS MICROSCOPIC - Abnormal; Notable for the following components:    WBC, UA 14 (*)     All other components within normal limits    Narrative:     Specimen Source->Urine   CULTURE, URINE   HIV 1 / 2 ANTIBODY    Narrative:     Release to patient->Immediate   HEPATITIS C ANTIBODY    Narrative:     Release to patient->Immediate   LIPASE          Imaging Results           CT Abdomen Pelvis  Without Contrast (Final result)  Result time 06/02/22 13:32:53    Final result by Rafael Harding MD (06/02/22 13:32:53)                 Impression:      1. Normal appendix.  No localized findings within the right lower quadrant.  2. Findings suggesting mid sigmoid acute, uncomplicated diverticulitis.  Consider follow-up with imaging or endoscopy after therapy according to colorectal screening guidelines.  3. Bilateral renal solitary nonobstructing nephroliths.  4. Remote cholecystectomy and hysterectomy.  5. New fluid attenuation mass at the gallbladder fossa which may reflect a biloma.  6. Near-total resolution of previous pneumobilia without associated significant biliary ductal dilatation.  7. Hepatomegaly and stable 2 hepatic subcentimeter hypoattenuating parenchymal foci which are too small to characterize.  8. Interval slightly larger right adrenal nodule suggestive of an adenoma.  9. Right renal lower pole 1.1 cm hypoattenuating cortical lesion suggestive of a cyst but incompletely characterized.  New left renal lower  pole subcentimeter fat density cortical lesion suggestive of a AML, but incompletely characterize.  Further evaluation with elective/nonemergent renal ultrasound can be obtained as warranted to confirm cystic components.  10. Right lower lobe new 2 mm solid nodule.  For a solid nodule <6 mm, Fleischner Society 2017 guidelines recommend no routine follow up for a low risk patient, or follow-up with non-contrast chest CT at 12 months in a high risk patient.  11. Few additional findings as above.  This report was flagged in Epic as abnormal.  This report was flagged in Epic as containing an incidental finding.      Electronically signed by: Rafael Harding MD  Date:    06/02/2022  Time:    13:32             Narrative:    EXAMINATION:  CT ABDOMEN PELVIS WITHOUT CONTRAST    CLINICAL HISTORY:  RLQ abdominal pain (Age >= 14y);    TECHNIQUE:  Low dose axial images, sagittal and coronal reformations were obtained from the lung bases to the pubic symphysis.  No contrast media was utilized.    COMPARISON:  CT abdomen and pelvis 12/29/2018    FINDINGS:  Imaged lung bases show new 2 mm solid nodule within the posterior right lower lobe best seen on axial image 11 of series 2, and minimal platelike scarring versus atelectasis without consolidation or pleural effusion.  Base of the heart is within normal limits.    Small hiatal hernia.    Liver is enlarged measuring 18.9 cm in maximum coronal length noting stable 2 mm parenchymal calcification within the anterior right lobe.  Punctate focus of suspected pneumobilia within the right intrahepatic bile ducts with resolution of previous scattered areas of pneumobilia.  Grossly stable 2 subcentimeter hypoattenuating parenchymal foci within the left hepatic lobe which are too small to characterize but likely represent cysts given the stability.  At the gallbladder fossa region there is a new well-circumscribed fluid attenuation mass measuring 2.3 x 4.9 cm without adjacent inflammation, and  may reflect a biloma.  No significant biliary ductal dilatation.    Noncontrast appearance of the pancreas, stomach, duodenum and left adrenal gland are within normal limits.  4 cm homogeneous fat density nodule at the right adrenal gland consistent with a lipid rich adenoma, slightly larger and previously measured 3.4 cm in 2018.  Spleen is normal in size noting grossly stable subcentimeter hypoattenuating area along the anterior and lateral aspect which is too small to characterize.  Small accessory splenule at its hilum unchanged.    Bilateral kidneys are normal in size, shape and location.  Punctate hyperattenuating caliceal focus at the right renal upper pole suggestive of a nonobstructing nephrolith.  1-2 mm nephrolith at the left renal interpolar region.  Few scattered subcentimeter hypoattenuating cortical foci at each kidney which are too small to characterize.  Subcentimeter fat density cortical lesion at the left renal lower pole anteriorly new from prior suggestive of a tiny AML but too small to adequately characterize.  1.1 cm hypodense cortical lesion at the right renal lower pole with average 15 Hounsfield units slightly more conspicuous on today's study and remains incompletely characterized.  No hydronephrosis or significant perinephric stranding.  Ureters are nondilated.  Urinary bladder is suboptimally distended.  Uterus is surgically absent.  No adnexal mass.  Pelvic phleboliths noted.    Appendix and terminal ileum are within normal limits.  Several scattered diverticula of the sigmoid colon noting moderate segmental length abnormal circumferential wall thickening of the mid sigmoid colon with mild stranding of the adjacent pericolonic fat suggesting acute diverticulitis.  No evidence of bowel perforation or drainable abscess at this time.  No bowel obstruction.  Remaining small and large bowel loops are within normal limits.  No pneumatosis or portal venous gas.    Tiny fat containing umbilical  hernia.    No ascites, free air or lymphadenopathy by CT criteria noting numerous scattered subcentimeter mesenteric, retroperitoneal and inguinal lymph nodes.  No significant calcific atherosclerosis.  Aorta tapers normally.    Osseous structures show degenerative changes most prominent at L4-5 and L5-S1 levels with degenerative related minimal grade 1 anterolisthesis of L4 on 5, slightly progressed from 2018.  No acute or destructive osseous process seen.                                 Medications   sodium chloride 0.9% bolus 1,000 mL (0 mLs Intravenous Stopped 6/2/22 1401)   ondansetron injection 4 mg (4 mg Intravenous Given 6/2/22 1311)   ketorolac injection 9.999 mg (9.999 mg Intravenous Given 6/2/22 1312)   ciprofloxacin HCl tablet 500 mg (500 mg Oral Given 6/2/22 1450)   metroNIDAZOLE tablet 500 mg (500 mg Oral Given 6/2/22 1450)     Medical Decision Making:   History:   Old Medical Records: I decided to obtain old medical records.  Clinical Tests:   Lab Tests: Ordered and Reviewed  Radiological Study: Ordered and Reviewed       APC / Resident Notes:   62-year-old female with history of hyperlipidemia, GERD presents to the ED complaining of right lower quadrant abdominal pain that started yesterday evening. VSS. Appears uncomfortable secondary to pain. Lungs clear. Abdomen soft, tender in the suprapubic region and RLQ. No CVA tenderness. DDx includes but is not limited to appendicitis, UTI, pyelonephritis, nephrolithiasis, SBO, colitis. Will get labs, CT abdomen/pelvis.    No leukocytosis or anemia. UA shows 14 WBC - she denies any urinary symptoms. Urine culture pending. CMP unremarkable. Lipase normal.     CT shows uncomplicated diverticulitis. Additional incidental findings relayed to patient.    Pain improved in the ED. Given po ciprofloxacin and flagyl.    I do not feel that she needs any further labs or imaging at this time. Stable for discharge.     She was discharged with prescriptions for  ciprofloxacin, flagyl, naproxen, zofran.  She will follow up with her PCP.  Strict ED return precautions given.  All of the patient's questions were answered.  I reviewed the patient's chart, labs, and imaging and discussed the case with my supervising physician.                     Clinical Impression:   Final diagnoses:  [K57.92] Diverticulitis (Primary)  [R91.1] Pulmonary nodule          ED Disposition Condition    Discharge Stable        ED Prescriptions     Medication Sig Dispense Start Date End Date Auth. Provider    ciprofloxacin HCl (CIPRO) 500 MG tablet Take 1 tablet (500 mg total) by mouth 2 (two) times daily for 10 days 20 tablet 6/2/2022 6/12/2022 Radha Sullivan PA-C    metroNIDAZOLE (FLAGYL) 500 MG tablet Take 1 tablet (500 mg total) by mouth 3 (three) times daily for 10 days 30 tablet 6/2/2022 6/12/2022 Radha Sullivan PA-C    naproxen (NAPROSYN) 500 MG tablet Take 1 tablet (500 mg total) by mouth 2 (two) times daily with meals. 20 tablet 6/2/2022  Radha Sullivan PA-C    ondansetron (ZOFRAN-ODT) 4 MG TbDL Dissolve 1 tablet (4 mg total) by mouth every 8 (eight) hours as needed (nausea). 12 tablet 6/2/2022  Radha Sullivan PA-C        Follow-up Information     Follow up With Specialties Details Why Contact Info    Tere Brown MD Family Medicine   [address]  [place] [address]  [phone]             Radha Sullivan PA-C  06/02/22 2680

## 2022-06-02 NOTE — ED NOTES
Teodora Yuen, an 62 y.o. female presents to the ED with reports of Abdominal pain, n/v and diarrhea    Review of patient's allergies indicates:   Allergen Reactions    Coconut Hives     Chief Complaint   Patient presents with    Multiple Complaints     Abdominal pain, n/v and diarrhea     Past Medical History:   Diagnosis Date    Adrenal mass, right     GERD (gastroesophageal reflux disease)     Hyperlipidemia           Pt alert and oriented x4, VSS,  Airway intact, respirations even and nonlabored, no bowel or bladder incontinence. +2 pulses to all four extremities, no edema or deformities noted.   Physical Exam  Constitutional:       Appearance: He is not toxic-appearing.  HENT:     Head: Normocephalic and atraumatic.     Mouth/Throat:     Mouth: Mucous membranes are moist.  Eyes:     Extraocular Movements: Extraocular movements intact.     Conjunctiva/sclera: Conjunctivae normal.     Pupils: Pupils are equal, round, and reactive to light.  Neck:     Vascular: No JVD.  Cardiovascular:     Rate and Rhythm: Normal rate and regular rhythm.     Heart sounds: Normal heart sounds. No murmur. No friction rub. No gallop.    Pulmonary:     Effort: Pulmonary effort is normal. No respiratory distress.     Breath sounds: Normal breath sounds. No wheezing or rales.  Abdominal:Abdominal pain, n/v and diarrhea     General: Bowel sounds are normal. There is no distension.     Palpations: Abdomen is soft.     Tenderness: There is no tenderness reported. There is no guarding or rebound.     Hernia: No hernia is present.  Skin:     General: Skin is warm and dry.     Findings: No rash.  Neurological:     General: No focal deficit present.     Mental Status: He is alert and oriented to person, place, and time.     Cranial Nerves: No cranial nerve deficit.     Sensory: No sensory deficit.

## 2022-06-03 ENCOUNTER — PATIENT MESSAGE (OUTPATIENT)
Dept: PRIMARY CARE CLINIC | Facility: CLINIC | Age: 62
End: 2022-06-03
Payer: COMMERCIAL

## 2022-06-03 LAB — BACTERIA UR CULT: NO GROWTH

## 2022-07-12 ENCOUNTER — PATIENT MESSAGE (OUTPATIENT)
Dept: ADMINISTRATIVE | Facility: HOSPITAL | Age: 62
End: 2022-07-12
Payer: COMMERCIAL

## 2022-08-03 ENCOUNTER — PATIENT MESSAGE (OUTPATIENT)
Dept: PRIMARY CARE CLINIC | Facility: CLINIC | Age: 62
End: 2022-08-03
Payer: COMMERCIAL

## 2022-08-03 ENCOUNTER — PATIENT MESSAGE (OUTPATIENT)
Dept: ADMINISTRATIVE | Facility: OTHER | Age: 62
End: 2022-08-03
Payer: COMMERCIAL

## 2022-08-03 DIAGNOSIS — R09.89 CHEST CONGESTION: Primary | ICD-10-CM

## 2022-08-03 LAB
CTP QC/QA: YES
SARS-COV-2 AG RESP QL IA.RAPID: NEGATIVE

## 2022-08-03 NOTE — TELEPHONE ENCOUNTER
Please inform pt that she can continue with OTC cough/cold meds as needed if still symptomatic. NGUYEN Palacio

## 2022-08-25 ENCOUNTER — PATIENT MESSAGE (OUTPATIENT)
Dept: PRIMARY CARE CLINIC | Facility: CLINIC | Age: 62
End: 2022-08-25
Payer: COMMERCIAL

## 2022-08-25 ENCOUNTER — TELEPHONE (OUTPATIENT)
Dept: PRIMARY CARE CLINIC | Facility: CLINIC | Age: 62
End: 2022-08-25
Payer: COMMERCIAL

## 2022-08-25 DIAGNOSIS — R73.03 PREDIABETES: ICD-10-CM

## 2022-08-25 DIAGNOSIS — Z00.00 ANNUAL PHYSICAL EXAM: Primary | ICD-10-CM

## 2022-08-25 DIAGNOSIS — E78.5 HYPERLIPIDEMIA, UNSPECIFIED HYPERLIPIDEMIA TYPE: ICD-10-CM

## 2022-08-25 DIAGNOSIS — E04.2 MULTINODULAR GOITER (NONTOXIC): ICD-10-CM

## 2022-08-25 DIAGNOSIS — D50.9 IRON DEFICIENCY ANEMIA, UNSPECIFIED IRON DEFICIENCY ANEMIA TYPE: ICD-10-CM

## 2022-08-26 ENCOUNTER — PATIENT MESSAGE (OUTPATIENT)
Dept: PRIMARY CARE CLINIC | Facility: CLINIC | Age: 62
End: 2022-08-26
Payer: COMMERCIAL

## 2022-08-31 ENCOUNTER — OFFICE VISIT (OUTPATIENT)
Dept: PRIMARY CARE CLINIC | Facility: CLINIC | Age: 62
End: 2022-08-31
Payer: COMMERCIAL

## 2022-08-31 VITALS — BODY MASS INDEX: 30.16 KG/M2 | WEIGHT: 181 LBS | HEIGHT: 65 IN

## 2022-08-31 DIAGNOSIS — R10.32 LLQ PAIN: Primary | ICD-10-CM

## 2022-08-31 PROCEDURE — 1159F PR MEDICATION LIST DOCUMENTED IN MEDICAL RECORD: ICD-10-PCS | Mod: CPTII,95,, | Performed by: INTERNAL MEDICINE

## 2022-08-31 PROCEDURE — 3008F BODY MASS INDEX DOCD: CPT | Mod: CPTII,95,, | Performed by: INTERNAL MEDICINE

## 2022-08-31 PROCEDURE — 99213 PR OFFICE/OUTPT VISIT, EST, LEVL III, 20-29 MIN: ICD-10-PCS | Mod: 95,,, | Performed by: INTERNAL MEDICINE

## 2022-08-31 PROCEDURE — 99213 OFFICE O/P EST LOW 20 MIN: CPT | Mod: 95,,, | Performed by: INTERNAL MEDICINE

## 2022-08-31 PROCEDURE — 3008F PR BODY MASS INDEX (BMI) DOCUMENTED: ICD-10-PCS | Mod: CPTII,95,, | Performed by: INTERNAL MEDICINE

## 2022-08-31 PROCEDURE — 1159F MED LIST DOCD IN RCRD: CPT | Mod: CPTII,95,, | Performed by: INTERNAL MEDICINE

## 2022-08-31 RX ORDER — METRONIDAZOLE 500 MG/1
500 TABLET ORAL 3 TIMES DAILY
Qty: 30 TABLET | Refills: 0 | Status: SHIPPED | OUTPATIENT
Start: 2022-08-31 | End: 2022-09-10

## 2022-08-31 RX ORDER — CIPROFLOXACIN 500 MG/1
500 TABLET ORAL 2 TIMES DAILY
Qty: 20 TABLET | Refills: 0 | Status: SHIPPED | OUTPATIENT
Start: 2022-08-31 | End: 2022-09-10

## 2022-08-31 NOTE — PROGRESS NOTES
[facility] Progress Note  8/31/2022    This was a virtual visit conducted via webcam.      Reason for Visit:      had concerns including Abdominal Pain.      History of Present Illness:     Pt seen today to discuss abdominal pain.  June 2nd - went to the ED with abdominal pain.  Diiverticulitis diagnosed after CT scan.  Tx with antibtioics- cipro and flagyl  Discussed diet for prevention.  Was doing better, but Another attack last week- same symptoms.  LLQ pain. + fevers  She caleld to see about getting abx but they advised she should be seen- this was first appt she could get.  She is no longer running fevers and the abdominal pain is better, though not resolved.  No vomiting.  No blood in stool.    Problem List:     Problem List:  2021-11: Multinodular goiter (nontoxic)  2018-12: Iron deficiency anemia  2017-11: Family history of ovarian cancer  2016-09: Prediabetes  2016-08: Adenoma of right adrenal gland  2016-08: Hyperlipidemia  2016-08: GERD (gastroesophageal reflux disease)        Surgical History:     She has a past surgical history that includes Cholecystectomy; Bile duct stent placement; toe fracture (Left); R ankle surgery (Right); Hysterectomy (1999); Fracture surgery (2006 & 2010); and Thyroid lobectomy (Left, 11/24/2021).      Family History      Her family history includes Cancer in her maternal grandmother and mother.      Social History:     She reports that she has never smoked. She has never used smokeless tobacco. She reports current alcohol use of about 1.0 standard drink per week. She reports that she does not use drugs.      Medications:       Current Outpatient Medications:     calcium carbonate-vitamin D3 500 mg(1,250mg) -400 unit Tab, once daily. , Disp: , Rfl:     esomeprazole (NEXIUM) 40 MG capsule, Take 1 capsule (40 mg total) by mouth daily before breakfast., Disp: 90 capsule, Rfl: 2    ferrous gluconate (FERGON) 324 MG tablet, Take 1 tablet (324 mg total) by  mouth 3 (three) times daily with meals., Disp: 90 tablet, Rfl: 4    hydrOXYzine HCL (ATARAX) 10 MG Tab, Take 1 tablet (10 mg total) by mouth nightly as needed (nightly as needed)., Disp: 90 tablet, Rfl: 1    metFORMIN (GLUCOPHAGE-XR) 500 MG ER 24hr tablet, Take 1 tablet (500 mg total) by mouth daily with breakfast., Disp: 90 tablet, Rfl: 3    ondansetron (ZOFRAN-ODT) 4 MG TbDL, Dissolve 1 tablet (4 mg total) by mouth every 8 (eight) hours as needed (nausea)., Disp: 12 tablet, Rfl: 0    pravastatin (PRAVACHOL) 40 MG tablet, Take 1 tablet (40 mg total) by mouth once daily., Disp: 90 tablet, Rfl: 3    ciprofloxacin HCl (CIPRO) 500 MG tablet, Take 1 tablet (500 mg total) by mouth 2 (two) times daily for 10 days, Disp: 20 tablet, Rfl: 0    metroNIDAZOLE (FLAGYL) 500 MG tablet, Take 1 tablet (500 mg total) by mouth 3 (three) times daily for 10 days, Disp: 30 tablet, Rfl: 0    naproxen (NAPROSYN) 500 MG tablet, Take 1 tablet (500 mg total) by mouth 2 (two) times daily with meals. (Patient not taking: Reported on 8/31/2022), Disp: 20 tablet, Rfl: 0    ondansetron (ZOFRAN-ODT) 4 MG TbDL, Take 1 tablet (4 mg total) by mouth every 8 (eight) hours as needed (nausea)., Disp: 15 tablet, Rfl: 0        Allergies:   She is allergic to coconut.      Review of Systems:     Review of Systems   Constitutional:  Negative for fever.   Gastrointestinal:  Positive for abdominal pain and nausea. Negative for constipation, diarrhea and vomiting.   Genitourinary:  Negative for dysuria, frequency and hematuria.   Musculoskeletal:  Negative for arthralgias and myalgias.   Neurological:  Negative for headaches.         Physical Exam:     Pt is alert and oriented.  Speech is clear and coherent.  Speaking in complete sentences.  No distress.  Mood and affect are normal.      Labs/Imaging/Testing:     Most recent CBC:  Lab Results   Component Value Date    WBC 11.19 06/02/2022    HGB 13.2 06/02/2022     06/02/2022    MCV 81 (L) 06/02/2022        Most recent Lipids:  Lab Results   Component Value Date    CHOL 178 01/21/2020    HDL 48 01/21/2020    LDLCALC 107.2 01/21/2020    TRIG 114 01/21/2020       Most recent Chemistry:  Lab Results   Component Value Date     06/02/2022    K 3.5 06/02/2022     06/02/2022    CO2 23 06/02/2022    BUN 13 06/02/2022    CREATININE 0.7 06/02/2022     (H) 06/02/2022    CALCIUM 9.5 06/02/2022    ALT 14 06/02/2022    AST 14 06/02/2022    ALKPHOS 93 06/02/2022    PROT 7.8 06/02/2022    ALBUMIN 3.7 06/02/2022       Other tests:  Lab Results   Component Value Date    TSH 0.942 11/04/2021    FREET4 0.83 11/04/2021    HGBA1C 6.4 (H) 11/04/2021    HGBA1C 6.4 (H) 11/04/2021    IRON 58 01/21/2020    FERRITIN 10 (L) 01/21/2020    VMTLDIDB72 367 08/25/2021    WUJMXACK44BE 20 (L) 11/04/2021    OZPOEQID10MW 20 (L) 11/04/2021    MG 2.0 08/25/2021    LIPASE 21 06/02/2022             Assessment and Plan:     1. LLQ pain  Will give cipro and flagyl to treat presumed diverticulitis episode  Symptomatically already improving some.  If worsening or failing to improve, advised to contact us to arrange in=-person eval.     Otis Reynolds MD  8/31/2022    This note was prepared using voice-recognition software.  Although efforts are made to proofread the note, some errors may persist in the final document.    Dr. Reynolds's [place] License number is [id number]  This was a virtual (audio/video visit) in lieu of in-person visit in context of the coronavirus emergency.      Patient/Family members identity was confirmed, and confidentiality/privacy confirmed prior to visit. Verbal informed consent was obtained from the patient's legal guardian or patient when appropriate to conduct this virtual visit.  They authorized me to provide medical care and voiced understanding of the risks, benefits, and alternatives of virtual care.  Guardian understands the limitations inherent of a virtual visit, that they may choose to be seen in person if  desired or needed, and that they may halt the virtual visit at any time for any reason.     Originating Site: Patient's home   Distant Site:  [facility]     I certify that this visit was done via secure two-way simultaneous audio and video transmission with informed consent of the patient and/or guardian. Over 50% of the time was counseling or coordinating care.

## 2022-09-24 ENCOUNTER — IMMUNIZATION (OUTPATIENT)
Dept: INTERNAL MEDICINE | Facility: CLINIC | Age: 62
End: 2022-09-24
Payer: COMMERCIAL

## 2022-09-24 PROCEDURE — 90686 FLU VACCINE (QUAD) GREATER THAN OR EQUAL TO 3YO PRESERVATIVE FREE IM: ICD-10-PCS | Mod: S$GLB,,, | Performed by: INTERNAL MEDICINE

## 2022-09-24 PROCEDURE — 90686 IIV4 VACC NO PRSV 0.5 ML IM: CPT | Mod: S$GLB,,, | Performed by: INTERNAL MEDICINE

## 2022-09-24 PROCEDURE — 90471 IMMUNIZATION ADMIN: CPT | Mod: S$GLB,,, | Performed by: INTERNAL MEDICINE

## 2022-09-24 PROCEDURE — 90471 FLU VACCINE (QUAD) GREATER THAN OR EQUAL TO 3YO PRESERVATIVE FREE IM: ICD-10-PCS | Mod: S$GLB,,, | Performed by: INTERNAL MEDICINE

## 2022-09-29 ENCOUNTER — IMMUNIZATION (OUTPATIENT)
Dept: INTERNAL MEDICINE | Facility: CLINIC | Age: 62
End: 2022-09-29
Payer: COMMERCIAL

## 2022-09-29 DIAGNOSIS — Z23 NEED FOR VACCINATION: Primary | ICD-10-CM

## 2022-09-29 PROCEDURE — 91312 COVID-19, MRNA, LNP-S, BIVALENT BOOSTER, PF, 30 MCG/0.3 ML DOSE: CPT | Mod: S$GLB,,, | Performed by: INTERNAL MEDICINE

## 2022-09-29 PROCEDURE — 91312 COVID-19, MRNA, LNP-S, BIVALENT BOOSTER, PF, 30 MCG/0.3 ML DOSE: ICD-10-PCS | Mod: S$GLB,,, | Performed by: INTERNAL MEDICINE

## 2022-09-29 PROCEDURE — 0124A COVID-19, MRNA, LNP-S, BIVALENT BOOSTER, PF, 30 MCG/0.3 ML DOSE: CPT | Mod: PBBFAC | Performed by: INTERNAL MEDICINE

## 2022-10-10 ENCOUNTER — PATIENT MESSAGE (OUTPATIENT)
Dept: ADMINISTRATIVE | Facility: HOSPITAL | Age: 62
End: 2022-10-10
Payer: COMMERCIAL

## 2022-10-25 DIAGNOSIS — E78.5 HYPERLIPIDEMIA, UNSPECIFIED HYPERLIPIDEMIA TYPE: ICD-10-CM

## 2022-10-25 NOTE — TELEPHONE ENCOUNTER
Care Due:                  Date            Visit Type   Department     Provider  --------------------------------------------------------------------------------                                EP -         [facility]                              PRIMARY      MEDICINE/  Last Visit: 11-      CARE ([facility])   INTERNAL MED   Tere Brown                               -                              PRIMARY      [facility] Primary  Next Visit: 12-      CARE ([facility])   Care           Tere Brown                                                            Last  Test          Frequency    Reason                     Performed    Due Date  --------------------------------------------------------------------------------    HBA1C.......  6 months...  metFORMIN................  11- 05-    [facility] Embedded Care Gaps. Reference number: [id number]. 10/25/2022   9:07:46 AM CDT

## 2022-10-26 RX ORDER — PRAVASTATIN SODIUM 40 MG/1
40 TABLET ORAL DAILY
Qty: 90 TABLET | Refills: 3 | Status: SHIPPED | OUTPATIENT
Start: 2022-10-26 | End: 2023-12-26 | Stop reason: SDUPTHER

## 2022-11-03 ENCOUNTER — LAB VISIT (OUTPATIENT)
Dept: LAB | Facility: HOSPITAL | Age: 62
End: 2022-11-03
Payer: COMMERCIAL

## 2022-11-03 DIAGNOSIS — Z00.00 ANNUAL PHYSICAL EXAM: ICD-10-CM

## 2022-11-03 DIAGNOSIS — E78.5 HYPERLIPIDEMIA, UNSPECIFIED HYPERLIPIDEMIA TYPE: ICD-10-CM

## 2022-11-03 DIAGNOSIS — E04.2 MULTINODULAR GOITER (NONTOXIC): ICD-10-CM

## 2022-11-03 DIAGNOSIS — R73.03 PREDIABETES: ICD-10-CM

## 2022-11-03 DIAGNOSIS — D50.9 IRON DEFICIENCY ANEMIA, UNSPECIFIED IRON DEFICIENCY ANEMIA TYPE: ICD-10-CM

## 2022-11-03 LAB
ALBUMIN SERPL BCP-MCNC: 3.6 G/DL (ref 3.5–5.2)
ALP SERPL-CCNC: 92 U/L (ref 55–135)
ALT SERPL W/O P-5'-P-CCNC: 17 U/L (ref 10–44)
ANION GAP SERPL CALC-SCNC: 10 MMOL/L (ref 8–16)
AST SERPL-CCNC: 17 U/L (ref 10–40)
BASOPHILS # BLD AUTO: 0.08 K/UL (ref 0–0.2)
BASOPHILS NFR BLD: 1 % (ref 0–1.9)
BILIRUB SERPL-MCNC: 0.6 MG/DL (ref 0.1–1)
BUN SERPL-MCNC: 21 MG/DL (ref 6–20)
CALCIUM SERPL-MCNC: 9.4 MG/DL (ref 8.7–10.5)
CHLORIDE SERPL-SCNC: 105 MMOL/L (ref 95–110)
CHOLEST SERPL-MCNC: 197 MG/DL (ref 120–199)
CHOLEST/HDLC SERPL: 4.6 {RATIO} (ref 2–5)
CO2 SERPL-SCNC: 22 MMOL/L (ref 23–29)
CREAT SERPL-MCNC: 0.7 MG/DL (ref 0.5–1.4)
DIFFERENTIAL METHOD: ABNORMAL
EOSINOPHIL # BLD AUTO: 0.1 K/UL (ref 0–0.5)
EOSINOPHIL NFR BLD: 1.8 % (ref 0–8)
ERYTHROCYTE [DISTWIDTH] IN BLOOD BY AUTOMATED COUNT: 13.8 % (ref 11.5–14.5)
EST. GFR  (NO RACE VARIABLE): >60 ML/MIN/1.73 M^2
ESTIMATED AVG GLUCOSE: 137 MG/DL (ref 68–131)
FERRITIN SERPL-MCNC: 31 NG/ML (ref 20–300)
GLUCOSE SERPL-MCNC: 118 MG/DL (ref 70–110)
HBA1C MFR BLD: 6.4 % (ref 4–5.6)
HCT VFR BLD AUTO: 38.3 % (ref 37–48.5)
HDLC SERPL-MCNC: 43 MG/DL (ref 40–75)
HDLC SERPL: 21.8 % (ref 20–50)
HGB BLD-MCNC: 12.3 G/DL (ref 12–16)
IMM GRANULOCYTES # BLD AUTO: 0.05 K/UL (ref 0–0.04)
IMM GRANULOCYTES NFR BLD AUTO: 0.6 % (ref 0–0.5)
IRON SERPL-MCNC: 69 UG/DL (ref 30–160)
LDLC SERPL CALC-MCNC: 125.6 MG/DL (ref 63–159)
LYMPHOCYTES # BLD AUTO: 2.5 K/UL (ref 1–4.8)
LYMPHOCYTES NFR BLD: 31.3 % (ref 18–48)
MCH RBC QN AUTO: 26.7 PG (ref 27–31)
MCHC RBC AUTO-ENTMCNC: 32.1 G/DL (ref 32–36)
MCV RBC AUTO: 83 FL (ref 82–98)
MONOCYTES # BLD AUTO: 0.5 K/UL (ref 0.3–1)
MONOCYTES NFR BLD: 6.4 % (ref 4–15)
NEUTROPHILS # BLD AUTO: 4.6 K/UL (ref 1.8–7.7)
NEUTROPHILS NFR BLD: 58.9 % (ref 38–73)
NONHDLC SERPL-MCNC: 154 MG/DL
NRBC BLD-RTO: 0 /100 WBC
PLATELET # BLD AUTO: 374 K/UL (ref 150–450)
PMV BLD AUTO: 10.2 FL (ref 9.2–12.9)
POTASSIUM SERPL-SCNC: 3.7 MMOL/L (ref 3.5–5.1)
PROT SERPL-MCNC: 7.4 G/DL (ref 6–8.4)
RBC # BLD AUTO: 4.61 M/UL (ref 4–5.4)
SATURATED IRON: 16 % (ref 20–50)
SODIUM SERPL-SCNC: 137 MMOL/L (ref 136–145)
TOTAL IRON BINDING CAPACITY: 419 UG/DL (ref 250–450)
TRANSFERRIN SERPL-MCNC: 283 MG/DL (ref 200–375)
TRIGL SERPL-MCNC: 142 MG/DL (ref 30–150)
TSH SERPL DL<=0.005 MIU/L-ACNC: 2.26 UIU/ML (ref 0.4–4)
WBC # BLD AUTO: 7.87 K/UL (ref 3.9–12.7)

## 2022-11-03 PROCEDURE — 80061 LIPID PANEL: CPT | Performed by: FAMILY MEDICINE

## 2022-11-03 PROCEDURE — 85025 COMPLETE CBC W/AUTO DIFF WBC: CPT | Performed by: FAMILY MEDICINE

## 2022-11-03 PROCEDURE — 84466 ASSAY OF TRANSFERRIN: CPT | Performed by: FAMILY MEDICINE

## 2022-11-03 PROCEDURE — 80053 COMPREHEN METABOLIC PANEL: CPT | Performed by: FAMILY MEDICINE

## 2022-11-03 PROCEDURE — 84443 ASSAY THYROID STIM HORMONE: CPT | Performed by: FAMILY MEDICINE

## 2022-11-03 PROCEDURE — 36415 COLL VENOUS BLD VENIPUNCTURE: CPT | Performed by: FAMILY MEDICINE

## 2022-11-03 PROCEDURE — 83036 HEMOGLOBIN GLYCOSYLATED A1C: CPT | Performed by: FAMILY MEDICINE

## 2022-11-03 PROCEDURE — 82728 ASSAY OF FERRITIN: CPT | Performed by: FAMILY MEDICINE

## 2022-11-07 ENCOUNTER — TELEPHONE (OUTPATIENT)
Dept: PRIMARY CARE CLINIC | Facility: CLINIC | Age: 62
End: 2022-11-07
Payer: COMMERCIAL

## 2022-11-07 NOTE — TELEPHONE ENCOUNTER
----- Message from Otis Reynolds MD sent at 11/4/2022  7:55 AM CDT -----  I'm covering for Dr. Brown today who is out.  There were a few white blood cells in the urine, but otherwise the urinalysis looked pretty unremarkable.  Were you having urinary symptoms or was this just done as part of annual screening?

## 2022-11-07 NOTE — TELEPHONE ENCOUNTER
----- Message from Viridiana Fox sent at 11/7/2022  3:22 PM CST -----  Contact: [phone]  Patient is returning a phone call.  Who left a message for the patient: Julia Medellin LPN     Does patient know what this is regarding:  yes   Would you like a call back, or a response through your [facility] portal?:   call back  Comments:

## 2022-12-20 ENCOUNTER — PATIENT MESSAGE (OUTPATIENT)
Dept: ADMINISTRATIVE | Facility: OTHER | Age: 62
End: 2022-12-20
Payer: COMMERCIAL

## 2022-12-20 ENCOUNTER — OFFICE VISIT (OUTPATIENT)
Dept: PRIMARY CARE CLINIC | Facility: CLINIC | Age: 62
End: 2022-12-20
Payer: COMMERCIAL

## 2022-12-20 VITALS
TEMPERATURE: 99 F | HEIGHT: 65 IN | OXYGEN SATURATION: 95 % | WEIGHT: 197.06 LBS | BODY MASS INDEX: 32.83 KG/M2 | DIASTOLIC BLOOD PRESSURE: 82 MMHG | SYSTOLIC BLOOD PRESSURE: 140 MMHG | RESPIRATION RATE: 18 BRPM | HEART RATE: 95 BPM

## 2022-12-20 DIAGNOSIS — Z00.00 ANNUAL PHYSICAL EXAM: Primary | ICD-10-CM

## 2022-12-20 DIAGNOSIS — E04.2 MULTINODULAR GOITER (NONTOXIC): ICD-10-CM

## 2022-12-20 DIAGNOSIS — D35.01 ADENOMA OF RIGHT ADRENAL GLAND: ICD-10-CM

## 2022-12-20 DIAGNOSIS — E78.5 HYPERLIPIDEMIA, UNSPECIFIED HYPERLIPIDEMIA TYPE: ICD-10-CM

## 2022-12-20 DIAGNOSIS — R73.03 PREDIABETES: ICD-10-CM

## 2022-12-20 DIAGNOSIS — R09.82 PND (POST-NASAL DRIP): ICD-10-CM

## 2022-12-20 DIAGNOSIS — K21.9 GASTROESOPHAGEAL REFLUX DISEASE, UNSPECIFIED WHETHER ESOPHAGITIS PRESENT: ICD-10-CM

## 2022-12-20 DIAGNOSIS — R09.89 THROAT CLEARING: ICD-10-CM

## 2022-12-20 DIAGNOSIS — Z12.31 SCREENING MAMMOGRAM FOR BREAST CANCER: ICD-10-CM

## 2022-12-20 PROCEDURE — 3077F PR MOST RECENT SYSTOLIC BLOOD PRESSURE >= 140 MM HG: ICD-10-PCS | Mod: CPTII,S$GLB,, | Performed by: FAMILY MEDICINE

## 2022-12-20 PROCEDURE — 3008F BODY MASS INDEX DOCD: CPT | Mod: CPTII,S$GLB,, | Performed by: FAMILY MEDICINE

## 2022-12-20 PROCEDURE — 3077F SYST BP >= 140 MM HG: CPT | Mod: CPTII,S$GLB,, | Performed by: FAMILY MEDICINE

## 2022-12-20 PROCEDURE — 3079F PR MOST RECENT DIASTOLIC BLOOD PRESSURE 80-89 MM HG: ICD-10-PCS | Mod: CPTII,S$GLB,, | Performed by: FAMILY MEDICINE

## 2022-12-20 PROCEDURE — 99999 PR PBB SHADOW E&M-EST. PATIENT-LVL IV: ICD-10-PCS | Mod: PBBFAC,,, | Performed by: FAMILY MEDICINE

## 2022-12-20 PROCEDURE — 99396 PREV VISIT EST AGE 40-64: CPT | Mod: S$GLB,,, | Performed by: FAMILY MEDICINE

## 2022-12-20 PROCEDURE — 3044F PR MOST RECENT HEMOGLOBIN A1C LEVEL <7.0%: ICD-10-PCS | Mod: CPTII,S$GLB,, | Performed by: FAMILY MEDICINE

## 2022-12-20 PROCEDURE — 1159F PR MEDICATION LIST DOCUMENTED IN MEDICAL RECORD: ICD-10-PCS | Mod: CPTII,S$GLB,, | Performed by: FAMILY MEDICINE

## 2022-12-20 PROCEDURE — 99396 PR PREVENTIVE VISIT,EST,40-64: ICD-10-PCS | Mod: S$GLB,,, | Performed by: FAMILY MEDICINE

## 2022-12-20 PROCEDURE — 3044F HG A1C LEVEL LT 7.0%: CPT | Mod: CPTII,S$GLB,, | Performed by: FAMILY MEDICINE

## 2022-12-20 PROCEDURE — 99999 PR PBB SHADOW E&M-EST. PATIENT-LVL IV: CPT | Mod: PBBFAC,,, | Performed by: FAMILY MEDICINE

## 2022-12-20 PROCEDURE — 3008F PR BODY MASS INDEX (BMI) DOCUMENTED: ICD-10-PCS | Mod: CPTII,S$GLB,, | Performed by: FAMILY MEDICINE

## 2022-12-20 PROCEDURE — 3079F DIAST BP 80-89 MM HG: CPT | Mod: CPTII,S$GLB,, | Performed by: FAMILY MEDICINE

## 2022-12-20 PROCEDURE — 1159F MED LIST DOCD IN RCRD: CPT | Mod: CPTII,S$GLB,, | Performed by: FAMILY MEDICINE

## 2022-12-20 RX ORDER — FLUTICASONE PROPIONATE 50 MCG
1 SPRAY, SUSPENSION (ML) NASAL DAILY
Qty: 16 G | Refills: 6 | Status: SHIPPED | OUTPATIENT
Start: 2022-12-20 | End: 2023-12-27 | Stop reason: SDUPTHER

## 2022-12-20 RX ORDER — METFORMIN HYDROCHLORIDE 500 MG/1
500 TABLET, EXTENDED RELEASE ORAL
Qty: 90 TABLET | Refills: 3 | Status: SHIPPED | OUTPATIENT
Start: 2022-12-20 | End: 2023-12-26 | Stop reason: ALTCHOICE

## 2022-12-20 NOTE — PROGRESS NOTES
"Subjective:       Patient ID: Teodora Yuen is a 62 y.o. female.    Chief Complaint: Annual Exam    HPI 63 y/o female with Vit D def, hx of Osteopenia, hlpd, r adrenal mass, multiple thyroid nodules, hx of diverticulitis, hiatal hernia, low ferritin/iron, hx of gastric ulcer, gerd, family hx of ovarian ca is here for annual exam.       She is feeling good, she has has allergic sx since thanksgiving, at night she gets pnd and throat clearing, she denies f/n/v/heartburn/d/constipation/cp/sob/urinary sx. She is walking 6 miles a day. She is eating mostly healthy. Sleeping ok using hydroxyzine prn.       Sunday she punctured her L thumb with deonna nail on potters bench on Sunday, wound is mostly healed, Tdap utd     R wrist fracture: following with ortho, completed OT  Gerd: Dr. Zelaya did EGD 2 years, she has a hiatal hernia, has gastric ulcer in the past,trial of nexium 20 mg was done and she developed breakthrough, nexium 40 mg daily  Vitamin D def: on supplement  Low ferritin/iron: on supplement  Multiple thyroid nodules: s/p lobectomy 11/2021, following with endo  Prediabetes: a1c 6.4 11/2022, off metformin as she ran out  HLPD: pravstatin 40 mg daily  GYN: no recent pelvic but stacy with bso in 1999 secondary to family hx of ovarian cancer and endometriosis, mmg overdue  Eye exam utd at [facility]  Dental utd  R sided adrenal mass: was seen by Urology, CT abd/pelvic 6/2022 stable  Osteopenia: was on Actonel for 6 months in the past, repeat dexa due  Colonoscopy done in 2015 or 2016 Dr. Zelaya; hx of Diverticulitis, cologuard negative     Review of Systems  see HPI  Objective:      BP (!) 142/86 (BP Location: Left arm, Patient Position: Sitting, BP Method: Small (Manual))   Pulse 95   Temp 98.7 °F (37.1 °C) (Oral)   Resp 18   Ht 5' 5" (1.651 m)   Wt 89.4 kg (197 lb 1.5 oz)   SpO2 95%   BMI 32.80 kg/m²   Physical Exam  Vitals and nursing note reviewed.   Constitutional:       Appearance: She is well-developed. "   HENT:      Head: Normocephalic and atraumatic.   Neck:      Thyroid: No thyromegaly.   Cardiovascular:      Rate and Rhythm: Normal rate and regular rhythm.      Heart sounds: Normal heart sounds.   Pulmonary:      Effort: Pulmonary effort is normal. No respiratory distress.      Breath sounds: Normal breath sounds.   Abdominal:      General: Abdomen is flat. Bowel sounds are normal. There is no distension.      Palpations: Abdomen is soft. There is no mass.      Tenderness: There is no abdominal tenderness.   Musculoskeletal:      Cervical back: Normal range of motion and neck supple.      Right lower leg: No edema.      Left lower leg: No edema.   Lymphadenopathy:      Cervical: No cervical adenopathy.   Skin:     General: Skin is warm and dry.   Neurological:      Mental Status: She is alert.       Assessment:       1. Annual physical exam    2. Multinodular goiter (nontoxic)    3. Prediabetes    4. Adenoma of right adrenal gland    5. Hyperlipidemia, unspecified hyperlipidemia type    6. Gastroesophageal reflux disease, unspecified whether esophagitis present    7. PND (post-nasal drip)    8. Throat clearing    9. Screening mammogram for breast cancer        Plan:   Teodora was seen today for annual exam.    Diagnoses and all orders for this visit:    Annual physical exam    Multinodular goiter (nontoxic)  -     Ambulatory referral/consult to Endocrinology; Future    Prediabetes  -     metFORMIN (GLUCOPHAGE-XR) 500 MG ER 24hr tablet; Take 1 tablet (500 mg total) by mouth daily with breakfast.  -     Comprehensive Metabolic Panel; Future  -     Hemoglobin A1C; Future    Adenoma of right adrenal gland  -     Ambulatory referral/consult to Endocrinology; Future    Hyperlipidemia, unspecified hyperlipidemia type    Gastroesophageal reflux disease, unspecified whether esophagitis present    PND (post-nasal drip)  -     fluticasone propionate (FLONASE) 50 mcg/actuation nasal spray; 1 spray (50 mcg total) by Each  Nostril route once daily.    Throat clearing  -     fluticasone propionate (FLONASE) 50 mcg/actuation nasal spray; 1 spray (50 mcg total) by Each Nostril route once daily.    Screening mammogram for breast cancer  -     Mammo Digital Screening Bilat w/ Jim; Future

## 2022-12-21 ENCOUNTER — PATIENT MESSAGE (OUTPATIENT)
Dept: PRIMARY CARE CLINIC | Facility: CLINIC | Age: 62
End: 2022-12-21
Payer: COMMERCIAL

## 2023-01-23 DIAGNOSIS — K21.9 GASTROESOPHAGEAL REFLUX DISEASE WITHOUT ESOPHAGITIS: ICD-10-CM

## 2023-01-23 DIAGNOSIS — Z72.820 POOR SLEEP: ICD-10-CM

## 2023-01-23 RX ORDER — ESOMEPRAZOLE MAGNESIUM 40 MG/1
40 CAPSULE, DELAYED RELEASE ORAL
Qty: 90 CAPSULE | Refills: 3 | Status: SHIPPED | OUTPATIENT
Start: 2023-01-23 | End: 2023-12-26 | Stop reason: SDUPTHER

## 2023-01-23 NOTE — TELEPHONE ENCOUNTER
Refill Decision Note   Teodora Yuen  is requesting a refill authorization.  Brief Assessment and Rationale for Refill:  Approve     Medication Therapy Plan:       Medication Reconciliation Completed: No   Comments:     No Care Gaps recommended.     Note composed:1:09 PM 01/23/2023

## 2023-01-23 NOTE — TELEPHONE ENCOUNTER
No new care gaps identified.  [facility] Embedded Care Gaps. Reference number: [id number]. 1/23/2023   8:18:39 AM CST

## 2023-01-24 RX ORDER — HYDROXYZINE HYDROCHLORIDE 10 MG/1
10 TABLET, FILM COATED ORAL NIGHTLY PRN
Qty: 90 TABLET | Refills: 1 | Status: SHIPPED | OUTPATIENT
Start: 2023-01-24 | End: 2023-10-02 | Stop reason: SDUPTHER

## 2023-02-03 ENCOUNTER — PATIENT MESSAGE (OUTPATIENT)
Dept: PRIMARY CARE CLINIC | Facility: CLINIC | Age: 63
End: 2023-02-03
Payer: COMMERCIAL

## 2023-03-21 ENCOUNTER — CLINICAL SUPPORT (OUTPATIENT)
Dept: OTHER | Facility: CLINIC | Age: 63
End: 2023-03-21
Payer: COMMERCIAL

## 2023-03-21 DIAGNOSIS — Z00.8 ENCOUNTER FOR OTHER GENERAL EXAMINATION: ICD-10-CM

## 2023-03-23 VITALS
DIASTOLIC BLOOD PRESSURE: 80 MMHG | BODY MASS INDEX: 32.49 KG/M2 | HEIGHT: 65 IN | SYSTOLIC BLOOD PRESSURE: 112 MMHG | WEIGHT: 195 LBS

## 2023-03-23 LAB
HDLC SERPL-MCNC: 48 MG/DL
POC CHOLESTEROL, LDL (DOCK): 128 MG/DL
POC CHOLESTEROL, TOTAL: 216 MG/DL
POC GLUCOSE, FASTING: 104 MG/DL (ref 60–110)
TRIGL SERPL-MCNC: 228 MG/DL

## 2023-04-08 ENCOUNTER — IMMUNIZATION (OUTPATIENT)
Dept: INTERNAL MEDICINE | Facility: CLINIC | Age: 63
End: 2023-04-08
Payer: COMMERCIAL

## 2023-04-08 DIAGNOSIS — Z23 NEED FOR VACCINATION: Primary | ICD-10-CM

## 2023-04-08 PROCEDURE — 0124A COVID-19, MRNA, LNP-S, BIVALENT BOOSTER, PF, 30 MCG/0.3 ML DOSE: CPT | Mod: CV19,PBBFAC | Performed by: FAMILY MEDICINE

## 2023-04-08 PROCEDURE — 91312 COVID-19, MRNA, LNP-S, BIVALENT BOOSTER, PF, 30 MCG/0.3 ML DOSE: ICD-10-PCS | Mod: S$GLB,,, | Performed by: FAMILY MEDICINE

## 2023-04-08 PROCEDURE — 91312 COVID-19, MRNA, LNP-S, BIVALENT BOOSTER, PF, 30 MCG/0.3 ML DOSE: CPT | Mod: S$GLB,,, | Performed by: FAMILY MEDICINE

## 2023-04-12 ENCOUNTER — PATIENT OUTREACH (OUTPATIENT)
Dept: ADMINISTRATIVE | Facility: HOSPITAL | Age: 63
End: 2023-04-12
Payer: COMMERCIAL

## 2023-04-19 ENCOUNTER — PATIENT OUTREACH (OUTPATIENT)
Dept: ADMINISTRATIVE | Facility: HOSPITAL | Age: 63
End: 2023-04-19
Payer: COMMERCIAL

## 2023-04-19 NOTE — LETTER
AUTHORIZATION FOR RELEASE OF   CONFIDENTIAL INFORMATION        We are seeing Teodora Yuen, date of birth 1960, in the clinic at [facility]. Tere Brown MD is the patient's PCP. Teodora Yuen has an outstanding lab/procedure at the time we reviewed her chart. In order to help keep her health information updated, she has authorized us to request the following medical record(s):        (  )  MAMMOGRAM                                       x  COLONOSCOPY      (  )  PAP SMEAR                                          (  )  OUTSIDE LAB RESULTS     (  )  DEXA SCAN                                          (  )  EYE EXAM            (  )  FOOT EXAM                                          (  )  ENTIRE RECORD     (  )  OUTSIDE IMMUNIZATIONS                 (  )  _______________         Please fax records to [facility], Rebecca Jones, MD, [phone]     If you have any questions, please contact [facility] at [phone].           Patient Name: Teodora Yuen  : 1960  Patient Phone #: [phone]

## 2023-08-01 ENCOUNTER — PATIENT MESSAGE (OUTPATIENT)
Dept: ADMINISTRATIVE | Facility: OTHER | Age: 63
End: 2023-08-01
Payer: COMMERCIAL

## 2023-08-01 ENCOUNTER — OFFICE VISIT (OUTPATIENT)
Dept: URGENT CARE | Facility: CLINIC | Age: 63
End: 2023-08-01
Payer: COMMERCIAL

## 2023-08-01 VITALS
DIASTOLIC BLOOD PRESSURE: 76 MMHG | OXYGEN SATURATION: 98 % | SYSTOLIC BLOOD PRESSURE: 130 MMHG | HEIGHT: 65 IN | RESPIRATION RATE: 16 BRPM | TEMPERATURE: 99 F | BODY MASS INDEX: 32.51 KG/M2 | HEART RATE: 92 BPM | WEIGHT: 195.13 LBS

## 2023-08-01 DIAGNOSIS — B34.9 VIRAL SYNDROME: Primary | ICD-10-CM

## 2023-08-01 LAB
CTP QC/QA: YES
SARS-COV-2 AG RESP QL IA.RAPID: NEGATIVE

## 2023-08-01 PROCEDURE — 87811 SARS-COV-2 COVID19 W/OPTIC: CPT | Mod: QW,S$GLB,, | Performed by: FAMILY MEDICINE

## 2023-08-01 PROCEDURE — 87811 SARS CORONAVIRUS 2 ANTIGEN POCT, MANUAL READ: ICD-10-PCS | Mod: QW,S$GLB,, | Performed by: FAMILY MEDICINE

## 2023-08-01 PROCEDURE — 99213 PR OFFICE/OUTPT VISIT, EST, LEVL III, 20-29 MIN: ICD-10-PCS | Mod: S$GLB,,, | Performed by: FAMILY MEDICINE

## 2023-08-01 PROCEDURE — 99213 OFFICE O/P EST LOW 20 MIN: CPT | Mod: S$GLB,,, | Performed by: FAMILY MEDICINE

## 2023-08-01 RX ORDER — ONDANSETRON HYDROCHLORIDE 8 MG/1
8 TABLET, FILM COATED ORAL EVERY 8 HOURS PRN
Qty: 12 TABLET | Refills: 0 | Status: SHIPPED | OUTPATIENT
Start: 2023-08-01 | End: 2023-11-07 | Stop reason: ALTCHOICE

## 2023-08-01 NOTE — PROGRESS NOTES
"Subjective:      Patient ID: Toedora Yuen is a 63 y.o. female.    Vitals:  height is 5' 5" (1.651 m) and weight is 88.5 kg (195 lb 1.7 oz). Her tympanic temperature is 98.9 °F (37.2 °C). Her blood pressure is 130/76 and her pulse is 92. Her respiration is 16 and oxygen saturation is 98%.     Chief Complaint: Nausea    Pt reports that she has been having headaches, chills, body aches, and fatigue. Pt reports that she has been vomiting. Pt reports that she has been taking night quil     Nausea  This is a new problem. The current episode started in the past 7 days (Sunday night). The problem has been unchanged. Associated symptoms include chills, fatigue, headaches, myalgias, nausea and vomiting. Pertinent negatives include no abdominal pain, anorexia, arthralgias, change in bowel habit, chest pain, congestion, coughing, diaphoresis, fever, joint swelling, neck pain, numbness, rash, sore throat, swollen glands, urinary symptoms, vertigo, visual change or weakness. Treatments tried: night quil. The treatment provided mild relief.       Constitution: Positive for chills and fatigue. Negative for sweating and fever.   HENT:  Negative for congestion and sore throat.    Neck: Negative for neck pain.   Cardiovascular:  Negative for chest pain.   Respiratory:  Negative for cough.    Gastrointestinal:  Positive for nausea and vomiting. Negative for abdominal pain.   Musculoskeletal:  Positive for muscle ache. Negative for joint pain and joint swelling.   Skin:  Negative for rash.   Neurological:  Positive for headaches. Negative for history of vertigo and numbness.      Objective:     Physical Exam   Constitutional: She is oriented to person, place, and time.   HENT:   Head: Normocephalic.   Ears:   Right Ear: External ear normal.   Left Ear: External ear normal.   Nose: Nose normal.   Mouth/Throat: Mucous membranes are moist.   Eyes: Conjunctivae are normal.   Cardiovascular: Normal rate.   Pulmonary/Chest: Effort normal. "   Musculoskeletal: Normal range of motion.         General: Normal range of motion.   Neurological: She is alert and oriented to person, place, and time.   Skin: Skin is dry.   Psychiatric: Her behavior is normal.       Assessment:     1. Viral syndrome      Results for orders placed or performed in visit on 08/01/23   SARS Coronavirus 2 Antigen, POCT Manual Read   Result Value Ref Range    SARS Coronavirus 2 Antigen Negative Negative     Acceptable Yes        Plan:       Viral syndrome  -     SARS Coronavirus 2 Antigen, POCT Manual Read  -     ondansetron (ZOFRAN) 8 MG tablet; Take 1 tablet (8 mg total) by mouth every 8 (eight) hours as needed for Nausea.  Dispense: 12 tablet; Refill: 0      RTC as needed.

## 2023-08-01 NOTE — LETTER
[address] [place] [address] ? [place], [address] ? Phone [phone] ? Fax [phone]           Return to Work/School    Patient: Teodora Yuen  YOB: 1960   Date: 08/01/2023      To Whom It May Concern:     Teodora Yuen was in contact with/seen in my office on 08/01/2023. COVID-19 is present in our communities across the state. Not all patients are eligible or appropriate to be tested. In this situation, your employee meets the following criteria:     Teodora Yuen has met the criteria for COVID-19 testing and has a NEGATIVE result. The employee can return to work once they are asymptomatic for 24 hours without the use of fever reducing medications (Tylenol, Motrin, etc).     If you have any questions or concerns, or if I can be of further assistance, please do not hesitate to contact me.     Sincerely,    DONTA JEFFREY MD

## 2023-09-16 ENCOUNTER — IMMUNIZATION (OUTPATIENT)
Dept: INTERNAL MEDICINE | Facility: CLINIC | Age: 63
End: 2023-09-16
Payer: COMMERCIAL

## 2023-09-16 PROCEDURE — 90471 IMMUNIZATION ADMIN: CPT | Mod: S$GLB,,, | Performed by: INTERNAL MEDICINE

## 2023-09-16 PROCEDURE — 90686 IIV4 VACC NO PRSV 0.5 ML IM: CPT | Mod: S$GLB,,, | Performed by: INTERNAL MEDICINE

## 2023-09-16 PROCEDURE — 90686 FLU VACCINE (QUAD) GREATER THAN OR EQUAL TO 3YO PRESERVATIVE FREE IM: ICD-10-PCS | Mod: S$GLB,,, | Performed by: INTERNAL MEDICINE

## 2023-09-16 PROCEDURE — 90471 FLU VACCINE (QUAD) GREATER THAN OR EQUAL TO 3YO PRESERVATIVE FREE IM: ICD-10-PCS | Mod: S$GLB,,, | Performed by: INTERNAL MEDICINE

## 2023-10-02 DIAGNOSIS — Z72.820 POOR SLEEP: ICD-10-CM

## 2023-10-02 RX ORDER — HYDROXYZINE HYDROCHLORIDE 10 MG/1
10 TABLET, FILM COATED ORAL NIGHTLY PRN
Qty: 90 TABLET | Refills: 1 | Status: SHIPPED | OUTPATIENT
Start: 2023-10-02 | End: 2024-02-18 | Stop reason: SDUPTHER

## 2023-11-02 ENCOUNTER — OFFICE VISIT (OUTPATIENT)
Dept: URGENT CARE | Facility: CLINIC | Age: 63
End: 2023-11-02
Payer: COMMERCIAL

## 2023-11-02 VITALS
HEIGHT: 65 IN | BODY MASS INDEX: 32.51 KG/M2 | HEART RATE: 87 BPM | WEIGHT: 195.13 LBS | DIASTOLIC BLOOD PRESSURE: 72 MMHG | SYSTOLIC BLOOD PRESSURE: 138 MMHG | TEMPERATURE: 98 F | RESPIRATION RATE: 18 BRPM | OXYGEN SATURATION: 98 %

## 2023-11-02 DIAGNOSIS — L03.311 CELLULITIS OF ABDOMINAL WALL: ICD-10-CM

## 2023-11-02 DIAGNOSIS — R23.8 VESICULAR RASH: ICD-10-CM

## 2023-11-02 DIAGNOSIS — R52 PAIN: ICD-10-CM

## 2023-11-02 DIAGNOSIS — R21 RASH: Primary | ICD-10-CM

## 2023-11-02 PROCEDURE — 96372 THER/PROPH/DIAG INJ SC/IM: CPT | Mod: S$GLB,,,

## 2023-11-02 PROCEDURE — 96372 PR INJECTION,THERAP/PROPH/DIAG2ST, IM OR SUBCUT: ICD-10-PCS | Mod: S$GLB,,,

## 2023-11-02 PROCEDURE — 99213 PR OFFICE/OUTPT VISIT, EST, LEVL III, 20-29 MIN: ICD-10-PCS | Mod: 25,S$GLB,,

## 2023-11-02 PROCEDURE — 87798 DETECT AGENT NOS DNA AMP: CPT

## 2023-11-02 PROCEDURE — 99213 OFFICE O/P EST LOW 20 MIN: CPT | Mod: 25,S$GLB,,

## 2023-11-02 RX ORDER — SULFAMETHOXAZOLE AND TRIMETHOPRIM 800; 160 MG/1; MG/1
1 TABLET ORAL 2 TIMES DAILY
Qty: 14 TABLET | Refills: 0 | Status: SHIPPED | OUTPATIENT
Start: 2023-11-02 | End: 2023-11-09

## 2023-11-02 RX ORDER — IBUPROFEN 800 MG/1
800 TABLET ORAL 3 TIMES DAILY PRN
Qty: 20 TABLET | Refills: 0 | Status: SHIPPED | OUTPATIENT
Start: 2023-11-02

## 2023-11-02 RX ORDER — TRIAMCINOLONE ACETONIDE 1 MG/G
CREAM TOPICAL 2 TIMES DAILY
Qty: 45 G | Refills: 0 | Status: SHIPPED | OUTPATIENT
Start: 2023-11-02 | End: 2023-11-06

## 2023-11-02 RX ORDER — DEXAMETHASONE SODIUM PHOSPHATE 100 MG/10ML
10 INJECTION INTRAMUSCULAR; INTRAVENOUS ONCE
Status: COMPLETED | OUTPATIENT
Start: 2023-11-02 | End: 2023-11-02

## 2023-11-02 RX ORDER — VALACYCLOVIR HYDROCHLORIDE 1 G/1
1000 TABLET, FILM COATED ORAL 3 TIMES DAILY
Qty: 21 TABLET | Refills: 0 | Status: SHIPPED | OUTPATIENT
Start: 2023-11-02 | End: 2023-11-07 | Stop reason: SDUPTHER

## 2023-11-02 RX ADMIN — DEXAMETHASONE SODIUM PHOSPHATE 10 MG: 100 INJECTION INTRAMUSCULAR; INTRAVENOUS at 05:11

## 2023-11-02 NOTE — PATIENT INSTRUCTIONS
Take Valtrex 3 times a day for 7 days. Take Bactrim twice a day for 7 days. Apply cream twice daily to rash. Take ibuprofen 3 times a day as needed for pain. We will call you bear few days regarding your swab. Wash with mild soap and water. Use emollients and mild lotion such as Cerave for irritation.       What care is needed at home?   Call your regular doctor to let them know you were in the ED. Make a follow-up appointment if you were told to.  Use an unscented cream or lotion to keep your skin moist.  Drink plenty of fluids to keep your body hydrated.  Bathe with cool or warm water. Do not use hot water. Pat yourself dry with a clean, thick, soft towel. Use mild and unscented soap, moisturizers, and deodorants.  At-home care to help with scratching:  Wear gloves to protect skin on your hands. Try wearing cotton gloves under plastic gloves. Remove both sets of gloves from time to time to prevent sweating.  Keep nails short and clean.  If you scratch in your sleep, wear white cotton gloves to bed.  Try using cool compresses on the skin. They may help with swelling and itching. Dip a cloth in cold water and put it right on your itchy skin.  When do I need to get emergency help?   Call for an ambulance right away if:   You start to have severe trouble breathing or swallowing (for example, you cannot speak in full sentences).  Return to the ED if:   The rash spreads over large parts of your body and most of your skin becomes red.  It is becoming hard to breathe, but you can still talk in full sentences.  When do I need to call the doctor?   You have a fever of 100.4°F (38°C) or higher or chills.  You have signs of a wound infection like swelling, redness, warmth, pain, or drainage from the wound.  You have new or worsening symptoms.  - Rest.    - Drink plenty of fluids.    - Acetaminophen (tylenol) or Ibuprofen (advil,motrin) as directed as needed for fever/pain. Avoid tylenol if you have a history of liver disease.  Do not take ibuprofen if you have a history of GI bleeding, kidney disease, or if you take blood thinners.     - Follow up with your PCP or specialty clinic as directed in the next 1-2 weeks if not improved or as needed.  You can call [phone] to schedule an appointment with the appropriate provider.    - Go to the ER or seek medical attention immediately if you develop new or worsening symptoms.     - You must understand that you have received an Urgent Care treatment only and that you may be released before all of your medical problems are known or treated.   - You, the patient, will arrange for follow up care as instructed.   - If your condition worsens or fails to improve we recommend that you receive another evaluation at the ER immediately or contact your PCP to discuss your concerns or return here.

## 2023-11-02 NOTE — PROGRESS NOTES
"Subjective:      Patient ID: Teodora Yuen is a 63 y.o. female.    Vitals:  height is 5' 5" (1.651 m) and weight is 88.5 kg (195 lb 1.7 oz). Her oral temperature is 98 °F (36.7 °C). Her blood pressure is 138/72 and her pulse is 87. Her respiration is 18 and oxygen saturation is 98%.     Chief Complaint: Rash    63 yr old female presents with small-fluid filled vesicle rash with surrounding erythema the last 4 days that is painful. Patient describe pain as achy and dull. Patient states rash has spread after she was spray painting and may had gotten spray paint on her skin. Patient states she took OTC antibiotic cream, hydrocortisone, and applying lotion with no relief. Patient denies any itching. Patient states she has been stress lately since changing jobs and may have contributed to her "rash breakout."  Patient denies fever or any other associated symptoms. Denies any new soaps or garments that may have irritated skin.         Rash  This is a new problem. Episode onset: sunday. The problem has been gradually worsening since onset. The affected locations include the chest and back. The rash is characterized by blistering, redness, swelling and pain. It is unknown if there was an exposure to a precipitant. Pertinent negatives include no anorexia, congestion, cough, diarrhea, eye pain, facial edema, fatigue, fever, joint pain, nail changes, rhinorrhea, shortness of breath, sore throat or vomiting. Past treatments include antibiotic cream, topical steroids and anti-itch cream (Aveeno). The treatment provided no relief. There is no history of allergies.       Constitution: Negative for activity change, appetite change, chills, sweating, fatigue and fever.   HENT:  Negative for ear pain, ear discharge, foreign body in ear, tinnitus, hearing loss, congestion and sore throat.    Neck: Negative for neck pain, neck stiffness, painful lymph nodes, neck swelling and degenerative disc disease.   Cardiovascular:  Negative for " chest trauma, chest pain, leg swelling, palpitations and sob on exertion.   Eyes:  Negative for eye trauma, foreign body in eye, eye discharge, eye itching, eye pain and eye redness.   Respiratory:  Negative for sleep apnea, chest tightness, cough, sputum production, bloody sputum, COPD, shortness of breath and asthma.    Gastrointestinal:  Negative for abdominal trauma, abdominal pain, abdominal bloating, history of abdominal surgery, nausea, vomiting, constipation and diarrhea.   Endocrine: cold intolerance, heat intolerance and excessive thirst.   Genitourinary:  Negative for dysuria, frequency, urgency, urine decreased, flank pain and bladder incontinence.   Musculoskeletal:  Negative for pain, trauma, joint pain, joint swelling, abnormal ROM of joint, arthritis and gout.   Skin:  Positive for rash and erythema. Negative for color change, pale, wound, abrasion, laceration and lesion.   Allergic/Immunologic: Negative for seasonal allergies, food allergies, eczema, asthma and immunocompromised state.   Neurological:  Negative for dizziness, history of vertigo, light-headedness, passing out, facial drooping, disorientation and altered mental status.   Hematologic/Lymphatic: Negative for swollen lymph nodes, easy bruising/bleeding, trouble clotting and history of blood clots. Does not bruise/bleed easily.   Psychiatric/Behavioral:  Negative for altered mental status, disorientation, confusion, agitation and nervous/anxious. The patient is not nervous/anxious.       Objective:     Physical Exam   Constitutional: She is oriented to person, place, and time. She appears well-developed.   HENT:   Head: Normocephalic and atraumatic. Head is without abrasion, without contusion and without laceration.   Ears:   Right Ear: External ear normal.   Left Ear: External ear normal.   Nose: Nose normal.   Mouth/Throat: Oropharynx is clear and moist and mucous membranes are normal.   Eyes: Conjunctivae, EOM and lids are normal.  Pupils are equal, round, and reactive to light.   Neck: Trachea normal and phonation normal. Neck supple.   Cardiovascular: Normal rate, regular rhythm and normal heart sounds.   Pulmonary/Chest: Effort normal and breath sounds normal. No stridor. No respiratory distress.   Musculoskeletal: Normal range of motion.         General: Normal range of motion.   Neurological: She is alert and oriented to person, place, and time.   Skin: Skin is warm, dry, intact, not pale and rash. Capillary refill takes less than 2 seconds. erythema and lesion No abrasion, No burn, No bruising and No ecchymosis         Comments: Small vesicular lesions with erythema over right abdominal wall and right mid back jaundice  Psychiatric: Her speech is normal and behavior is normal. Judgment and thought content normal.   Nursing note and vitals reviewed.         Assessment:     1. Rash    2. Cellulitis of abdominal wall    3. Pain    4. Vesicular rash        Plan:       Rash  -     Cancel: HSV by Rapid PCR, Non-Blood [facility]; Skin  -     dexAMETHasone injection 10 mg  -     Varicella Zoster By Rapid Pcr Tissue  -     valACYclovir (VALTREX) 1000 MG tablet; Take 1 tablet (1,000 mg total) by mouth 3 (three) times daily. for 7 days  Dispense: 21 tablet; Refill: 0  -     triamcinolone acetonide 0.1% (KENALOG) 0.1 % cream; Apply topically 2 (two) times daily. Apply to rash  Dispense: 45 g; Refill: 0    Cellulitis of abdominal wall  -     sulfamethoxazole-trimethoprim 800-160mg (BACTRIM DS) 800-160 mg Tab; Take 1 tablet by mouth 2 (two) times daily. for 7 days  Dispense: 14 tablet; Refill: 0    Pain  -     ibuprofen (ADVIL,MOTRIN) 800 MG tablet; Take 1 tablet (800 mg total) by mouth 3 (three) times daily as needed for Pain.  Dispense: 20 tablet; Refill: 0    Vesicular rash          Medical Decision Making:   Urgent Care Management:  Small clear fluid-filled vesicular lesions with erythema over right abdominal wall and right mid back. Rash is  shingle-like in appearance, will treat with valtrex and kenalog. Vesicular fluid swabbed. Patient given decadron IM in clinic for inflammation. Will also treat with Bactrim abx to cover suspicion of skin infection or developing cellulitis. Patient instructed to continue to monitor symptoms and if worsens to follow up. Patient verbalize understanding.     Additional MDM:     Heart Failure Score:   COPD = No

## 2023-11-03 ENCOUNTER — PATIENT MESSAGE (OUTPATIENT)
Dept: URGENT CARE | Facility: CLINIC | Age: 63
End: 2023-11-03
Payer: COMMERCIAL

## 2023-11-05 ENCOUNTER — PATIENT MESSAGE (OUTPATIENT)
Dept: PRIMARY CARE CLINIC | Facility: CLINIC | Age: 63
End: 2023-11-05
Payer: COMMERCIAL

## 2023-11-05 DIAGNOSIS — L30.8 PRURITIC DERMATITIS: Primary | ICD-10-CM

## 2023-11-05 DIAGNOSIS — B02.8 HERPES ZOSTER WITH COMPLICATION: ICD-10-CM

## 2023-11-06 RX ORDER — TRIAMCINOLONE ACETONIDE 1 MG/G
OINTMENT TOPICAL 2 TIMES DAILY
Qty: 80 G | Refills: 0 | Status: SHIPPED | OUTPATIENT
Start: 2023-11-06 | End: 2023-11-21 | Stop reason: SDUPTHER

## 2023-11-06 RX ORDER — GABAPENTIN 100 MG/1
100 CAPSULE ORAL 3 TIMES DAILY
Qty: 90 CAPSULE | Refills: 0 | Status: SHIPPED | OUTPATIENT
Start: 2023-11-06 | End: 2023-11-15

## 2023-11-06 NOTE — TELEPHONE ENCOUNTER
Please review my chart message   Pt went to urgent care on 11/2  States that rash is still very itchy and painful   Pt would like you to review pictures from urgent care for alternatives.      Should I schedule her with an appointment?

## 2023-11-06 NOTE — TELEPHONE ENCOUNTER
Looks like she was given the Valtrex, she should have started immediately, if she has not then start now and she should complete the 7 day course, looks like shingles based on pics but I cannot tell location to be sure, please set her up for virtual with me. I can send in gabapentin for her try for the pain. I will send in an ointment as well to use topically. Lets get her on the books with derm in the meantime as well.

## 2023-11-07 ENCOUNTER — OFFICE VISIT (OUTPATIENT)
Dept: PRIMARY CARE CLINIC | Facility: CLINIC | Age: 63
End: 2023-11-07
Payer: COMMERCIAL

## 2023-11-07 ENCOUNTER — TELEPHONE (OUTPATIENT)
Dept: PRIMARY CARE CLINIC | Facility: CLINIC | Age: 63
End: 2023-11-07

## 2023-11-07 VITALS — BODY MASS INDEX: 30.79 KG/M2 | WEIGHT: 185 LBS

## 2023-11-07 DIAGNOSIS — Z51.81 ENCOUNTER FOR MONITORING LONG-TERM PROTON PUMP INHIBITOR THERAPY: ICD-10-CM

## 2023-11-07 DIAGNOSIS — R73.03 PREDIABETES: ICD-10-CM

## 2023-11-07 DIAGNOSIS — B02.8 HERPES ZOSTER WITH COMPLICATION: Primary | ICD-10-CM

## 2023-11-07 DIAGNOSIS — R21 RASH: ICD-10-CM

## 2023-11-07 DIAGNOSIS — E78.5 HYPERLIPIDEMIA, UNSPECIFIED HYPERLIPIDEMIA TYPE: ICD-10-CM

## 2023-11-07 DIAGNOSIS — Z13.220 ENCOUNTER FOR LIPID SCREENING FOR CARDIOVASCULAR DISEASE: ICD-10-CM

## 2023-11-07 DIAGNOSIS — E04.2 MULTINODULAR GOITER (NONTOXIC): ICD-10-CM

## 2023-11-07 DIAGNOSIS — Z13.6 ENCOUNTER FOR LIPID SCREENING FOR CARDIOVASCULAR DISEASE: ICD-10-CM

## 2023-11-07 DIAGNOSIS — K21.9 GASTROESOPHAGEAL REFLUX DISEASE, UNSPECIFIED WHETHER ESOPHAGITIS PRESENT: ICD-10-CM

## 2023-11-07 DIAGNOSIS — Z00.00 ANNUAL PHYSICAL EXAM: ICD-10-CM

## 2023-11-07 DIAGNOSIS — Z79.899 ENCOUNTER FOR MONITORING LONG-TERM PROTON PUMP INHIBITOR THERAPY: ICD-10-CM

## 2023-11-07 LAB
SPECIMEN SOURCE: ABNORMAL
VARICELLA ZOSTER BY PCR RESULT: POSITIVE

## 2023-11-07 PROCEDURE — 1159F PR MEDICATION LIST DOCUMENTED IN MEDICAL RECORD: ICD-10-PCS | Mod: CPTII,95,, | Performed by: FAMILY MEDICINE

## 2023-11-07 PROCEDURE — 3008F BODY MASS INDEX DOCD: CPT | Mod: CPTII,95,, | Performed by: FAMILY MEDICINE

## 2023-11-07 PROCEDURE — 1159F MED LIST DOCD IN RCRD: CPT | Mod: CPTII,95,, | Performed by: FAMILY MEDICINE

## 2023-11-07 PROCEDURE — 3008F PR BODY MASS INDEX (BMI) DOCUMENTED: ICD-10-PCS | Mod: CPTII,95,, | Performed by: FAMILY MEDICINE

## 2023-11-07 PROCEDURE — 99214 PR OFFICE/OUTPT VISIT, EST, LEVL IV, 30-39 MIN: ICD-10-PCS | Mod: 95,,, | Performed by: FAMILY MEDICINE

## 2023-11-07 PROCEDURE — 99214 OFFICE O/P EST MOD 30 MIN: CPT | Mod: 95,,, | Performed by: FAMILY MEDICINE

## 2023-11-07 RX ORDER — VALACYCLOVIR HYDROCHLORIDE 1 G/1
1000 TABLET, FILM COATED ORAL 3 TIMES DAILY
Qty: 21 TABLET | Refills: 0 | Status: SHIPPED | OUTPATIENT
Start: 2023-11-07 | End: 2023-12-26 | Stop reason: ALTCHOICE

## 2023-11-07 NOTE — PROGRESS NOTES
Subjective:       Patient ID: Teodora Yuen is a 63 y.o. female.    Chief Complaint: Herpes Zoster (Hospital follow up for possible shingles /States that she have been taking the medication that was prescribed to her but she is not getting any relief. Itching and painful. )    HPI  64 y/o female with Vit D def, hx of Osteopenia, hlpd, r adrenal mass, multiple thyroid nodules, hx of diverticulitis, hiatal hernia, low ferritin/iron, hx of gastric ulcer, gerd, family hx of ovarian ca is here for follow rash.    She started with rash on 10/29/23. She was seen in UC on 11/2, she was started on Bactrim for cellulitis and Valtrex for shingles, she has had increased stress as she has left [employer] and is starting a new job, she also had her Covid booster about a week prior. Prior to going to UC she was using Aloe and antibiotic topicals that did not help. She has been on Valtrex TID, Bactrim and topical cream without relief of the pain. The pain is burning intense pain, she also has itching in the area. She has not tried hydroxyzine or other antihistamines. She is feeling ok otherwise, she denies f, she did having some nausea with the antibiotic, she denies d/constipation/cp/sob/urinary sx. She is not sleeping well.       R wrist fracture: following with ortho, completed OT  Gerd: Dr. Zelaya did EGD 2 years, she has a hiatal hernia, has gastric ulcer in the past,trial of nexium 20 mg was done and she developed breakthrough, nexium 40 mg daily  Vitamin D def: on supplement  Low ferritin/iron: on supplement  Multiple thyroid nodules: s/p lobectomy 11/2021, following with endo  Prediabetes: a1c 6.4 11/2022, off metformin as she ran out  HLPD: pravstatin 40 mg daily  GYN: no recent pelvic but stacy with bso in 1999 secondary to family hx of ovarian cancer and endometriosis, mmg overdue  Eye exam utd at [facility]  Dental utd  R sided adrenal mass: was seen by Urology, CT abd/pelvic 6/2022 stable  Osteopenia: was on Actonel for 6  months in the past, repeat dexa due  Colonoscopy done in 2015 or 2016 Dr. Zelaya; hx of Diverticulitis, cologuard negative     Review of Systems   Constitutional:  Negative for activity change and unexpected weight change.   HENT:  Negative for hearing loss, rhinorrhea and trouble swallowing.    Eyes:  Negative for discharge and visual disturbance.   Respiratory:  Negative for chest tightness and wheezing.    Cardiovascular:  Negative for chest pain and palpitations.   Gastrointestinal:  Negative for blood in stool, constipation, diarrhea and vomiting.   Endocrine: Negative for polydipsia and polyuria.   Genitourinary:  Negative for difficulty urinating, dysuria, hematuria and menstrual problem.   Musculoskeletal:  Negative for arthralgias, joint swelling and neck pain.   Neurological:  Negative for weakness and headaches.   Psychiatric/Behavioral:  Negative for confusion and dysphoric mood.        Objective:      Wt 83.9 kg (185 lb)   BMI 30.79 kg/m²   Physical Exam  Constitutional:       General: She is not in acute distress.     Appearance: She is well-developed. She is not diaphoretic.   HENT:      Head: Normocephalic and atraumatic.   Pulmonary:      Effort: No respiratory distress.   Neurological:      Mental Status: She is alert and oriented to person, place, and time.         Assessment:       1. Herpes zoster with complication    2. Rash    3. Annual physical exam    4. Prediabetes    5. Multinodular goiter (nontoxic)    6. Gastroesophageal reflux disease, unspecified whether esophagitis present    7. Encounter for monitoring long-term proton pump inhibitor therapy    8. Encounter for lipid screening for cardiovascular disease    9. Hyperlipidemia, unspecified hyperlipidemia type        Plan:   Teodora was seen today for herpes zoster.    Diagnoses and all orders for this visit:    Herpes zoster with complication  -     valACYclovir (VALTREX) 1000 MG tablet; Take 1 tablet (1,000 mg total) by mouth 3 (three)  times daily. for 7 days    Rash    Annual physical exam  -     CBC Auto Differential; Future  -     Comprehensive Metabolic Panel; Future  -     Hemoglobin A1C; Future  -     Lipid Panel; Future  -     TSH; Future  -     Vitamin B12; Future  -     Magnesium; Future    Prediabetes  -     CBC Auto Differential; Future  -     Lipid Panel; Future    Multinodular goiter (nontoxic)  -     TSH; Future    Gastroesophageal reflux disease, unspecified whether esophagitis present  -     CBC Auto Differential; Future  -     Vitamin B12; Future  -     Magnesium; Future    Encounter for monitoring long-term proton pump inhibitor therapy  -     CBC Auto Differential; Future  -     Vitamin B12; Future  -     Magnesium; Future    Encounter for lipid screening for cardiovascular disease  -     Lipid Panel; Future    Hyperlipidemia, unspecified hyperlipidemia type  -     Comprehensive Metabolic Panel; Future  -     Lipid Panel; Future    The patient location is: [place]  The chief complaint leading to consultation is: zoster    Visit type: audiovisual    Face to Face time with patient: 20 minutes of total time spent on the encounter, which includes face to face time and non-face to face time preparing to see the patient (eg, review of tests), Obtaining and/or reviewing separately obtained history, Documenting clinical information in the electronic or other health record, Independently interpreting results (not separately reported) and communicating results to the patient/family/caregiver, or Care coordination (not separately reported).         Each patient to whom he or she provides medical services by telemedicine is:  (1) informed of the relationship between the physician and patient and the respective role of any other health care provider with respect to management of the patient; and (2) notified that he or she may decline to receive medical services by telemedicine and may withdraw from such care at any time.    Notes:

## 2023-11-07 NOTE — PATIENT INSTRUCTIONS
Start with 100 mg of gabapentin at night, then if ok and not too drowsy can start taking gabapentin 100 mg in the morning and again at lunch. Then you can increase the dose to 200 mg at night and if ok and not too drowsy can take 200 mg in the morning and at lunch then you can increase to 300 mg at night and so on.    Let me know how you are doing with the pain on Thursday

## 2023-11-08 ENCOUNTER — PATIENT MESSAGE (OUTPATIENT)
Dept: PRIMARY CARE CLINIC | Facility: CLINIC | Age: 63
End: 2023-11-08
Payer: COMMERCIAL

## 2023-11-10 ENCOUNTER — PATIENT MESSAGE (OUTPATIENT)
Dept: PRIMARY CARE CLINIC | Facility: CLINIC | Age: 63
End: 2023-11-10
Payer: COMMERCIAL

## 2023-11-10 DIAGNOSIS — L30.8 PRURITIC DERMATITIS: ICD-10-CM

## 2023-11-10 DIAGNOSIS — B02.8 HERPES ZOSTER WITH COMPLICATION: Primary | ICD-10-CM

## 2023-11-14 RX ORDER — GABAPENTIN 400 MG/1
400 CAPSULE ORAL 3 TIMES DAILY
Qty: 90 CAPSULE | Refills: 11 | Status: CANCELLED | OUTPATIENT
Start: 2023-11-14 | End: 2024-11-13

## 2023-11-14 NOTE — TELEPHONE ENCOUNTER
Care Due:                  Date            Visit Type   Department     Provider  --------------------------------------------------------------------------------                                ESTABLISHED                              PATIENT -    [facility]  Last Visit: 11-      VIRTUAL      Care           Tere Brown                              EP -                              PRIMARY      [facility]  Next Visit: 12-      CARE ([facility])   Care           Tere Brown                                                            Last  Test          Frequency    Reason                     Performed    Due Date  --------------------------------------------------------------------------------    CBC.........  12 months..  valACYclovir.............  11-   10-    CMP.........  12 months..  metFORMIN, pravastatin,    11-   10-                             valACYclovir.............    HBA1C.......  6 months...  metFORMIN................  11- 05-    [facility] Embedded Care Due Messages. Reference number: [id number].   11/14/2023 8:52:59 AM CST

## 2023-11-15 RX ORDER — GABAPENTIN 600 MG/1
600 TABLET ORAL 3 TIMES DAILY
Qty: 90 TABLET | Refills: 3 | Status: SHIPPED | OUTPATIENT
Start: 2023-11-15 | End: 2023-12-26 | Stop reason: ALTCHOICE

## 2023-11-21 RX ORDER — TRIAMCINOLONE ACETONIDE 1 MG/G
OINTMENT TOPICAL 2 TIMES DAILY
Qty: 80 G | Refills: 0 | Status: SHIPPED | OUTPATIENT
Start: 2023-11-21

## 2023-12-16 ENCOUNTER — LAB VISIT (OUTPATIENT)
Dept: LAB | Facility: HOSPITAL | Age: 63
End: 2023-12-16
Payer: COMMERCIAL

## 2023-12-16 DIAGNOSIS — K21.9 GASTROESOPHAGEAL REFLUX DISEASE, UNSPECIFIED WHETHER ESOPHAGITIS PRESENT: ICD-10-CM

## 2023-12-16 DIAGNOSIS — E78.5 HYPERLIPIDEMIA, UNSPECIFIED HYPERLIPIDEMIA TYPE: ICD-10-CM

## 2023-12-16 DIAGNOSIS — Z79.899 ENCOUNTER FOR MONITORING LONG-TERM PROTON PUMP INHIBITOR THERAPY: ICD-10-CM

## 2023-12-16 DIAGNOSIS — Z13.220 ENCOUNTER FOR LIPID SCREENING FOR CARDIOVASCULAR DISEASE: ICD-10-CM

## 2023-12-16 DIAGNOSIS — Z51.81 ENCOUNTER FOR MONITORING LONG-TERM PROTON PUMP INHIBITOR THERAPY: ICD-10-CM

## 2023-12-16 DIAGNOSIS — E04.2 MULTINODULAR GOITER (NONTOXIC): ICD-10-CM

## 2023-12-16 DIAGNOSIS — Z13.6 ENCOUNTER FOR LIPID SCREENING FOR CARDIOVASCULAR DISEASE: ICD-10-CM

## 2023-12-16 DIAGNOSIS — Z00.00 ANNUAL PHYSICAL EXAM: ICD-10-CM

## 2023-12-16 DIAGNOSIS — R73.03 PREDIABETES: ICD-10-CM

## 2023-12-16 LAB
ALBUMIN SERPL BCP-MCNC: 3.5 G/DL (ref 3.5–5.2)
ALBUMIN SERPL BCP-MCNC: 3.5 G/DL (ref 3.5–5.2)
ALP SERPL-CCNC: 101 U/L (ref 55–135)
ALP SERPL-CCNC: 101 U/L (ref 55–135)
ALT SERPL W/O P-5'-P-CCNC: 15 U/L (ref 10–44)
ALT SERPL W/O P-5'-P-CCNC: 15 U/L (ref 10–44)
ANION GAP SERPL CALC-SCNC: 9 MMOL/L (ref 8–16)
ANION GAP SERPL CALC-SCNC: 9 MMOL/L (ref 8–16)
AST SERPL-CCNC: 17 U/L (ref 10–40)
AST SERPL-CCNC: 17 U/L (ref 10–40)
BASOPHILS # BLD AUTO: 0.06 K/UL (ref 0–0.2)
BASOPHILS NFR BLD: 0.8 % (ref 0–1.9)
BILIRUB SERPL-MCNC: 0.3 MG/DL (ref 0.1–1)
BILIRUB SERPL-MCNC: 0.3 MG/DL (ref 0.1–1)
BUN SERPL-MCNC: 14 MG/DL (ref 8–23)
BUN SERPL-MCNC: 14 MG/DL (ref 8–23)
CALCIUM SERPL-MCNC: 9.2 MG/DL (ref 8.7–10.5)
CALCIUM SERPL-MCNC: 9.2 MG/DL (ref 8.7–10.5)
CHLORIDE SERPL-SCNC: 109 MMOL/L (ref 95–110)
CHLORIDE SERPL-SCNC: 109 MMOL/L (ref 95–110)
CHOLEST SERPL-MCNC: 179 MG/DL (ref 120–199)
CHOLEST/HDLC SERPL: 3.7 {RATIO} (ref 2–5)
CO2 SERPL-SCNC: 24 MMOL/L (ref 23–29)
CO2 SERPL-SCNC: 24 MMOL/L (ref 23–29)
CREAT SERPL-MCNC: 0.8 MG/DL (ref 0.5–1.4)
CREAT SERPL-MCNC: 0.8 MG/DL (ref 0.5–1.4)
DIFFERENTIAL METHOD: ABNORMAL
EOSINOPHIL # BLD AUTO: 0.1 K/UL (ref 0–0.5)
EOSINOPHIL NFR BLD: 2 % (ref 0–8)
ERYTHROCYTE [DISTWIDTH] IN BLOOD BY AUTOMATED COUNT: 16.6 % (ref 11.5–14.5)
EST. GFR  (NO RACE VARIABLE): >60 ML/MIN/1.73 M^2
EST. GFR  (NO RACE VARIABLE): >60 ML/MIN/1.73 M^2
ESTIMATED AVG GLUCOSE: 137 MG/DL (ref 68–131)
ESTIMATED AVG GLUCOSE: 137 MG/DL (ref 68–131)
GLUCOSE SERPL-MCNC: 104 MG/DL (ref 70–110)
GLUCOSE SERPL-MCNC: 104 MG/DL (ref 70–110)
HBA1C MFR BLD: 6.4 % (ref 4–5.6)
HBA1C MFR BLD: 6.4 % (ref 4–5.6)
HCT VFR BLD AUTO: 35.5 % (ref 37–48.5)
HDLC SERPL-MCNC: 48 MG/DL (ref 40–75)
HDLC SERPL: 26.8 % (ref 20–50)
HGB BLD-MCNC: 10.8 G/DL (ref 12–16)
IMM GRANULOCYTES # BLD AUTO: 0.03 K/UL (ref 0–0.04)
IMM GRANULOCYTES NFR BLD AUTO: 0.4 % (ref 0–0.5)
LDLC SERPL CALC-MCNC: 108 MG/DL (ref 63–159)
LYMPHOCYTES # BLD AUTO: 2.5 K/UL (ref 1–4.8)
LYMPHOCYTES NFR BLD: 34.8 % (ref 18–48)
MAGNESIUM SERPL-MCNC: 1.9 MG/DL (ref 1.6–2.6)
MCH RBC QN AUTO: 23.8 PG (ref 27–31)
MCHC RBC AUTO-ENTMCNC: 30.4 G/DL (ref 32–36)
MCV RBC AUTO: 78 FL (ref 82–98)
MONOCYTES # BLD AUTO: 0.4 K/UL (ref 0.3–1)
MONOCYTES NFR BLD: 5.5 % (ref 4–15)
NEUTROPHILS # BLD AUTO: 4 K/UL (ref 1.8–7.7)
NEUTROPHILS NFR BLD: 56.5 % (ref 38–73)
NONHDLC SERPL-MCNC: 131 MG/DL
NRBC BLD-RTO: 0 /100 WBC
PLATELET # BLD AUTO: 319 K/UL (ref 150–450)
PMV BLD AUTO: 11.1 FL (ref 9.2–12.9)
POTASSIUM SERPL-SCNC: 4 MMOL/L (ref 3.5–5.1)
POTASSIUM SERPL-SCNC: 4 MMOL/L (ref 3.5–5.1)
PROT SERPL-MCNC: 7.4 G/DL (ref 6–8.4)
PROT SERPL-MCNC: 7.4 G/DL (ref 6–8.4)
RBC # BLD AUTO: 4.54 M/UL (ref 4–5.4)
SODIUM SERPL-SCNC: 142 MMOL/L (ref 136–145)
SODIUM SERPL-SCNC: 142 MMOL/L (ref 136–145)
TRIGL SERPL-MCNC: 115 MG/DL (ref 30–150)
TSH SERPL DL<=0.005 MIU/L-ACNC: 2.39 UIU/ML (ref 0.4–4)
VIT B12 SERPL-MCNC: 368 PG/ML (ref 210–950)
WBC # BLD AUTO: 7.06 K/UL (ref 3.9–12.7)

## 2023-12-16 PROCEDURE — 80061 LIPID PANEL: CPT | Performed by: FAMILY MEDICINE

## 2023-12-16 PROCEDURE — 80053 COMPREHEN METABOLIC PANEL: CPT | Performed by: FAMILY MEDICINE

## 2023-12-16 PROCEDURE — 84443 ASSAY THYROID STIM HORMONE: CPT | Performed by: FAMILY MEDICINE

## 2023-12-16 PROCEDURE — 82607 VITAMIN B-12: CPT | Performed by: FAMILY MEDICINE

## 2023-12-16 PROCEDURE — 83735 ASSAY OF MAGNESIUM: CPT | Performed by: FAMILY MEDICINE

## 2023-12-16 PROCEDURE — 36415 COLL VENOUS BLD VENIPUNCTURE: CPT | Performed by: FAMILY MEDICINE

## 2023-12-16 PROCEDURE — 83036 HEMOGLOBIN GLYCOSYLATED A1C: CPT | Performed by: FAMILY MEDICINE

## 2023-12-16 PROCEDURE — 85025 COMPLETE CBC W/AUTO DIFF WBC: CPT | Performed by: FAMILY MEDICINE

## 2023-12-26 ENCOUNTER — OFFICE VISIT (OUTPATIENT)
Dept: PRIMARY CARE CLINIC | Facility: CLINIC | Age: 63
End: 2023-12-26
Payer: COMMERCIAL

## 2023-12-26 VITALS
DIASTOLIC BLOOD PRESSURE: 92 MMHG | BODY MASS INDEX: 32.99 KG/M2 | HEIGHT: 65 IN | WEIGHT: 198 LBS | HEART RATE: 96 BPM | TEMPERATURE: 98 F | RESPIRATION RATE: 18 BRPM | SYSTOLIC BLOOD PRESSURE: 140 MMHG | OXYGEN SATURATION: 99 %

## 2023-12-26 DIAGNOSIS — D50.9 IRON DEFICIENCY ANEMIA, UNSPECIFIED IRON DEFICIENCY ANEMIA TYPE: ICD-10-CM

## 2023-12-26 DIAGNOSIS — Z12.39 ENCOUNTER FOR SCREENING FOR MALIGNANT NEOPLASM OF BREAST, UNSPECIFIED SCREENING MODALITY: ICD-10-CM

## 2023-12-26 DIAGNOSIS — Z00.00 ANNUAL PHYSICAL EXAM: Primary | ICD-10-CM

## 2023-12-26 DIAGNOSIS — K21.9 GASTROESOPHAGEAL REFLUX DISEASE WITHOUT ESOPHAGITIS: ICD-10-CM

## 2023-12-26 DIAGNOSIS — D35.01 ADENOMA OF RIGHT ADRENAL GLAND: ICD-10-CM

## 2023-12-26 DIAGNOSIS — Z80.41 FAMILY HISTORY OF OVARIAN CANCER: ICD-10-CM

## 2023-12-26 DIAGNOSIS — Z51.81 ENCOUNTER FOR MONITORING LONG-TERM PROTON PUMP INHIBITOR THERAPY: ICD-10-CM

## 2023-12-26 DIAGNOSIS — Z79.899 ENCOUNTER FOR MONITORING LONG-TERM PROTON PUMP INHIBITOR THERAPY: ICD-10-CM

## 2023-12-26 DIAGNOSIS — R73.03 PREDIABETES: ICD-10-CM

## 2023-12-26 DIAGNOSIS — E78.5 HYPERLIPIDEMIA, UNSPECIFIED HYPERLIPIDEMIA TYPE: ICD-10-CM

## 2023-12-26 PROCEDURE — 3008F BODY MASS INDEX DOCD: CPT | Mod: CPTII,S$GLB,, | Performed by: FAMILY MEDICINE

## 2023-12-26 PROCEDURE — 99999 PR PBB SHADOW E&M-EST. PATIENT-LVL V: CPT | Mod: PBBFAC,,, | Performed by: FAMILY MEDICINE

## 2023-12-26 PROCEDURE — 99999 PR PBB SHADOW E&M-EST. PATIENT-LVL V: ICD-10-PCS | Mod: PBBFAC,,, | Performed by: FAMILY MEDICINE

## 2023-12-26 PROCEDURE — 99396 PREV VISIT EST AGE 40-64: CPT | Mod: S$GLB,,, | Performed by: FAMILY MEDICINE

## 2023-12-26 PROCEDURE — 3008F PR BODY MASS INDEX (BMI) DOCUMENTED: ICD-10-PCS | Mod: CPTII,S$GLB,, | Performed by: FAMILY MEDICINE

## 2023-12-26 PROCEDURE — 3077F PR MOST RECENT SYSTOLIC BLOOD PRESSURE >= 140 MM HG: ICD-10-PCS | Mod: CPTII,S$GLB,, | Performed by: FAMILY MEDICINE

## 2023-12-26 PROCEDURE — 3044F HG A1C LEVEL LT 7.0%: CPT | Mod: CPTII,S$GLB,, | Performed by: FAMILY MEDICINE

## 2023-12-26 PROCEDURE — 1159F PR MEDICATION LIST DOCUMENTED IN MEDICAL RECORD: ICD-10-PCS | Mod: CPTII,S$GLB,, | Performed by: FAMILY MEDICINE

## 2023-12-26 PROCEDURE — 1160F PR REVIEW ALL MEDS BY PRESCRIBER/CLIN PHARMACIST DOCUMENTED: ICD-10-PCS | Mod: CPTII,S$GLB,, | Performed by: FAMILY MEDICINE

## 2023-12-26 PROCEDURE — 3044F PR MOST RECENT HEMOGLOBIN A1C LEVEL <7.0%: ICD-10-PCS | Mod: CPTII,S$GLB,, | Performed by: FAMILY MEDICINE

## 2023-12-26 PROCEDURE — 3080F DIAST BP >= 90 MM HG: CPT | Mod: CPTII,S$GLB,, | Performed by: FAMILY MEDICINE

## 2023-12-26 PROCEDURE — 1160F RVW MEDS BY RX/DR IN RCRD: CPT | Mod: CPTII,S$GLB,, | Performed by: FAMILY MEDICINE

## 2023-12-26 PROCEDURE — 3080F PR MOST RECENT DIASTOLIC BLOOD PRESSURE >= 90 MM HG: ICD-10-PCS | Mod: CPTII,S$GLB,, | Performed by: FAMILY MEDICINE

## 2023-12-26 PROCEDURE — 1159F MED LIST DOCD IN RCRD: CPT | Mod: CPTII,S$GLB,, | Performed by: FAMILY MEDICINE

## 2023-12-26 PROCEDURE — 99396 PR PREVENTIVE VISIT,EST,40-64: ICD-10-PCS | Mod: S$GLB,,, | Performed by: FAMILY MEDICINE

## 2023-12-26 PROCEDURE — 3077F SYST BP >= 140 MM HG: CPT | Mod: CPTII,S$GLB,, | Performed by: FAMILY MEDICINE

## 2023-12-26 RX ORDER — METFORMIN HYDROCHLORIDE 500 MG/1
500 TABLET, EXTENDED RELEASE ORAL 2 TIMES DAILY WITH MEALS
Qty: 180 TABLET | Refills: 3 | Status: SHIPPED | OUTPATIENT
Start: 2023-12-26 | End: 2024-12-25

## 2023-12-26 RX ORDER — ESOMEPRAZOLE MAGNESIUM 40 MG/1
40 CAPSULE, DELAYED RELEASE ORAL
Qty: 90 CAPSULE | Refills: 3 | Status: SHIPPED | OUTPATIENT
Start: 2023-12-26

## 2023-12-26 RX ORDER — PRAVASTATIN SODIUM 40 MG/1
40 TABLET ORAL DAILY
Qty: 90 TABLET | Refills: 3 | Status: SHIPPED | OUTPATIENT
Start: 2023-12-26

## 2023-12-26 NOTE — PROGRESS NOTES
Subjective:       Patient ID: Teodora Yuen is a 63 y.o. female.    Chief Complaint: Annual Exam and Headache (Sharp pain start at the base of her neck and rises up /Pain is not consistent/Symptoms started about a coup;r of weeks ago  )    Headache       64 y/o female with Vit D def, hx of Osteopenia, hlpd, r adrenal mass, multiple thyroid nodules s/p L thyroid lobectomy, hx of diverticulitis, hiatal hernia, low ferritin/iron, hx of gastric ulcer, gerd, family hx of ovarian ca is here for annual exam.     Her shingles resolves, no longer having pain, stopped gabapentin, still has occasional itching in that area, triamcinolone not helpful, benadryl helps    Has home bp cuff not checking, weight stable    She reports neck pain off and on x 2 weeks, no accident or injury, pain is R sided neck, feels like a cramp and spreads to upper head, has occurred a few times a week randomly, 8/10, lasts about 10 minutes and resolves with stretching. Ibuprofen helped. No accident or injury. She is walking daily for exercise, does a lot of yard work.      She is feeling good in general, she denies f/n/v/d/constipation/cp/sob/urinary sx. She is eating healthy. She is not sleeping well, takes hydroxyzine helps when she takes it      R wrist fracture: following with ortho, completed OT  Gerd: Dr. Zelaya did EGD 2 years, she has a hiatal hernia, has gastric ulcer in the past,trial of nexium 20 mg was done and she developed breakthrough, nexium 40 mg daily  Vitamin D def: on supplement  Low ferritin/iron: on supplement  Multiple thyroid nodules: s/p L sided lobectomy 11/2021, following with endo  Prediabetes: a1c 6.4 12/2023, off metformin as she ran out  HLPD: pravstatin 40 mg daily  GYN: no recent pelvic but stacy with bso in 1999 secondary to family hx of ovarian cancer and endometriosis, mmg overdue  Eye exam utd at [employer]  Dental utd  R sided adrenal mass: was seen by Urology, CT abd/pelvic 6/2022 stable  Osteopenia: was on Actonel  "for 6 months in the past, repeat dexa due  Colonoscopy done in 2015 or 2016 Dr. Zelaya; hx of Diverticulitis, cologuard negative 1/2022    Review of Systems   Neurological:  Positive for headaches.     see HPI  Objective:      BP (!) 140/92 (BP Location: Left arm, Patient Position: Sitting, BP Method: Medium (Manual))   Pulse 96   Temp 98.2 °F (36.8 °C) (Oral)   Resp 18   Ht 5' 5" (1.651 m)   Wt 89.8 kg (197 lb 15.6 oz)   SpO2 99%   BMI 32.94 kg/m²   Physical Exam  Vitals and nursing note reviewed.   Constitutional:       Appearance: She is well-developed.   HENT:      Head: Normocephalic and atraumatic.      Right Ear: Tympanic membrane normal.      Left Ear: Tympanic membrane normal.      Nose: No congestion.      Mouth/Throat:      Pharynx: No oropharyngeal exudate or posterior oropharyngeal erythema.   Neck:      Thyroid: No thyromegaly.   Cardiovascular:      Rate and Rhythm: Normal rate and regular rhythm.      Heart sounds: Normal heart sounds.   Pulmonary:      Effort: Pulmonary effort is normal. No respiratory distress.      Breath sounds: Normal breath sounds.   Abdominal:      General: Bowel sounds are normal. There is no distension.      Palpations: Abdomen is soft. There is no mass.      Tenderness: There is no abdominal tenderness.   Musculoskeletal:      Cervical back: Normal range of motion and neck supple.      Right lower leg: No edema.      Left lower leg: No edema.   Lymphadenopathy:      Cervical: No cervical adenopathy.   Skin:     General: Skin is warm and dry.   Neurological:      Mental Status: She is alert.         Assessment:       1. Annual physical exam    2. Hyperlipidemia, unspecified hyperlipidemia type    3. Gastroesophageal reflux disease without esophagitis    4. Encounter for screening for malignant neoplasm of breast, unspecified screening modality    5. Adenoma of right adrenal gland    6. Prediabetes    7. Iron deficiency anemia, unspecified iron deficiency anemia type  "   8. Encounter for monitoring long-term proton pump inhibitor therapy    9. Family history of ovarian cancer        Plan:   Teodora was seen today for annual exam and headache.    Diagnoses and all orders for this visit:    Annual physical exam    Hyperlipidemia, unspecified hyperlipidemia type  -     pravastatin (PRAVACHOL) 40 MG tablet; Take 1 tablet (40 mg total) by mouth once daily.    Gastroesophageal reflux disease without esophagitis  -     esomeprazole (NEXIUM) 40 MG capsule; Take 1 capsule (40 mg total) by mouth daily before breakfast.  -     Vitamin D; Future    Encounter for screening for malignant neoplasm of breast, unspecified screening modality  -     Mammo Digital Screening Bilat w/ Jim; Future    Adenoma of right adrenal gland  -     US Kidney; Future  -     Ambulatory referral/consult to Urology; Future    Prediabetes  -     Hemoglobin A1C; Future  -     metFORMIN (GLUCOPHAGE-XR) 500 MG ER 24hr tablet; Take 1 tablet (500 mg total) by mouth 2 (two) times daily with meals.    Iron deficiency anemia, unspecified iron deficiency anemia type  -     Iron and TIBC; Future  -     Ferritin; Future    Encounter for monitoring long-term proton pump inhibitor therapy  -     Vitamin D; Future    Family history of ovarian cancer

## 2023-12-26 NOTE — PATIENT INSTRUCTIONS
RSV vaccine at pharmacy    Can do B12 otc 500 mcg daily as needed    Trial of allegra daily or claritin

## 2023-12-27 DIAGNOSIS — D50.9 IRON DEFICIENCY ANEMIA, UNSPECIFIED IRON DEFICIENCY ANEMIA TYPE: Primary | ICD-10-CM

## 2023-12-27 DIAGNOSIS — R09.89 THROAT CLEARING: ICD-10-CM

## 2023-12-27 DIAGNOSIS — R09.82 PND (POST-NASAL DRIP): ICD-10-CM

## 2023-12-27 RX ORDER — FLUTICASONE PROPIONATE 50 MCG
1 SPRAY, SUSPENSION (ML) NASAL DAILY
Qty: 48 G | Refills: 3 | Status: SHIPPED | OUTPATIENT
Start: 2023-12-27

## 2023-12-27 NOTE — TELEPHONE ENCOUNTER
Refill Decision Note   Teodora Yuen  is requesting a refill authorization.  Brief Assessment and Rationale for Refill:  Approve     Medication Therapy Plan:         Comments:     Note composed:4:00 PM 12/27/2023

## 2023-12-27 NOTE — TELEPHONE ENCOUNTER
No care due was identified.  [facility] Embedded Care Due Messages. Reference number: [id number].   12/27/2023 7:07:26 AM CST

## 2023-12-28 RX ORDER — FERROUS GLUCONATE 324(38)MG
324 TABLET ORAL
Qty: 90 TABLET | Refills: 4 | Status: SHIPPED | OUTPATIENT
Start: 2023-12-28

## 2024-02-18 DIAGNOSIS — Z72.820 POOR SLEEP: ICD-10-CM

## 2024-02-19 RX ORDER — HYDROXYZINE HYDROCHLORIDE 10 MG/1
10 TABLET, FILM COATED ORAL NIGHTLY PRN
Qty: 90 TABLET | Refills: 1 | Status: SHIPPED | OUTPATIENT
Start: 2024-02-19

## 2024-03-25 ENCOUNTER — PATIENT MESSAGE (OUTPATIENT)
Dept: PRIMARY CARE CLINIC | Facility: CLINIC | Age: 64
End: 2024-03-25
Payer: COMMERCIAL

## 2024-08-05 ENCOUNTER — PATIENT MESSAGE (OUTPATIENT)
Dept: ADMINISTRATIVE | Facility: HOSPITAL | Age: 64
End: 2024-08-05
Payer: COMMERCIAL

## 2024-08-13 DIAGNOSIS — K21.9 GASTROESOPHAGEAL REFLUX DISEASE WITHOUT ESOPHAGITIS: ICD-10-CM

## 2024-08-13 DIAGNOSIS — E78.5 HYPERLIPIDEMIA, UNSPECIFIED HYPERLIPIDEMIA TYPE: ICD-10-CM

## 2024-08-13 RX ORDER — PRAVASTATIN SODIUM 40 MG/1
TABLET ORAL
Refills: 0 | OUTPATIENT
Start: 2024-08-13

## 2024-08-13 RX ORDER — ESOMEPRAZOLE MAGNESIUM 40 MG/1
CAPSULE, DELAYED RELEASE ORAL
Refills: 0 | OUTPATIENT
Start: 2024-08-13

## 2024-08-13 NOTE — TELEPHONE ENCOUNTER
Refill Decision Note   Teodora Yuen  is requesting a refill authorization.  Brief Assessment and Rationale for Refill:  Quick Discontinue     Medication Therapy Plan:   Pharmacy is requesting new scripts for the following medications without required information, (sig/ frequency/qty/etc)        Comments: Pharmacies have been requesting medications for patients without required information, (sig, frequency, qty, etc.). In addition, requests are sent for medication(s) pt. are currently not taking, and medications patients have never taken.    We have spoken to the pharmacies about these request types and advised their teams previously that we are unable to assess these New Script requests and require all details for these requests. This is a known issue and has been reported.     Note composed:1:37 PM 08/13/2024

## 2024-08-29 ENCOUNTER — HOSPITAL ENCOUNTER (EMERGENCY)
Facility: HOSPITAL | Age: 64
Discharge: HOME | End: 2024-08-29
Attending: EMERGENCY MEDICINE
Payer: COMMERCIAL

## 2024-08-29 VITALS
WEIGHT: 190 LBS | DIASTOLIC BLOOD PRESSURE: 90 MMHG | HEART RATE: 73 BPM | TEMPERATURE: 98 F | BODY MASS INDEX: 31.65 KG/M2 | RESPIRATION RATE: 16 BRPM | OXYGEN SATURATION: 96 % | SYSTOLIC BLOOD PRESSURE: 138 MMHG | HEIGHT: 65 IN

## 2024-08-29 DIAGNOSIS — M25.569 KNEE PAIN: ICD-10-CM

## 2024-08-29 PROCEDURE — 99283 EMERGENCY DEPT VISIT LOW MDM: CPT | Mod: 25

## 2024-08-29 PROCEDURE — 25000003 PHARM REV CODE 250

## 2024-08-29 RX ORDER — NAPROXEN 500 MG/1
500 TABLET ORAL 2 TIMES DAILY WITH MEALS
Qty: 60 TABLET | Refills: 0 | Status: SHIPPED | OUTPATIENT
Start: 2024-08-29

## 2024-08-29 RX ORDER — IBUPROFEN 400 MG/1
800 TABLET ORAL
Status: COMPLETED | OUTPATIENT
Start: 2024-08-29 | End: 2024-08-29

## 2024-08-29 RX ADMIN — IBUPROFEN 800 MG: 400 TABLET, FILM COATED ORAL at 06:08

## 2024-08-29 NOTE — ED NOTES
Patient states she was turning quickly, twisted her right knee with pain to right knee at 1400 today Tylenol at 1700

## 2024-08-29 NOTE — FIRST PROVIDER EVALUATION
"Medical screening examination initiated.  I have conducted a focused provider triage encounter, findings are as follows:    Brief history of present illness:  Getting ready for a meeting, turned quickly, twisted knee, had had pain putting weight on it since. Took two tylenol.     Vitals:    08/29/24 1801   BP: (!) 141/95   Pulse: 90   Resp: 20   Temp: 97.8 °F (36.6 °C)   TempSrc: Oral   SpO2: 98%   Weight: 86.2 kg (190 lb)   Height: 5' 5" (1.651 m)       Pertinent physical exam:  right LE NV intact    Brief workup plan:  xray right knee     Preliminary workup initiated; this workup will be continued and followed by the physician or advanced practice provider that is assigned to the patient when roomed.  "

## 2024-08-29 NOTE — Clinical Note
"Teodora Man" Alpa was seen and treated in our emergency department on 8/29/2024.  She may return to work on 08/30/2024.       If you have any questions or concerns, please don't hesitate to call.      Moriah GARCIA    "

## 2024-08-29 NOTE — ED PROVIDER NOTES
Encounter Date: 8/29/2024       History     Chief Complaint   Patient presents with    Knee Pain     Pt having right knee pain after sharp twist to joint. Difficulty bearing weight.     64 y.o. female with a PMH of HLD, GERD, right ankle fracture s/p operative repair presents to [facility] Department for evaluation of right knee pain and swelling that began today 1 hour prior to arrival.  Patient reports she was in a meeting and was wearing high heels standing up when she dropped something and twisted to her left causing her right knee to twist.  She experienced immediate onset pain and has been unable to walk since.  She is now able to stand up but was not initially.  She denies any fall or other injuries/pain.  She denies any changes in sensation.  She has never hurt this knee in the past.  Not on anticoagulation.  No fevers or chills.        The history is provided by the patient and medical records.     Review of patient's allergies indicates:   Allergen Reactions    Coconut Hives     Past Medical History:   Diagnosis Date    Adrenal mass, right     GERD (gastroesophageal reflux disease)     Hyperlipidemia      Past Surgical History:   Procedure Laterality Date    BILE DUCT STENT PLACEMENT      CHOLECYSTECTOMY      FRACTURE SURGERY  2006 & 2010    Ankle, place pin the remove pin    HYSTERECTOMY  1999    KRISTIN with BSO secondary to family hx ovarian ca and endometriosis    R ankle surgery Right     THYROID LOBECTOMY Left 11/24/2021    Procedure: LOBECTOMY, THYROID;  Surgeon: Reyes Harrison MD;  Location: [facility] OR [address] [place] [address];  Service: ENT;  Laterality: Left;    toe fracture Left     4th metatarsal     Family History   Problem Relation Name Age of Onset    Cancer Mother 1940         ovarian    Cancer Maternal Grandmother Elisabeth BLUM         ovarian    Cancer Maternal Grandfather          colon    Diabetes Neg Hx      Heart disease Neg Hx      Hyperlipidemia Neg Hx      Hypertension Neg Hx      Stroke Neg Hx        Social History     Tobacco Use    Smoking status: Never     Passive exposure: Never    Smokeless tobacco: Never   Substance Use Topics    Alcohol use: Yes     Alcohol/week: 1.0 standard drink of alcohol     Types: 1 Glasses of wine per week     Comment: social    Drug use: Never     Review of Systems    Physical Exam     Initial Vitals [08/29/24 1801]   BP Pulse Resp Temp SpO2   (!) 141/95 90 20 97.8 °F (36.6 °C) 98 %      MAP       --         Physical Exam    Nursing note and vitals reviewed.  Constitutional: She appears well-developed and well-nourished. No distress.   HENT:   Head: Normocephalic.   Mouth/Throat: Oropharynx is clear and moist.   Eyes: Conjunctivae are normal. Pupils are equal, round, and reactive to light. No scleral icterus.   Neck: Neck supple.   Cardiovascular:  Normal rate and regular rhythm.           Pulmonary/Chest: Breath sounds normal. No stridor. No respiratory distress.   Abdominal: Abdomen is soft. She exhibits no distension. There is no abdominal tenderness.   Musculoskeletal:         General: Tenderness (Right knee with moderate tenderness in superior popliteal fossa/distal humerus and over lower patella, no tibial plateau tenderness, extensor mechanism intact, able to stand but not ambulate, mild knee effusion/edema, no ecchymosis or hematoma appreciated) and edema present.      Cervical back: Neck supple.      Comments: Right knee no pain with valgus or varus stress, no significant joint laxity with posterior or anterior drawer test, posterior drawer tests causes pain, no gross bony deformity.  2+ DP pulses bilaterally.  Normal sensation throughout.  5/5 strength with plantar flexion and dorsiflexion bilaterally.  Normal cap refill less than 2 seconds.    No right hip, right lower leg, right ankle tenderness or swelling.  No localized swelling in right popliteal fossa.     Neurological: She is alert. GCS score is 15. GCS eye subscore is 4. GCS verbal subscore is 5. GCS motor  subscore is 6.   Skin: Skin is warm and dry. No rash noted.         ED Course   Procedures  Labs Reviewed - No data to display       Imaging Results              X-Ray Knee 3 View Right (Final result)  Result time 08/29/24 19:41:55      Final result by Amilcar Cole MD (08/29/24 19:41:55)                   Impression:      1. No acute displaced fracture dislocation of the knee.      Electronically signed by: Amilcar Cole MD  Date:    08/29/2024  Time:    19:41               Narrative:    EXAMINATION:  XR KNEE 3 VIEW RIGHT    CLINICAL HISTORY:  Pain in unspecified knee    TECHNIQUE:  AP, lateral, and Merchant views of the right knee were performed.    COMPARISON:  None    FINDINGS:  Three views right knee.    No acute displaced fracture or dislocation of the knee.  No radiopaque foreign body.  No significant edema.  No large knee joint effusion.                                    X-Rays:   Independently Interpreted Readings:   Other Readings:  Right knee x-rays without any acute fracture or dislocation.    Medications   ibuprofen tablet 800 mg (800 mg Oral Given 8/29/24 1856)     Medical Decision Making  Teodora Yuen is a 64 y.o. female who presents with right knee pain after a twisting injury that occurred 1 hour prior to arrival.    Patient is afebrile, hemodynamically stable, and in no acute distress on arrival.     Differential diagnoses considered include, but not limited to:  Fracture, dislocation, ligamentous injury, meniscal tear, strain/sprain    Considered but do not suspect tibial plateau fracture as patient has no tenderness over the tibial plateau and mechanism is unlikely to cause this.  Considered popliteal artery injury but again do not suspect mechanism of injury would cause this and patient has no localized swelling, hematoma, or ecchymosis in the popliteal fossa.  She has 2+ DP pulses bilaterally.    Considered patellar tendon rupture of extensor mechanism is intact and patient is able to  straighten leg and stand.    Will administer ibuprofen, patient took Tylenol prior to arrival .   Will obtain right knee x-ray .     Right knee x-ray shows no acute displaced fracture or dislocation.    Discussed findings, plan for discharge home, follow-up with Orthopedic surgery in clinic as soon as possible, crutches, WBAT only if she has no pain at all, supportive care, prescription for naproxen, and return precautions and patient expressed understanding and agreement.  Crutches and instruction provided.    Amount and/or Complexity of Data Reviewed  Radiology: ordered and independent interpretation performed. Decision-making details documented in ED Course.    Risk  OTC drugs.  Prescription drug management.                                      Clinical Impression:  Final diagnoses:  [M25.569] Knee pain          ED Disposition Condition    Discharge Stable          ED Prescriptions       Medication Sig Dispense Start Date End Date Auth. Provider    naproxen (NAPROSYN) 500 MG tablet Take 1 tablet (500 mg total) by mouth 2 (two) times daily with meals. Take with meals.  Do not take with any other NSAIDs such as ibuprofen, Toradol, or any medications that contain NSAIDs. 60 tablet 8/29/2024 -- Stacie Lloyd MD          Follow-up Information       Follow up With Specialties Details Why Contact Info    [facility] Orthopedics   [address] [facility] [facility] [address]  [phone]    Tere Brown MD Family Medicine   [address] [place] [address]  [address]  [place] [address]  [phone]      [facility] - Emergency Dept Emergency Medicine  If symptoms worsen [address] [facility] [id number] [phone]             Stacie Lloyd MD  Resident  08/30/24 1220       Stacie Lloyd MD  Resident  08/30/24 1220

## 2024-08-29 NOTE — ED NOTES
Patient identifiers verified and correct for [facility]  C/C:  Right knee pain SEE NN  APPEARANCE: awake and alert in NAD. PAIN  8/10  SKIN: warm, dry and intact. No breakdown or bruising.  MUSCULOSKELETAL: Patient moving all extremities spontaneously, no obvious swelling or deformities noted. Ambulates independently.  RESPIRATORY: Denies shortness of breath.Respirations unlabored.   CARDIAC: Denies CP, 2+ distal pulses; no peripheral edema  ABDOMEN: S/ND/NT, Denies nausea  : voids spontaneously, denies difficulty  Neurologic: AAO x 4; follows commands equal strength in all extremities; denies numbness/tingling. Denies dizziness  Dneis new weakness

## 2024-08-30 NOTE — DISCHARGE INSTRUCTIONS
Follow-up in Orthopedic surgery Clinic.  Crutches have been provided for your comfort, you may bear weight as tolerated.  Do not bear weight on your right leg if you are experiencing any pain or instability.

## 2024-09-05 ENCOUNTER — OFFICE VISIT (OUTPATIENT)
Dept: ORTHOPEDICS | Facility: CLINIC | Age: 64
End: 2024-09-05
Payer: COMMERCIAL

## 2024-09-05 DIAGNOSIS — S89.91XA RIGHT KNEE INJURY, INITIAL ENCOUNTER: Primary | ICD-10-CM

## 2024-09-05 DIAGNOSIS — M25.561 ACUTE PAIN OF RIGHT KNEE: ICD-10-CM

## 2024-09-05 PROCEDURE — 99999 PR PBB SHADOW E&M-EST. PATIENT-LVL III: CPT | Mod: PBBFAC,,, | Performed by: PHYSICIAN ASSISTANT

## 2024-09-05 PROCEDURE — 1159F MED LIST DOCD IN RCRD: CPT | Mod: CPTII,S$GLB,, | Performed by: PHYSICIAN ASSISTANT

## 2024-09-05 PROCEDURE — 1160F RVW MEDS BY RX/DR IN RCRD: CPT | Mod: CPTII,S$GLB,, | Performed by: PHYSICIAN ASSISTANT

## 2024-09-05 PROCEDURE — 99203 OFFICE O/P NEW LOW 30 MIN: CPT | Mod: S$GLB,,, | Performed by: PHYSICIAN ASSISTANT

## 2024-09-05 RX ORDER — IBUPROFEN 600 MG/1
600 TABLET ORAL EVERY 6 HOURS PRN
Qty: 40 TABLET | Refills: 0 | Status: SHIPPED | OUTPATIENT
Start: 2024-09-05

## 2024-09-05 NOTE — PROGRESS NOTES
SUBJECTIVE:     History of Present Illness:  Teodora Yuen is a 64 y.o. year old female here with complaints of right knee pain that started 9/29/2024.  Her injury occurred at work.  She twisted her knee while attempting to catch some files that were falling.  She reports immediate pain afterwards and difficulty bearing weight.  The pain is described as achy.    She has pain with standing, walking.  She also reports some swelling.  Previous treatments include rest, ice, elevation, brace, and naproxen.   She was seen in the emergency room initially.  There is not a history of previous injury or surgery to the knee.  She is using a crutch to ambulate.  She works as a .    Review of patient's allergies indicates:   Allergen Reactions    Coconut Hives         Current Outpatient Medications   Medication Sig Dispense Refill    calcium carbonate-vitamin D3 500 mg(1,250mg) -400 unit Tab once daily.       esomeprazole (NEXIUM) 40 MG capsule Take 1 capsule (40 mg total) by mouth daily before breakfast. 90 capsule 3    ferrous gluconate (FERGON) 324 MG tablet Take 1 tablet (324 mg total) by mouth 3 (three) times daily with meals. 90 tablet 4    fluticasone propionate (FLONASE) 50 mcg/actuation nasal spray Use 1 spray (50 mcg total) in each nostril once daily. 48 g 3    hydrOXYzine HCL (ATARAX) 10 MG Tab Take 1 tablet (10 mg total) by mouth nightly as needed (nightly as needed). 90 tablet 1    ibuprofen (ADVIL,MOTRIN) 800 MG tablet Take 1 tablet (800 mg total) by mouth 3 (three) times daily as needed for Pain. 20 tablet 0    metFORMIN (GLUCOPHAGE-XR) 500 MG ER 24hr tablet Take 1 tablet (500 mg total) by mouth 2 (two) times daily with meals. 180 tablet 3    naproxen (NAPROSYN) 500 MG tablet Take 1 tablet (500 mg total) by mouth 2 (two) times daily with meals. Take with meals.  Do not take with any other NSAIDs such as ibuprofen, Toradol, or any medications that contain NSAIDs. 60 tablet 0    pravastatin (PRAVACHOL) 40  MG tablet Take 1 tablet (40 mg total) by mouth once daily. 90 tablet 3    triamcinolone acetonide 0.1% (KENALOG) 0.1 % ointment Apply topically 2 (two) times daily. 80 g 0     No current facility-administered medications for this visit.       Past Medical History:   Diagnosis Date    Adrenal mass, right     GERD (gastroesophageal reflux disease)     Hyperlipidemia        Past Surgical History:   Procedure Laterality Date    BILE DUCT STENT PLACEMENT      CHOLECYSTECTOMY      FRACTURE SURGERY  2006 & 2010    Ankle, place pin the remove pin    HYSTERECTOMY  1999    KRISTIN with BSO secondary to family hx ovarian ca and endometriosis    R ankle surgery Right     THYROID LOBECTOMY Left 11/24/2021    Procedure: LOBECTOMY, THYROID;  Surgeon: Reyes Harrison MD;  Location: [facility] OR [address] [place] [address];  Service: ENT;  Laterality: Left;    toe fracture Left     4th metatarsal         Review of Systems:  ROS:  Constitutional: no fever or chills  Eyes: no visual changes  ENT: no nasal congestion or sore throat  Respiratory: no cough or shortness of breath  Musculoskeletal: no arthralgias or myalgias      OBJECTIVE:     PHYSICAL EXAM:        General: Well developed, well nourished, in no acute distress.  Neurological: Mood & affect are normal.  HEENT: NCAT, sclera nonicteric   Lungs: Respirations are equal and unlabored.   CV: 2+ bilateral upper and lower extremity pulses.   Skin: Intact throughout with no rashes, erythema, or lesions  Extremities: No LE edema, no erythema or warmth of the skin in either lower extremity.    right Knee Exam:  Knee Range of Motion: 0-120   Effusion: mild  Condition of skin: intact  Location of tenderness: Medial joint line   Strength: 5 of 5 quadriceps strength and 5 of 5 hamstring strength  Stability:  stable to testing  Varus /Valgus stress:  normal  Jeff:  positive      Right Hip Examination:  full painless range of motion, without tenderness    IMAGING:    X-rays of the right knee, personally  reviewed by me, demonstrate mild degenerative changes.  No fracture or dislocation.       ASSESSMENT     1. Right knee injury, initial encounter    2. Acute pain of right knee        PLAN:     We discussed with the patient at length all the different treatment options available for the knee including NSAIDS, acetaminophen, rest, ice, knee strengthening exercise, steroid injections for temporary relief, Viscosupplementation, and knee arthroplasty.     - MRI right knee to rule out meniscus tear  - OK to resume sedentary work  - Rest, ice, brace  - WBAT, use crutch as needed  - Ibuprofen 600 mg   - Will call with MRI results and discuss further plan

## 2024-09-06 ENCOUNTER — PATIENT MESSAGE (OUTPATIENT)
Dept: ORTHOPEDICS | Facility: CLINIC | Age: 64
End: 2024-09-06
Payer: COMMERCIAL

## 2024-09-14 ENCOUNTER — HOSPITAL ENCOUNTER (OUTPATIENT)
Dept: RADIOLOGY | Facility: OTHER | Age: 64
Discharge: HOME | End: 2024-09-14
Attending: PHYSICIAN ASSISTANT
Payer: COMMERCIAL

## 2024-09-14 ENCOUNTER — PATIENT MESSAGE (OUTPATIENT)
Dept: ORTHOPEDICS | Facility: CLINIC | Age: 64
End: 2024-09-14
Payer: COMMERCIAL

## 2024-09-14 DIAGNOSIS — S83.241A ACUTE MEDIAL MENISCUS TEAR OF RIGHT KNEE, INITIAL ENCOUNTER: Primary | ICD-10-CM

## 2024-09-14 DIAGNOSIS — S89.91XA RIGHT KNEE INJURY, INITIAL ENCOUNTER: ICD-10-CM

## 2024-09-14 DIAGNOSIS — M25.561 ACUTE PAIN OF RIGHT KNEE: ICD-10-CM

## 2024-09-14 PROCEDURE — 73721 MRI JNT OF LWR EXTRE W/O DYE: CPT | Mod: TC,RT

## 2024-09-16 ENCOUNTER — TELEPHONE (OUTPATIENT)
Dept: ORTHOPEDICS | Facility: CLINIC | Age: 64
End: 2024-09-16
Payer: COMMERCIAL

## 2024-09-16 DIAGNOSIS — S83.206A: Primary | ICD-10-CM

## 2024-09-19 ENCOUNTER — OFFICE VISIT (OUTPATIENT)
Dept: ORTHOPEDICS | Facility: CLINIC | Age: 64
End: 2024-09-19
Payer: COMMERCIAL

## 2024-09-19 DIAGNOSIS — S83.241A ACUTE MEDIAL MENISCUS TEAR OF RIGHT KNEE, INITIAL ENCOUNTER: Primary | ICD-10-CM

## 2024-09-19 DIAGNOSIS — M17.11 PRIMARY OSTEOARTHRITIS OF RIGHT KNEE: ICD-10-CM

## 2024-09-19 DIAGNOSIS — S83.281A ACUTE LATERAL MENISCUS TEAR OF RIGHT KNEE, INITIAL ENCOUNTER: ICD-10-CM

## 2024-09-19 PROCEDURE — 99999 PR PBB SHADOW E&M-EST. PATIENT-LVL II: CPT | Mod: PBBFAC,,, | Performed by: PHYSICIAN ASSISTANT

## 2024-09-19 RX ORDER — LIDOCAINE HYDROCHLORIDE 10 MG/ML
2 INJECTION, SOLUTION INFILTRATION; PERINEURAL
Status: DISCONTINUED | OUTPATIENT
Start: 2024-09-19 | End: 2024-09-19 | Stop reason: HOSPADM

## 2024-09-19 RX ORDER — TRIAMCINOLONE ACETONIDE 40 MG/ML
40 INJECTION, SUSPENSION INTRA-ARTICULAR; INTRAMUSCULAR
Status: DISCONTINUED | OUTPATIENT
Start: 2024-09-19 | End: 2024-09-19 | Stop reason: HOSPADM

## 2024-09-19 RX ADMIN — TRIAMCINOLONE ACETONIDE 40 MG: 40 INJECTION, SUSPENSION INTRA-ARTICULAR; INTRAMUSCULAR at 06:09

## 2024-09-19 RX ADMIN — LIDOCAINE HYDROCHLORIDE 2 ML: 10 INJECTION, SOLUTION INFILTRATION; PERINEURAL at 06:09

## 2024-09-19 NOTE — PROGRESS NOTES
SUBJECTIVE:     Interval history 9/19/24  She is here for follow up of her right knee pain.  Since her last visit she had an MRI which we discussed over the phone. She was  was referred to PT and is here today for an injection in the right knee.  She is scheduled to start therapy soon.  She is taking ibuprofen as needed and ambulating with a crutch for support.  Most of her pain remains on the medial aspect of the knee.    History of Present Illness:  Teodora Yuen is a 64 y.o. year old female here with complaints of right knee pain that started 8/29/2024.  Her injury occurred at work.  She twisted her knee while attempting to catch some files that were falling.  She reports immediate pain afterwards and difficulty bearing weight.  The pain is described as achy.    She has pain with standing, walking.  She also reports some swelling.  Previous treatments include rest, ice, elevation, brace, and naproxen.   She was seen in the emergency room initially.  There is not a history of previous injury or surgery to the knee.  She is using a crutch to ambulate.  She works as a .    Review of patient's allergies indicates:   Allergen Reactions    Coconut Hives         Current Outpatient Medications   Medication Sig Dispense Refill    calcium carbonate-vitamin D3 500 mg(1,250mg) -400 unit Tab once daily.       esomeprazole (NEXIUM) 40 MG capsule Take 1 capsule (40 mg total) by mouth daily before breakfast. 90 capsule 3    ferrous gluconate (FERGON) 324 MG tablet Take 1 tablet (324 mg total) by mouth 3 (three) times daily with meals. 90 tablet 4    fluticasone propionate (FLONASE) 50 mcg/actuation nasal spray Use 1 spray (50 mcg total) in each nostril once daily. 48 g 3    hydrOXYzine HCL (ATARAX) 10 MG Tab Take 1 tablet (10 mg total) by mouth nightly as needed (nightly as needed). 90 tablet 1    ibuprofen (ADVIL,MOTRIN) 600 MG tablet Take 1 tablet (600 mg total) by mouth every 6 (six) hours as needed for Pain. 40  tablet 0    ibuprofen (ADVIL,MOTRIN) 800 MG tablet Take 1 tablet (800 mg total) by mouth 3 (three) times daily as needed for Pain. 20 tablet 0    metFORMIN (GLUCOPHAGE-XR) 500 MG ER 24hr tablet Take 1 tablet (500 mg total) by mouth 2 (two) times daily with meals. 180 tablet 3    naproxen (NAPROSYN) 500 MG tablet Take 1 tablet (500 mg total) by mouth 2 (two) times daily with meals. Take with meals.  Do not take with any other NSAIDs such as ibuprofen, Toradol, or any medications that contain NSAIDs. 60 tablet 0    pravastatin (PRAVACHOL) 40 MG tablet Take 1 tablet (40 mg total) by mouth once daily. 90 tablet 3    triamcinolone acetonide 0.1% (KENALOG) 0.1 % ointment Apply topically 2 (two) times daily. 80 g 0     No current facility-administered medications for this visit.       Past Medical History:   Diagnosis Date    Adrenal mass, right     GERD (gastroesophageal reflux disease)     Hyperlipidemia        Past Surgical History:   Procedure Laterality Date    BILE DUCT STENT PLACEMENT      CHOLECYSTECTOMY      FRACTURE SURGERY  2006 & 2010    Ankle, place pin the remove pin    HYSTERECTOMY  1999    KRISTIN with BSO secondary to family hx ovarian ca and endometriosis    R ankle surgery Right     THYROID LOBECTOMY Left 11/24/2021    Procedure: LOBECTOMY, THYROID;  Surgeon: Reyes Harrison MD;  Location: [facility] OR [address] [place] [address];  Service: ENT;  Laterality: Left;    toe fracture Left     4th metatarsal         Review of Systems:  ROS:  Constitutional: no fever or chills  Eyes: no visual changes  ENT: no nasal congestion or sore throat  Respiratory: no cough or shortness of breath  Musculoskeletal: no arthralgias or myalgias      OBJECTIVE:     PHYSICAL EXAM:        General: Well developed, well nourished, in no acute distress.  Neurological: Mood & affect are normal.  HEENT: NCAT, sclera nonicteric   Lungs: Respirations are equal and unlabored.   CV: 2+ bilateral upper and lower extremity pulses.   Skin: Intact throughout with  no rashes, erythema, or lesions  Extremities: No LE edema, no erythema or warmth of the skin in either lower extremity.    right Knee Exam:  Knee Range of Motion: 0-120   Effusion: minimal  Condition of skin: intact  Location of tenderness: Medial joint line   Strength: 5 of 5 quadriceps strength and 5 of 5 hamstring strength  Stability:  stable to testing  Varus /Valgus stress:  normal  Jeff:  positive    IMAGING:    X-rays of the right knee, personally reviewed by me, demonstrate mild degenerative changes.  No fracture or dislocation.     MRI right knee  Impression:     1. Medial and lateral horizontal meniscus tears.  2. Tricompartmental cartilage loss.  3. Small joint effusion with small ruptured Baker's cyst.      ASSESSMENT     1. Acute medial meniscus tear of right knee, initial encounter    2. Acute lateral meniscus tear of right knee, initial encounter    3. Primary osteoarthritis of right knee          PLAN:       - Hinged knee brace- pending authorization  - WBAT  - PT  - Right knee CSI performed today- see procedure note  - Continue RICE, ibuprofen as needed  - follow up in 6 weeks

## 2024-09-19 NOTE — PROCEDURES
Large Joint Aspiration/Injection: R knee    Date/Time: 9/19/2024 6:00 PM    Performed by: Jordyn Rojas PA-C  Authorized by: Jordyn Rojas PA-C    Consent Done?:  Yes (Verbal)  Indications:  Pain  Timeout: prior to procedure the correct patient, procedure, and site was verified    Prep: patient was prepped and draped in usual sterile fashion    Local anesthetic:  Topical anesthetic    Details:  Needle Size:  22 G  Approach:  Anterolateral  Location:  Knee  Site:  R knee  Medications:  40 mg triamcinolone acetonide 40 mg/mL; 2 mL LIDOcaine HCL 10 mg/ml (1%) 10 mg/mL (1 %)  Patient tolerance:  Patient tolerated the procedure well with no immediate complications

## 2024-09-23 ENCOUNTER — CLINICAL SUPPORT (OUTPATIENT)
Dept: REHABILITATION | Facility: OTHER | Age: 64
End: 2024-09-23
Payer: COMMERCIAL

## 2024-09-23 DIAGNOSIS — M25.561 ACUTE PAIN OF RIGHT KNEE: Primary | ICD-10-CM

## 2024-09-23 DIAGNOSIS — S83.281A ACUTE LATERAL MENISCUS TEAR OF RIGHT KNEE, INITIAL ENCOUNTER: ICD-10-CM

## 2024-09-23 DIAGNOSIS — S83.241A ACUTE MEDIAL MENISCUS TEAR OF RIGHT KNEE, INITIAL ENCOUNTER: Primary | ICD-10-CM

## 2024-09-23 DIAGNOSIS — Z74.09 IMPAIRED FUNCTIONAL MOBILITY AND ACTIVITY TOLERANCE: ICD-10-CM

## 2024-09-23 DIAGNOSIS — R26.89 ANTALGIC GAIT: ICD-10-CM

## 2024-09-23 DIAGNOSIS — S83.206A: ICD-10-CM

## 2024-09-23 PROCEDURE — 97162 PT EVAL MOD COMPLEX 30 MIN: CPT | Mod: PN | Performed by: PHYSICAL THERAPIST

## 2024-09-23 PROCEDURE — 97530 THERAPEUTIC ACTIVITIES: CPT | Mod: PN | Performed by: PHYSICAL THERAPIST

## 2024-09-23 NOTE — PLAN OF CARE
[facility]   Physical Therapy Initial Evaluation      Name: Teodora Yuen  Clinic Number: [id number]    Therapy Diagnosis:   Encounter Diagnoses   Name Primary?    Traumatic tear of meniscus of knee, right, initial encounter     Acute pain of right knee Yes    Antalgic gait     Impaired functional mobility and activity tolerance      Physician: Jordyn Rojas PA-C    Physician Orders: PT Eval and Treat   Medical Diagnosis from Referral: S83.206A (ICD-10-CM) - Traumatic tear of meniscus of knee, right, initial encounter  Evaluation Date: 9/23/2024  Authorization Period Expiration: 9/16/2025  Plan of Care Expiration: 10/23/2024  Visit # / Visits authorized: 1/ 12    Foto  Date  Score    #1/3 9/23/2024 36%   #2/3     #3/3       Time In: 0725  Time Out: 0815  Total Appointment Time (timed & untimed codes): 10 minutes    Precautions: Standard    Subjective     Date of injury: 8/29/24  History of current condition - she was carrying binders at work, they started to fall and she twisted to catch them. She then sat down at the desk and 10 minutes later was unable to get up because her knee was in pain. Went to ED, had x-ray and was given 2 crutches and referred to orthopedics. She had MRI which revealed meniscal tear. She notes pain with stepping/twisting on R foot, prolonged sitting  (props up at work). Relief with ice, ibuprofen, elevation.      Prior medical treatment: imaging, injection    Occupation/job title:   Job demands: prolonged sitting with computer, meeting set up (carrying and putting out materials, make coffee and set up snacks)    Current work restrictions: nothing stated from MD, pt is limited with walking   Previous work status: full time  Current work status: full time  Date last worked (if applicable): n/a, still working     Imaging: MRI studies: FINDINGS:  Menisci:  There is a horizontal tear of the posterior horn and body segment medial meniscus.  There is a  horizontal tear of the body segment lateral meniscus.     Ligaments:  ACL, PCL, MCL, and LCL complex are intact.     Tendons:  Extensor mechanism is maintained.     Cartilage:     Patellofemoral: Partial-thickness fissuring noted throughout the patella.  Full-thickness fissuring noted over the central trochlea.     Medial tibiofemoral: Full-thickness cartilage loss throughout the weight-bearing femoral condyle with generalized thinning over the opposing tibial plateau.  Mild subchondral edema of the femoral condyle.     Lateral tibiofemoral: Full-thickness cartilage loss noted over the posterior tibial plateau.     Bone: No fracture or marrow replacing process.     Miscellaneous: Small joint effusion with small ruptured Baker's cyst.     Impression:     1. Medial and lateral horizontal meniscus tears.  2. Tricompartmental cartilage loss.  3. Small joint effusion with small ruptured Baker's cyst.    Social History: Pt lives with her daughter, and mother lives in the other half of the house. Home is on second story, initially she was going up/down on her behind, but has been gradually returning to normal. Pt typically does a lot of work around the house for her mother, but daughter is having to to that currently. She is able to drive with her L foot. Mod I with ADL's. Assistance with housework.   Recreation: prior to injury pt would walk after work, and sometimes walk the 10 blocks to work. Currently very limited with walking    Pain:  Current 5/10, worst 9/10, best 2/10   Location: posterior R knee  Description: Aching and Sharp with certain movement   Aggravating Factors: prolonged sitting, walking, twisting, stair navigation  Easing Factors: pain medication, ice, and elevation    Pt's goals: Return to employment according to previous level of function, be able to walk without crutches, be able to wear her heels.     Medical History:   Past Medical History:   Diagnosis Date    Adrenal mass, right     GERD  (gastroesophageal reflux disease)     Hyperlipidemia        Surgical History:   Teodora Yuen  has a past surgical history that includes Cholecystectomy; Bile duct stent placement; toe fracture (Left); R ankle surgery (Right); Hysterectomy (1999); Fracture surgery (2006 & 2010); and Thyroid lobectomy (Left, 11/24/2021).    Medications:   Teodora has a current medication list which includes the following prescription(s): calcium carbonate-vitamin d3, esomeprazole, ferrous gluconate, fluticasone propionate, hydroxyzine hcl, ibuprofen, ibuprofen, metformin, naproxen, pravastatin, and triamcinolone acetonide 0.1%.    Allergies:   Review of patient's allergies indicates:   Allergen Reactions    Coconut Hives        Objective         Observation: pt is alert and oriented, good historian.     Gait: antalgic pattern with unilateral axillary crutch        Range of Motion:   Knee Left active Left Passive Right Active R passive   Flexion 135 140 118 140 (ERP)   Extension 0 +5 0 +5 (ERP)           Lower Extremity Strength  Right LE  Left LE    Ankle dorsiflexion: 4/5 Ankle dorsiflexion: 5/5   Knee extension: 4/5 Knee extension: 5/5   Knee flexion: 4-/5 Knee flexion: 5/5   Hip flexion: 4-/5 Hip flexion: 4+/5   Hip external rotation 4/5 Hip external rotation 5/5   Hip internal rotation 4-/5 Hip internal rotation 5/5   Hip abduction:  4/5 Hip abduction: 4+/5   Hip adduction: 4-/5 Hip adduction: 5/5   Hip extension: 4-/5 Hip extension 4-/5         Function:    - SLS R: poor  - SLS L: good      Joint Mobility:  Patellar motion painful with downward glide    Palpation: tenderness to posterior/medial R knee      Flexibility:    Hamstring: R = WFL; L = WFL   Quad: R = mild hathaway ; L = WFL        Functional Job Specific Testing:   Job Specific Task Job Demands Current Ability   1. Computer use Sitting at desk limited   2.  Meeting set-up Carrying binders, making coffee limited        Treatment   Total Treatment time (time-based codes)  separate from Evaluation: 10 minutes    therapeutic activities for 10 minutes:  Patient education on nature of current condition and PHYSICAL THERAPY POC  Written HOME EXERCISE PROGRAM provided, reviewed, patient demo understanding           Home Exercises and Patient Education Provided  Education provided:   - yes    Home Exercises Provided: yes.  Exercises were reviewed and Teodora was able to demonstrate them prior to the end of the session.  Teodora demonstrated good  understanding of the education provided.     Assessment     Teodora is a 64 y.o. referred to outpatient Physical Therapy with a medical diagnosis of S83.206A (ICD-10-CM) - Traumatic tear of meniscus of knee, right, initial encounter.     Pt presents with decreased ROM, muscle strength, flexibility, joint mobility. Increased pain, stiffness, soft tissue restriction. Impaired posture, joint mechanics, balance, gait pattern.     The patient's current job specific task deficits include the following: limited tolerance to sitting, gait deficits limit abilities with walking/carrying tasks    Patient prognosis: Good.   Rehab potential: good    In my opinion, do the patient's subjective deficits match the objective findings (if no, please explain)?   - yes     Were there any discrepancies or inconsistencies between objective testing (if yes, please explain)?  - no     In my opinion, did the patient provide good effort throughout the examination (if no, please explain)?  - yes    Patient will benefit from skilled outpatient Physical Therapy to address the deficits stated above and in the chart below, provide patient /family education, to maximize patient's level of independence, and to address work-related functional deficits for their job as a .    Plan of care discussed with patient: Yes  Patient's spiritual, cultural and educational needs considered and patient is agreeable to the plan of care and goals as stated below:     Anticipated Barriers for  therapy: fear of re-injury and self-limiting behaviors due to pain    Medical Necessity is demonstrated by the following  History  Co-morbidities and personal factors that may impact the plan of care [] LOW: no personal factors / co-morbidities  [x] MODERATE: 1-2 personal factors / co-morbidities  [] HIGH: 3+ personal factors / co-morbidities    Moderate / High Support Documentation:   Co-morbidities affecting plan of care: none    Personal Factors:   coping style  lifestyle     Examination  Body Structures and Functions, activity limitations and participation restrictions that may impact the plan of care [] LOW: addressing 1-2 elements  [x] MODERATE: 3+ elements  [] HIGH: 4+ elements (please support below)    Moderate / High Support Documentation: gait, stair navigation, walking/carrying at work, active ROM, weakness     Clinical Presentation [] LOW: stable  [x] MODERATE: Evolving  [] HIGH: Unstable     Decision Making/ Complexity Score: moderate       Goals:   Short Terms Goals: 2 weeks    Goal  Progress  Date    (1)  Patient will be I with HOME EXERCISE PROGRAM  Initial, Not Met    (2)  Patient will  demonstrate AROM of R knee improved to WFL/equal to L  Initial, Not Met      (3)   Patient will report improved tolerance to sitting at computer up to 1 hour  Initial, Not Met        Long Term Goals: 4 weeks    Goal  Progress  Date    (1)  Patient will report ability to ambulate up to 30 min to return to OF with community mobility Initial, Not Met     (2)   Patient will report ability to sit at computer as needed with minimal symptom exacerbation  Initial, Not Met     (3)   Patient will demonstrate B LE strength 4+/5 to improve ability with stair navigation and mobility for work tasks.   Initial, Not Met        Plan     Plan of care Certification: 9/23/2024 to 10/23/2024.    Outpatient Physical Therapy 3 times weekly for 4 weeks to include the following interventions: Cervical/Lumbar Traction, Electrical  Stimulation re-eval, dry needling, Gait Training, Iontophoresis (with ), Manual Therapy, Moist Heat/ Ice, Neuromuscular Re-ed, Patient Education, Self Care, Therapeutic Activities, and Therapeutic Exercise.     Upon discharge from further skilled PT intervention it will determined if the need for a work conditioning program or Functional Capacity Evaluation is required to allow the injured worker to return to work with full potential achieved, continued improvement with body mechanics with advanced functional activities, and further minimize future work-related injuries.     Physical therapist and physical therapy assistant(s) will met face to face to discuss patient's treatment plan and progress towards established goals. Pt will be seen by a physical therapist minimally every 6th visit or every 30 days.    Yi Mcdonald, PT  9/23/2024       I CERTIFY THE NEED FOR THESE SERVICES FURNISHED UNDER THIS PLAN OF TREATMENT AND WHILE UNDER MY CARE     Physician's comments:           Physician's Signature: ___________________________________________________

## 2024-09-24 ENCOUNTER — PATIENT MESSAGE (OUTPATIENT)
Dept: ORTHOPEDICS | Facility: CLINIC | Age: 64
End: 2024-09-24
Payer: COMMERCIAL

## 2024-09-27 ENCOUNTER — CLINICAL SUPPORT (OUTPATIENT)
Dept: REHABILITATION | Facility: OTHER | Age: 64
End: 2024-09-27
Payer: COMMERCIAL

## 2024-09-27 ENCOUNTER — PATIENT MESSAGE (OUTPATIENT)
Dept: REHABILITATION | Facility: OTHER | Age: 64
End: 2024-09-27
Payer: COMMERCIAL

## 2024-09-27 DIAGNOSIS — M25.561 ACUTE PAIN OF RIGHT KNEE: Primary | ICD-10-CM

## 2024-09-27 DIAGNOSIS — R26.89 ANTALGIC GAIT: ICD-10-CM

## 2024-09-27 DIAGNOSIS — Z74.09 IMPAIRED FUNCTIONAL MOBILITY AND ACTIVITY TOLERANCE: ICD-10-CM

## 2024-09-27 PROCEDURE — 97530 THERAPEUTIC ACTIVITIES: CPT | Mod: PN

## 2024-09-27 PROCEDURE — 97112 NEUROMUSCULAR REEDUCATION: CPT | Mod: PN

## 2024-09-27 NOTE — PROGRESS NOTES
"[facility]   Physical Therapy Treatment Note      Name: Teodora Yuen  Clinic Number: [id number]    Therapy Diagnosis:   Encounter Diagnoses   Name Primary?    Acute pain of right knee Yes    Antalgic gait     Impaired functional mobility and activity tolerance      Physician: Jordyn Rojas PA-C    Visit Date: 9/27/2024    Physician Orders: PT Eval and Treat   Medical Diagnosis from Referral: S83.206A (ICD-10-CM) - Traumatic tear of meniscus of knee, right, initial encounter  Evaluation Date: 9/23/2024  Authorization Period Expiration: 9/16/2025  Plan of Care Expiration: 10/23/2024  Visit # / Visits authorized: 2/ 12   (# of No Show Appts 0/Number of Cancelled Appts 0)    Foto  Date  Score    #1/3 9/23/2024 36%   #2/3       #3/3          Time In: 0705  Time Out: 0800  Total Appointment Time (timed & untimed codes): 55 minutes      Precautions: Standard    Occupation/job title: [employer]  Job demands: prolonged sitting with computer, meeting set up (carrying and putting out materials, make coffee and set up snacks)     Current work restrictions: nothing stated from MD, pt is limited with walking   Previous work status: full time  Current work status: full time  Date last worked (if applicable): n/a, still working       Subjective     Patient reports: "I only started moving more without the crutches only 2 days ago. I have most of my pain on the inside and outside of the [right] knee. It still feels swollen in the back of the knee. I can put pressure on the knee but I have to wear the brace they gave me [with lateral hinge support] otherwise it's too painful to weight bear on the R knee."  She was compliant with home exercise program. - noted increased pain with heel slides when doing after all of the other exercises  Response to previous treatment: fair  Functional change: none    Pain: 5/10  Location: RIGHT knee (posterior, medial, lateral)    Objective      Objective Measures updated " "at progress report unless specified.     [id number]    Gait: antalgic pattern with unilateral axillary crutch        Range of Motion:   Knee Left active Left Passive Right Active R passive   Flexion 135 140 118 140 (ERP)   Extension 0 +5 0 +5 (ERP)            Lower Extremity Strength  Right LE   Left LE     Ankle dorsiflexion: 4/5 Ankle dorsiflexion: 5/5   Knee extension: 4/5 Knee extension: 5/5   Knee flexion: 4-/5 Knee flexion: 5/5   Hip flexion: 4-/5 Hip flexion: 4+/5   Hip external rotation 4/5 Hip external rotation 5/5   Hip internal rotation 4-/5 Hip internal rotation 5/5   Hip abduction:  4/5 Hip abduction: 4+/5   Hip adduction: 4-/5 Hip adduction: 5/5   Hip extension: 4-/5 Hip extension 4-/5         Functional Job Specific Testing:   Job Specific Task Job Demands Current Ability   1. Computer use Sitting at desk limited   2.  Meeting set-up Carrying binders, making coffee limited      Treatment     Teodora received the treatments listed below:      therapeutic exercises to develop strength, endurance, ROM, flexibility, posture, and core stabilization for 00 minutes including:  None today    manual therapy techniques: Joint mobilizations, Manual traction, Myofacial release, and Soft tissue Mobilization were applied to the: R knee for 5 minutes, including:  Patellar glides all directions x 3x30" Gr II-III    neuromuscular re-education activities to improve: Balance, Coordination, Kinesthetic, Sense, Proprioception, and Posture for 35 minutes. The following activities were included:  Long sit heel slides x 2'  Heel prop x2'  QS x3x10 x 5" holds - defer heel lift, emphasis on press into towel  QS into SLR x3x10 - VC maintain TKE  SL hip abd x2x10  Prone hip ext - UA 2* increased pain in M/L knee  SAQ with ball squeeze x3x10   Stool crawls x 15 ft - poor tolerance w/ "soreness" sx    Consider adding next visit: recumbent bike (tolerable ROM or semi circles for circulation, ROM, edema mgmt), clams, LAQ, seated HS curls " "(or isometrics in HL if poor tolerance), LLB over PB if poor tolerance with HL bridge    therapeutic activities to improve functional performance for 15  minutes, including:  Shuttle DL x2x10 x 1 black cord  Shuttle SL x 2x10 x 0 cords  Pt edu on importance of compliance and consistency with compression and ice to promote effusion/edema reduction, pain modulation, and tolerance with ADLs. Encouraged 20 min ice sessions several times per day, as well as repetitive movement in tolerance ROM - including seated knee bends and LAQ - to further promote pain modulation and edema reduction.    Patient Education and Home Exercises       Education provided:   - none today    Home Exercises Provided: Patient instructed to cont prior HEP. Exercises were reviewed and Teodora was able to demonstrate them prior to the end of the session.  Teodora demonstrated good  understanding of the education provided. See EMR under Patient Instructions for exercises provided during therapy sessions    Assessment     Reviewed HEP for understanding and technique. Fair tolerance with heel slides, but modified quad set w/o heel lift to limit c/o sharp pinch pain on medial and lateral aspects of the knee. Good tolerance with SLR flex and abd with moderate mm quivering in quads, but poor tolerance with hip ext w/ increased sx. Attempted to promote TKE wih SAQ and HS activation with stool crawls. Noted peripatellar crepitus w/ SAQ that resolved with simultaneous ball squeeze for VMO activation; however, poor tolerance with stool crawls with notable fatigue and reported soreness globally, but without reproduction of sharp "deep" pain, indicating MFR restrictions in post mm's vs posterior shearing over menisci. Consider modifications to program next visit to target quad activation, glute med>max strength for motor control and knee positioning, and early activation of hamstrings (isometrics vs resisted) to promote posterior stabilization to knee, with " ultimate goal of improving tolerance with ADLs, work tasks, and pain modulation.    The patient's current job specific task deficits include the following: limited tolerance to sitting, gait deficits limit abilities with walking/carrying tasks       Teodora Santacruz is making good progress towards meeting her goals.     Patient prognosis: Good.   Rehab potential: good      Pt will continue to benefit from skilled Physical Therapy interventions in order to address the deficits listed in the problem list box on initial evaluation, provide education, and to address the musculoskeletal limitations and work-related functional deficits for their job as a .    Pt's spiritual, cultural and educational needs considered and pt agreeable to plan of care and goals.     Anticipated barriers to physical therapy: fear of re-injury and self-limiting behaviors due to pain     Goals: updated 9/27/2024   Short Terms Goals: 2 weeks     Goal  Progress  Date    (1)  Patient will be I with HOME EXERCISE PROGRAM  Initial, Not Met 9/27/2024     (2)  Patient will  demonstrate AROM of R knee improved to WFL/equal to L  Initial, Not Met   9/27/2024    (3)   Patient will report improved tolerance to sitting at computer up to 1 hour  Initial, Not Met   9/27/2024       Long Term Goals: 4 weeks     Goal  Progress  Date    (1)  Patient will report ability to ambulate up to 30 min to return to [facility] with community mobility Initial, Not Met  9/27/2024     (2)   Patient will report ability to sit at computer as needed with minimal symptom exacerbation  Initial, Not Met  9/27/2024    (3)   Patient will demonstrate B LE strength 4+/5 to improve ability with stair navigation and mobility for work tasks.   Initial, Not Met   9/27/2024        Plan     Cont with POC for mobility, strength, and stability of the R knee with ADLs, work tasks.    Nancy Wilkins, PT   9/27/2024

## 2024-09-30 ENCOUNTER — CLINICAL SUPPORT (OUTPATIENT)
Dept: REHABILITATION | Facility: OTHER | Age: 64
End: 2024-09-30
Payer: COMMERCIAL

## 2024-09-30 DIAGNOSIS — M25.561 ACUTE PAIN OF RIGHT KNEE: Primary | ICD-10-CM

## 2024-09-30 DIAGNOSIS — Z74.09 IMPAIRED FUNCTIONAL MOBILITY AND ACTIVITY TOLERANCE: ICD-10-CM

## 2024-09-30 DIAGNOSIS — R26.89 ANTALGIC GAIT: ICD-10-CM

## 2024-09-30 PROCEDURE — 97112 NEUROMUSCULAR REEDUCATION: CPT | Mod: PN,CQ

## 2024-09-30 PROCEDURE — 97110 THERAPEUTIC EXERCISES: CPT | Mod: PN,CQ

## 2024-09-30 PROCEDURE — 97530 THERAPEUTIC ACTIVITIES: CPT | Mod: PN,CQ

## 2024-09-30 RX ORDER — IBUPROFEN 600 MG/1
600 TABLET ORAL EVERY 6 HOURS PRN
Qty: 40 TABLET | Refills: 0 | Status: SHIPPED | OUTPATIENT
Start: 2024-09-30

## 2024-09-30 NOTE — PROGRESS NOTES
"[facility]   Physical Therapy Treatment Note      Name: Teodora Yuen  Clinic Number: [id number]    Therapy Diagnosis:   Encounter Diagnoses   Name Primary?    Acute pain of right knee Yes    Antalgic gait     Impaired functional mobility and activity tolerance      Physician: Jordyn Rojas PA-C    Visit Date: 9/30/2024    Physician Orders: PT Eval and Treat   Medical Diagnosis from Referral: S83.206A (ICD-10-CM) - Traumatic tear of meniscus of knee, right, initial encounter  Evaluation Date: 9/23/2024  Authorization Period Expiration: 9/16/2025  Plan of Care Expiration: 10/23/2024  Visit # / Visits authorized: 3/ 12   (# of No Show Appts 0/Number of Cancelled Appts 0)    Foto  Date  Score    #1/3 9/23/2024 36%   #2/3       #3/3          Time In: 0720  Time Out: 0815  Total Appointment Time (timed & untimed codes): 55 minutes      Precautions: Standard    Occupation/job title: [employer]  Job demands: prolonged sitting with computer, meeting set up (carrying and putting out materials, make coffee and set up snacks)     Current work restrictions: nothing stated from MD, pt is limited with walking   Previous work status: full time  Current work status: full time  Date last worked (if applicable): n/a, still working       Subjective     Patient reports: she had a lot of medial knee pain during last visit. States she feels like it is still swollen some. States she sits at work with "my legs dangling." But she will prop them up so they are straight. States she can't have her knees bent for too long when working.     She was compliant with home exercise program. - noted increased pain with heel slides when doing after all of the other exercises  Response to previous treatment: fair, poor tolerance to stool scoot   Functional change: none    Pain: 5/10   Location: RIGHT knee (posterior, medial, lateral)     Objective      Objective Measures updated at progress report unless specified. " "    9/27/2024  Gait: antalgic pattern with unilateral axillary crutch     Range of Motion:   Knee Left active Left Passive Right Active R passive   Flexion 135 140 118 140 (ERP)   Extension 0 +5 0 +5 (ERP)      Lower Extremity Strength  Right LE   Left LE     Ankle dorsiflexion: 4/5 Ankle dorsiflexion: 5/5   Knee extension: 4/5 Knee extension: 5/5   Knee flexion: 4-/5 Knee flexion: 5/5   Hip flexion: 4-/5 Hip flexion: 4+/5   Hip external rotation 4/5 Hip external rotation 5/5   Hip internal rotation 4-/5 Hip internal rotation 5/5   Hip abduction:  4/5 Hip abduction: 4+/5   Hip adduction: 4-/5 Hip adduction: 5/5   Hip extension: 4-/5 Hip extension 4-/5      Functional Job Specific Testing:   Job Specific Task Job Demands Current Ability   1. Computer use Sitting at desk limited   2.  Meeting set-up Carrying binders, making coffee limited      Treatment     Teodora received the bolded treatments listed below:      therapeutic exercises to develop strength, endurance, ROM, flexibility, posture, and core stabilization for 08 minutes including:  Recumbent Bike x 6' -- tolerable ROM or semi circles for circulation, ROM, edema mgmt  Heel prop x 2'    manual therapy techniques: Joint mobilizations, Manual traction, Myofacial release, and Soft tissue Mobilization were applied to the: R knee for 00 minutes, including:  Patellar glides all directions x 3x30" Gr II-III    neuromuscular re-education activities to improve: Balance, Coordination, Kinesthetic, Sense, Proprioception, and Posture for 38 minutes. The following activities were included:  Long sit heel slides x 10" holds x 20   QS x3x10 x 5" holds - defer heel lift, emphasis on press into towel - heel lift today   QS into SLR x3x10 - VC maintain TKE  SL hip abd x3x10   SL hip add (with bolster) 2 x 10, 1x7 (increased symptoms during last set)   Prone hip ext - UA 2* increased pain in M/L knee >> modified to standing hip extension R only 2 x 10   SAQ with ball squeeze " "x3x10   Stool crawls x 15 ft - poor tolerance w/ "soreness" sx     Consider adding next visit: recumbent bike (tolerable ROM or semi circles for circulation, ROM, edema mgmt), clams, LAQ, seated HS curls (or isometrics in HL if poor tolerance), LLB over PB if poor tolerance with HL bridge    therapeutic activities to improve functional performance for 09 minutes, including:  Shuttle DL x2x10 x 1 black cord  Shuttle SL x 2x10 x 0 cords  Pt edu on importance of compliance and consistency with compression and ice to promote effusion/edema reduction, pain modulation, and tolerance with ADLs. Encouraged 20 min ice sessions several times per day, as well as repetitive movement in tolerance ROM - including seated knee bends and LAQ - to further promote pain modulation and edema reduction.  Educated pt on ergonomics at desk at work: moving form bent to straightening as tolerated and keeping knees at 90 deg when bent with a stool under feet.     Patient Education and Home Exercises       Education provided:   - none today    Home Exercises Provided: Patient instructed to cont prior HEP. Exercises were reviewed and Teodora was able to demonstrate them prior to the end of the session.  Teodora demonstrated good  understanding of the education provided. See EMR under Patient Instructions for exercises provided during therapy sessions    Assessment     Pt had good to fair tolerance to treatment progressions with no adverse effects. Increased pain and symptoms with sidelying hip adduction at medial knee, but pain decreased when exercise ceased. Modified hip extension to standing today due to decreased tolerance in prone, which pt noted good training effect. Initiated recumbent bike at beginning of session for ROM, circulation, and edema management. Pt required minimal verbal cues for SLR abduction for proper exercise technique, and educated pt on target mm and purpose of exercise. Pt verbalized understanding and was able to " independently correct hip flexor compensations. Educated pt on proper work ergonomics, and how to modify; consider reviewing nv. Continue to monitor and progress pt as tolerated to promote posterior stabilization to knee with ultimate goal of improving tolerance with ADLs, work tasks, and pain modulation.     Teodora Santacruz is making good progress towards meeting her goals.     Patient prognosis: Good.   Rehab potential: good      Pt will continue to benefit from skilled Physical Therapy interventions in order to address the deficits listed in the problem list box on initial evaluation, provide education, and to address the musculoskeletal limitations and work-related functional deficits for their job as a .    Pt's spiritual, cultural and educational needs considered and pt agreeable to plan of care and goals.     Anticipated barriers to physical therapy: fear of re-injury and self-limiting behaviors due to pain     Goals: updated 9/30/2024   Short Terms Goals: 2 weeks     Goal  Progress  Date    (1)  Patient will be I with HOME EXERCISE PROGRAM  Initial, Not Met 9/30/2024     (2)  Patient will  demonstrate AROM of R knee improved to WFL/equal to L  Initial, Not Met   9/30/2024    (3)   Patient will report improved tolerance to sitting at computer up to 1 hour  Initial, Not Met   9/30/2024       Long Term Goals: 4 weeks     Goal  Progress  Date    (1)  Patient will report ability to ambulate up to 30 min to return to [facility] with community mobility Initial, Not Met  9/30/2024     (2)   Patient will report ability to sit at computer as needed with minimal symptom exacerbation  Initial, Not Met  9/30/2024    (3)   Patient will demonstrate B LE strength 4+/5 to improve ability with stair navigation and mobility for work tasks.   Initial, Not Met   9/30/2024        Plan     Cont with POC for mobility, strength, and stability of the R knee with ADLs, work tasks.     Vivi Ordonez, PTA   9/30/2024

## 2024-10-01 ENCOUNTER — DOCUMENTATION ONLY (OUTPATIENT)
Dept: REHABILITATION | Facility: OTHER | Age: 64
End: 2024-10-01
Payer: COMMERCIAL

## 2024-10-01 ENCOUNTER — TELEPHONE (OUTPATIENT)
Dept: PHARMACY | Facility: CLINIC | Age: 64
End: 2024-10-01
Payer: COMMERCIAL

## 2024-10-01 DIAGNOSIS — R26.89 ANTALGIC GAIT: ICD-10-CM

## 2024-10-01 DIAGNOSIS — M25.561 ACUTE PAIN OF RIGHT KNEE: Primary | ICD-10-CM

## 2024-10-01 DIAGNOSIS — Z74.09 IMPAIRED FUNCTIONAL MOBILITY AND ACTIVITY TOLERANCE: ICD-10-CM

## 2024-10-01 NOTE — TELEPHONE ENCOUNTER
[facility]/Population Health Chart Review & Patient Outreach Details For Medication Adherence Project    Reason for Outreach Encounter: 3rd Party payor non-compliance report ([employer], [employer], C, etc)  2.  Patient Outreach Method: Reviewed patient chart   3.   Medication in question: metformin   LAST FILLED: n/a  Diabetes Medications               metFORMIN (GLUCOPHAGE-XR) 500 MG ER 24hr tablet Take 1 tablet (500 mg total) by mouth 2 (two) times daily with meals.              4.  Reviewed and or Updates Made To: Patient Chart  5. Outreach Outcomes and/or actions taken: Medication discontinued    Additional Notes:

## 2024-10-01 NOTE — PROGRESS NOTES
Physical Therapist and Physical Therapist Assistant met face to face to discuss patient's treatment plan and progress towards established goals. Pt will be seen by a physical therapist minimally every 6th visit or every 30 days.    Vivi Ordonez PTA  10/01/2024

## 2024-10-03 ENCOUNTER — CLINICAL SUPPORT (OUTPATIENT)
Dept: REHABILITATION | Facility: OTHER | Age: 64
End: 2024-10-03
Payer: COMMERCIAL

## 2024-10-03 DIAGNOSIS — M25.561 ACUTE PAIN OF RIGHT KNEE: Primary | ICD-10-CM

## 2024-10-03 DIAGNOSIS — Z74.09 IMPAIRED FUNCTIONAL MOBILITY AND ACTIVITY TOLERANCE: ICD-10-CM

## 2024-10-03 DIAGNOSIS — R26.89 ANTALGIC GAIT: ICD-10-CM

## 2024-10-03 PROCEDURE — 97530 THERAPEUTIC ACTIVITIES: CPT | Mod: PN | Performed by: PHYSICAL THERAPIST

## 2024-10-03 PROCEDURE — 97112 NEUROMUSCULAR REEDUCATION: CPT | Mod: PN | Performed by: PHYSICAL THERAPIST

## 2024-10-03 PROCEDURE — 97110 THERAPEUTIC EXERCISES: CPT | Mod: PN | Performed by: PHYSICAL THERAPIST

## 2024-10-03 NOTE — PROGRESS NOTES
[facility]   Physical Therapy Treatment Note      Name: Teodora Yuen  Clinic Number: [id number]    Therapy Diagnosis:   Encounter Diagnoses   Name Primary?    Acute pain of right knee Yes    Antalgic gait     Impaired functional mobility and activity tolerance      Physician: Jordyn Rojas PA-C    Visit Date: 10/3/2024    Physician Orders: PT Eval and Treat   Medical Diagnosis from Referral: S83.206A (ICD-10-CM) - Traumatic tear of meniscus of knee, right, initial encounter  Evaluation Date: 9/23/2024  Authorization Period Expiration: 9/16/2025  Plan of Care Expiration: 10/23/2024  Visit # / Visits authorized: 4/ 12   (# of No Show Appts 0/Number of Cancelled Appts 0)    Foto  Date  Score    #1/3 9/23/2024 36%   #2/3       #3/3          Time In: 1715  Time Out: 1815  Total Appointment Time (timed & untimed codes): 60 minutes      Precautions: Standard    Occupation/job title:   Job demands: prolonged sitting with computer, meeting set up (carrying and putting out materials, make coffee and set up snacks)     Current work restrictions: nothing stated from MD, pt is limited with walking   Previous work status: full time  Current work status: full time  Date last worked (if applicable): n/a, still working       Subjective     Patient reports: knee is hurting since it's the end of the day.     She was compliant with home exercise program. - noted increased pain with heel slides when doing after all of the other exercises  Response to previous treatment: fair, poor tolerance to stool scoot   Functional change: none    Pain: 7/10   Location: RIGHT knee (posterior, medial, lateral)     Objective      Objective Measures updated at progress report unless specified.     9/27/2024  Gait: antalgic pattern with unilateral axillary crutch     Range of Motion:   Knee Left active Left Passive Right Active R passive   Flexion 135 140 118 140 (ERP)   Extension 0 +5 0 +5 (ERP)      Lower  "Extremity Strength  Right LE   Left LE     Ankle dorsiflexion: 4/5 Ankle dorsiflexion: 5/5   Knee extension: 4/5 Knee extension: 5/5   Knee flexion: 4-/5 Knee flexion: 5/5   Hip flexion: 4-/5 Hip flexion: 4+/5   Hip external rotation 4/5 Hip external rotation 5/5   Hip internal rotation 4-/5 Hip internal rotation 5/5   Hip abduction:  4/5 Hip abduction: 4+/5   Hip adduction: 4-/5 Hip adduction: 5/5   Hip extension: 4-/5 Hip extension 4-/5      Functional Job Specific Testing:   Job Specific Task Job Demands Current Ability   1. Computer use Sitting at desk limited   2.  Meeting set-up Carrying binders, making coffee limited      Treatment     Teodora received the bolded treatments listed below:      therapeutic exercises to develop strength, endurance, ROM, flexibility, posture, and core stabilization for 08 minutes including:  Recumbent Bike x 6' -- tolerable ROM or semi circles for circulation, ROM, edema mgmt  Heel prop x 2'  Long sit heel slides x 10" holds x 10    manual therapy techniques: Joint mobilizations, Manual traction, Myofacial release, and Soft tissue Mobilization were applied to the: R knee for 02 minutes, including:  Patellar glides all directions x 3x30" Gr II-III  Application of kineseotape to R knee, I strip to medial joint line for decompression and pain relief. Pt educated on wear and removal    neuromuscular re-education activities to improve: Balance, Coordination, Kinesthetic, Sense, Proprioception, and Posture for 35 minutes. The following activities were included:    QS x3x10 x 5" holds - defer heel lift, emphasis on press into towel - heel lift today   QS into SLR x3x10 - VC maintain TKE  SL hip abd x3x10   SL hip add (with bolster) 2 x 10, 1x7 (increased symptoms during last set)   +Hooklying add ball squeeze 5" x 30  Prone hip ext - UA 2* increased pain in M/L knee >> modified to standing hip extension R only 2 x 10   SAQ with ball squeeze x3x10   Stool crawls x 15 ft - poor tolerance w/ " ""soreness" sx     Consider adding next visit: recumbent bike (tolerable ROM or semi circles for circulation, ROM, edema mgmt), clams, LAQ, seated HS curls (or isometrics in HL if poor tolerance), LLB over PB if poor tolerance with HL bridge    therapeutic activities to improve functional performance for 15 minutes, including:  Shuttle DL x2x10 x 1 black cord  Shuttle SL x 2x10 x 0 cords  Pt edu on importance of compliance and consistency with compression and ice to promote effusion/edema reduction, pain modulation, and tolerance with ADLs. Encouraged 20 min ice sessions several times per day, as well as repetitive movement in tolerance ROM - including seated knee bends and LAQ - to further promote pain modulation and edema reduction.  Education including modifications for desk (use of step stool to support feet), setting timer for ROM hourly to reduce stiffness, and wear and removal of Ktape    Patient Education and Home Exercises       Education provided:   - none today    Home Exercises Provided: Patient instructed to cont prior HEP. Exercises were reviewed and Teodora was able to demonstrate them prior to the end of the session.  Teodora demonstrated good  understanding of the education provided. See EMR under Patient Instructions for exercises provided during therapy sessions    Assessment     Improved tolerance with treatment today. Heavy education on expectations with therapy treatment and notifying PT of pain with exercise so we can modify as needed. Trial of Kineseotape to aid in pain reduction, pt educated in wear and removal.     Teodora Is is making good progress towards meeting her goals.     Patient prognosis: Good.   Rehab potential: good      Pt will continue to benefit from skilled Physical Therapy interventions in order to address the deficits listed in the problem list box on initial evaluation, provide education, and to address the musculoskeletal limitations and work-related functional deficits for " their job as a .    Pt's spiritual, cultural and educational needs considered and pt agreeable to plan of care and goals.     Anticipated barriers to physical therapy: fear of re-injury and self-limiting behaviors due to pain     Goals: updated 10/3/2024   Short Terms Goals: 2 weeks     Goal  Progress  Date    (1)  Patient will be I with HOME EXERCISE PROGRAM  Initial, Not Met 10/3/2024     (2)  Patient will  demonstrate AROM of R knee improved to WFL/equal to L  Initial, Not Met   10/3/2024    (3)   Patient will report improved tolerance to sitting at computer up to 1 hour  Initial, Not Met   10/3/2024       Long Term Goals: 4 weeks     Goal  Progress  Date    (1)  Patient will report ability to ambulate up to 30 min to return to [facility] with community mobility Initial, Not Met  10/3/2024     (2)   Patient will report ability to sit at computer as needed with minimal symptom exacerbation  Initial, Not Met  10/3/2024    (3)   Patient will demonstrate B LE strength 4+/5 to improve ability with stair navigation and mobility for work tasks.   Initial, Not Met   10/3/2024        Plan     Cont with POC for mobility, strength, and stability of the R knee with ADLs, work tasks.     Yi Mcdonald, PT   10/3/2024

## 2024-10-08 ENCOUNTER — PATIENT MESSAGE (OUTPATIENT)
Dept: ORTHOPEDICS | Facility: CLINIC | Age: 64
End: 2024-10-08
Payer: COMMERCIAL

## 2024-10-08 DIAGNOSIS — M17.11 PRIMARY OSTEOARTHRITIS OF RIGHT KNEE: ICD-10-CM

## 2024-10-08 DIAGNOSIS — S83.241A ACUTE MEDIAL MENISCUS TEAR OF RIGHT KNEE, INITIAL ENCOUNTER: Primary | ICD-10-CM

## 2024-10-08 NOTE — PROGRESS NOTES
Patient has failed right knee CSI   Plan to try HA injections for pain relief  She is to continue PT, NSAIDS, rest, brace  Follow up pending authorization

## 2024-10-09 ENCOUNTER — CLINICAL SUPPORT (OUTPATIENT)
Dept: REHABILITATION | Facility: OTHER | Age: 64
End: 2024-10-09
Payer: COMMERCIAL

## 2024-10-09 DIAGNOSIS — Z74.09 IMPAIRED FUNCTIONAL MOBILITY AND ACTIVITY TOLERANCE: ICD-10-CM

## 2024-10-09 DIAGNOSIS — M25.561 ACUTE PAIN OF RIGHT KNEE: Primary | ICD-10-CM

## 2024-10-09 DIAGNOSIS — R26.89 ANTALGIC GAIT: ICD-10-CM

## 2024-10-09 PROCEDURE — 97110 THERAPEUTIC EXERCISES: CPT | Mod: PN | Performed by: PHYSICAL THERAPIST

## 2024-10-09 PROCEDURE — 97530 THERAPEUTIC ACTIVITIES: CPT | Mod: PN | Performed by: PHYSICAL THERAPIST

## 2024-10-09 PROCEDURE — 97112 NEUROMUSCULAR REEDUCATION: CPT | Mod: PN | Performed by: PHYSICAL THERAPIST

## 2024-10-09 NOTE — PROGRESS NOTES
[facility]   Physical Therapy Treatment Note      Name: Teodora Yuen  Clinic Number: [id number]    Therapy Diagnosis:   Encounter Diagnoses   Name Primary?    Acute pain of right knee Yes    Antalgic gait     Impaired functional mobility and activity tolerance      Physician: Jordyn Rojas PA-C    Visit Date: 10/9/2024    Physician Orders: PT Eval and Treat   Medical Diagnosis from Referral: S83.206A (ICD-10-CM) - Traumatic tear of meniscus of knee, right, initial encounter  Evaluation Date: 9/23/2024  Authorization Period Expiration: 9/16/2025  Plan of Care Expiration: 10/23/2024  Visit # / Visits authorized: 5/ 12   (# of No Show Appts 0/Number of Cancelled Appts 0)    Foto  Date  Score    #1/3 9/23/2024 36%   #2/3  10/9/2024  46%   #3/3          Time In: 0720  Time Out: 0825  Total Appointment Time (timed & untimed codes): 55 minutes      Precautions: Standard    Occupation/job title:   Job demands: prolonged sitting with computer, meeting set up (carrying and putting out materials, make coffee and set up snacks)     Current work restrictions: nothing stated from MD, pt is limited with walking   Previous work status: full time  Current work status: full time  Date last worked (if applicable): n/a, still working       Subjective     Patient reports: she had some relief with the tape, and did ok into the weekend. Sunday she did some light work in her yard - no bending or squatting, just used a weedeater to get tonio off the fence. Monday her knee was hurting more and clicking, and yesterday she took the day off and just stayed on the couch.     She was compliant with home exercise program. - noted increased pain with heel slides when doing after all of the other exercises  Response to previous treatment: fair, poor tolerance to stool scoot   Functional change: none    Pain: 7/10   Location: RIGHT knee (posterior, medial, lateral)     Objective      Objective Measures  "updated at progress report unless specified.     10/09/2024  Gait: antalgic pattern with unilateral axillary crutch     Range of Motion:   Knee Left active Left Passive Right Active R passive   Flexion 135 140 118 140 (ERP)   Extension 0 +5 0 +4 (ERP)      Lower Extremity Strength  Right LE   Left LE     Ankle dorsiflexion: 4/5 Ankle dorsiflexion: 5/5   Knee extension: 4/5 Knee extension: 5/5   Knee flexion: 4-/5 Knee flexion: 5/5   Hip flexion: 4-/5 Hip flexion: 4+/5   Hip external rotation 4/5 Hip external rotation 5/5   Hip internal rotation 4-/5 Hip internal rotation 5/5   Hip abduction:  4/5 Hip abduction: 4+/5   Hip adduction: 4-/5 Hip adduction: 5/5   Hip extension: 4-/5 Hip extension 4-/5      Functional Job Specific Testing:   Job Specific Task Job Demands Current Ability   1. Computer use Sitting at desk limited   2.  Meeting set-up Carrying binders, making coffee limited      Treatment     Teodora received the bolded treatments listed below:      therapeutic exercises to develop strength, endurance, ROM, flexibility, posture, and core stabilization for 08 minutes including:  Recumbent Bike x 6' -- tolerable ROM or semi circles for circulation, ROM, edema mgmt  Heel prop x 2'  Supine heel slides x 10" holds x 10 - with knee glide today  Hooklying add ball squeeze 5" x 30    manual therapy techniques: Joint mobilizations, Manual traction, Myofacial release, and Soft tissue Mobilization were applied to the: R knee for 02 minutes, including:  Patellar glides all directions x 3x30" Gr II-III  Application of kineseotape to R knee, I strip to medial joint line for decompression and pain relief. Pt educated on wear and removal    neuromuscular re-education activities to improve: Balance, Coordination, Kinesthetic, Sense, Proprioception, and Posture for 25 minutes. The following activities were included:    QS x3x10 x 5" holds - no towel today   QS into SLR x3x10 - VC maintain TKE  SL hip abd x3x10   SL hip add " "(with bolster) 2 x 10, 1x7 (increased symptoms during last set)   Prone hip ext - UA 2* increased pain in M/L knee >> modified to standing hip extension R only 2 x 10   SAQ with ball squeeze x3x10   Stool crawls x 15 ft - poor tolerance w/ "soreness" sx     Consider adding next visit: recumbent bike (tolerable ROM or semi circles for circulation, ROM, edema mgmt), clams, LAQ, seated HS curls (or isometrics in HL if poor tolerance), LLB over PB if poor tolerance with HL bridge    therapeutic activities to improve functional performance for 15 minutes, including:  Shuttle DL x2x10 x 1 black cord  Shuttle SL x 2x10 x 0 cords  Pt edu on importance of compliance and consistency with compression and ice to promote effusion/edema reduction, pain modulation, and tolerance with ADLs. Encouraged 20 min ice sessions several times per day, as well as repetitive movement in tolerance ROM - including seated knee bends and LAQ - to further promote pain modulation and edema reduction.  Reassessment for progress note      Ice pack x 10 min to R knee    Patient Education and Home Exercises       Education provided:   - none today    Home Exercises Provided: Patient instructed to cont prior HEP. Exercises were reviewed and Teodora was able to demonstrate them prior to the end of the session.  Teodora demonstrated good  understanding of the education provided. See EMR under Patient Instructions for exercises provided during therapy sessions    Assessment     No significant changes noted with assessment today. She has maintained active and passive motion, but remains limited with active ROM due to pain and guarding. Pt notes flare of symptoms over the weekend, so held progression of exercises today. She was able to tolerate exercise at same level as last visit with no c/o symptom exacerbation. Kineseotape applied again today as pt notes some relief at previous visit. Ice provided at end of session for pain reduction.     Teodora Is is making " good progress towards meeting her goals.     Patient prognosis: Good.   Rehab potential: good      Pt will continue to benefit from skilled Physical Therapy interventions in order to address the deficits listed in the problem list box on initial evaluation, provide education, and to address the musculoskeletal limitations and work-related functional deficits for their job as a .    Pt's spiritual, cultural and educational needs considered and pt agreeable to plan of care and goals.     Anticipated barriers to physical therapy: fear of re-injury and self-limiting behaviors due to pain     Goals: updated 10/9/2024   Short Terms Goals: 2 weeks     Goal  Progress  Date    (1)  Patient will be I with HOME EXERCISE PROGRAM  Initial, Not Met 10/9/2024     (2)  Patient will  demonstrate AROM of R knee improved to WFL/equal to L  Initial, Not Met   10/9/2024    (3)   Patient will report improved tolerance to sitting at computer up to 1 hour  Initial, Not Met   10/9/2024       Long Term Goals: 4 weeks     Goal  Progress  Date    (1)  Patient will report ability to ambulate up to 30 min to return to [facility] with community mobility Initial, Not Met  10/9/2024     (2)   Patient will report ability to sit at computer as needed with minimal symptom exacerbation  Initial, Not Met  10/9/2024    (3)   Patient will demonstrate B LE strength 4+/5 to improve ability with stair navigation and mobility for work tasks.   Initial, Not Met   10/9/2024        Plan     Cont with POC for mobility, strength, and stability of the R knee with ADLs, work tasks.     Yi Mcdonald, PT   10/9/2024

## 2024-10-10 ENCOUNTER — DOCUMENTATION ONLY (OUTPATIENT)
Dept: ORTHOPEDICS | Facility: CLINIC | Age: 64
End: 2024-10-10
Payer: COMMERCIAL

## 2024-10-10 NOTE — PROGRESS NOTES
Patient sustained knee injury at work on 8/29/2024.  She continues to have significant pain in her right knee with activity.  She has been in physical therapy and has also attempted bracing.  She did not have relief with a steroid injection in her right knee on 9/19/2024.  She has also been taking NSAIDS daily.    Due to failure of other conservative treatments for her knee, I recommend we try viscosupplementation.  I have ordered Eufflexxa injections for her right knee.      MEDICAL NECESSITY FOR VISCOSUPPLEMENTATION:  A thorough evaluation of the patient, have determined that viscosupplementation is medically necessary.  The patient has painful degenerative changes and meniscus tear of the knee with failure of conservative therapy including lifestyle modifications and rehabilitation exercises.  Oral analgesics/NSAIDs have not adequately controlled symptoms and there is radiographic evidence of joint space narrowing, subchondral sclerosis, and osteophyte formation - Kellgren Beto grade 2

## 2024-10-11 ENCOUNTER — CLINICAL SUPPORT (OUTPATIENT)
Dept: REHABILITATION | Facility: OTHER | Age: 64
End: 2024-10-11
Payer: COMMERCIAL

## 2024-10-11 DIAGNOSIS — M17.11 PRIMARY OSTEOARTHRITIS OF RIGHT KNEE: ICD-10-CM

## 2024-10-11 DIAGNOSIS — S83.281A ACUTE LATERAL MENISCUS TEAR OF RIGHT KNEE, INITIAL ENCOUNTER: ICD-10-CM

## 2024-10-11 DIAGNOSIS — Z74.09 IMPAIRED FUNCTIONAL MOBILITY AND ACTIVITY TOLERANCE: ICD-10-CM

## 2024-10-11 DIAGNOSIS — S83.241A ACUTE MEDIAL MENISCUS TEAR OF RIGHT KNEE, INITIAL ENCOUNTER: Primary | ICD-10-CM

## 2024-10-11 DIAGNOSIS — M25.561 ACUTE PAIN OF RIGHT KNEE: Primary | ICD-10-CM

## 2024-10-11 DIAGNOSIS — R26.89 ANTALGIC GAIT: ICD-10-CM

## 2024-10-11 PROCEDURE — 97530 THERAPEUTIC ACTIVITIES: CPT | Mod: PN

## 2024-10-11 PROCEDURE — 97140 MANUAL THERAPY 1/> REGIONS: CPT | Mod: PN

## 2024-10-11 PROCEDURE — 97112 NEUROMUSCULAR REEDUCATION: CPT | Mod: PN

## 2024-10-11 NOTE — PROGRESS NOTES
"[facility]   Physical Therapy Treatment Note      Name: Teodora Yuen  Clinic Number: [id number]    Therapy Diagnosis:   Encounter Diagnoses   Name Primary?    Acute pain of right knee Yes    Antalgic gait     Impaired functional mobility and activity tolerance      Physician: Jordyn Rojas PA-C    Visit Date: 10/11/2024    Physician Orders: PT Eval and Treat   Medical Diagnosis from Referral: S83.206A (ICD-10-CM) - Traumatic tear of meniscus of knee, right, initial encounter  Evaluation Date: 9/23/2024  Authorization Period Expiration: 9/16/2025  Plan of Care Expiration: 10/23/2024  Visit # / Visits authorized: 5/ 12   (# of No Show Appts 0/Number of Cancelled Appts 0)    Foto  Date  Score    #1/3 9/23/2024 36%   #2/3  10/9/2024  46%   #3/3          Time In: 0700  Time Out: 0800  Total Appointment Time (timed & untimed codes): 60 minutes      Precautions: Standard    Occupation/job title: [employer]  Job demands: prolonged sitting with computer, meeting set up (carrying and putting out materials, make coffee and set up snacks)     Current work restrictions: nothing stated from MD, pt is limited with walking   Previous work status: full time  Current work status: full time  Date last worked (if applicable): n/a, still working       Subjective     Patient reports: cont to have pain but dons compression sleeve that helps reduce daily swelling. Able to prop leg up at work as "letting it hang down can get too painful, but sometimes having it up for too long can also be painful."     She was compliant with home exercise program. - noted increased pain with heel slides when doing after all of the other exercises  Response to previous treatment: fair, poor tolerance to stool scoot   Functional change: none    Pain: 7/10   Location: RIGHT knee (posterior, medial, lateral)     Objective      Objective Measures updated at progress report unless specified.     10/09/2024  Gait: antalgic pattern " "with unilateral axillary crutch     Range of Motion:   Knee Left active Left Passive Right Active R passive   Flexion 135 140 118 140 (ERP)   Extension 0 +5 0 +4 (ERP)      Lower Extremity Strength  Right LE   Left LE     Ankle dorsiflexion: 4/5 Ankle dorsiflexion: 5/5   Knee extension: 4/5 Knee extension: 5/5   Knee flexion: 4-/5 Knee flexion: 5/5   Hip flexion: 4-/5 Hip flexion: 4+/5   Hip external rotation 4/5 Hip external rotation 5/5   Hip internal rotation 4-/5 Hip internal rotation 5/5   Hip abduction:  4/5 Hip abduction: 4+/5   Hip adduction: 4-/5 Hip adduction: 5/5   Hip extension: 4-/5 Hip extension 4-/5      Functional Job Specific Testing:   Job Specific Task Job Demands Current Ability   1. Computer use Sitting at desk limited   2.  Meeting set-up Carrying binders, making coffee limited      Treatment     Teodora received the bolded treatments listed below:      therapeutic exercises to develop strength, endurance, ROM, flexibility, posture, and core stabilization for 5 minutes including:  Recumbent Bike x 6' -- tolerable ROM or semi circles for circulation, ROM, edema mgmt  Heel prop x 2'  Supine heel slides x 10" holds x 10 - with knee glide today  Hooklying add ball squeeze 5" x 30    manual therapy techniques: Joint mobilizations, Manual traction, Myofacial release, and Soft tissue Mobilization were applied to the: R knee for 15 minutes, including:  Patellar glides all directions x 3x30" Gr II-III  Application of kineseotape to R knee, I strip to medial joint line for decompression and pain relief, also Y strip for patellar lift/support, star technique for posterior knee edema mgmt. Pt educated on wear and removal    neuromuscular re-education activities to improve: Balance, Coordination, Kinesthetic, Sense, Proprioception, and Posture for 30 minutes. The following activities were included:    QS x3x10 x 5" holds - no towel today   QS into SLR x3x10 - VC maintain TKE  SL hip abd x3x10   SL hip add " "(with bolster) 2 x 10, 1x7 (increased symptoms during last set)   SAQ with ball squeeze x3x10   Stool crawls x 15 ft - poor tolerance w/ "soreness" sx   +prone hip ext -- attempted up UA 2* increased pain  +prone quad sets x3x10 x 5" holds    Consider adding next visit: recumbent bike (tolerable ROM or semi circles for circulation, ROM, edema mgmt), clams, LAQ, seated HS curls (or isometrics in HL if poor tolerance), LLB over PB if poor tolerance with HL bridge    therapeutic activities to improve functional performance for 10 minutes, including:  Shuttle DL x2x10 x 1 black cord  Shuttle SL x 2x10 x 0 cords      Ice pack x 10 min to R knee    Patient Education and Home Exercises       Education provided:   - none today    Home Exercises Provided: Patient instructed to cont prior HEP. Exercises were reviewed and Teodora was able to demonstrate them prior to the end of the session.  Teodora demonstrated good  understanding of the education provided. See EMR under Patient Instructions for exercises provided during therapy sessions    Assessment     Resumed heel slides and shuttle today for continued ROM and quad activation. Fair tolerance overall; added prone quad set with pt noting good tolerance w/o increased pain. Resumed kinesiotaping today with added taping over patella and posterior knee for swelling, patellar tracking.  Teodora Is is making good progress towards meeting her goals.     Patient prognosis: Good.   Rehab potential: good      Pt will continue to benefit from skilled Physical Therapy interventions in order to address the deficits listed in the problem list box on initial evaluation, provide education, and to address the musculoskeletal limitations and work-related functional deficits for their job as a .    Pt's spiritual, cultural and educational needs considered and pt agreeable to plan of care and goals.     Anticipated barriers to physical therapy: fear of re-injury and self-limiting behaviors " due to pain     Goals: updated 10/15/2024   Short Terms Goals: 2 weeks     Goal  Progress  Date    (1)  Patient will be I with HOME EXERCISE PROGRAM  Initial, Not Met 10/15/2024     (2)  Patient will  demonstrate AROM of R knee improved to WFL/equal to L  Initial, Not Met   10/15/2024    (3)   Patient will report improved tolerance to sitting at computer up to 1 hour  Initial, Not Met   10/15/2024       Long Term Goals: 4 weeks     Goal  Progress  Date    (1)  Patient will report ability to ambulate up to 30 min to return to [facility] with community mobility Initial, Not Met  10/15/2024     (2)   Patient will report ability to sit at computer as needed with minimal symptom exacerbation  Initial, Not Met  10/15/2024    (3)   Patient will demonstrate B LE strength 4+/5 to improve ability with stair navigation and mobility for work tasks.   Initial, Not Met   10/15/2024        Plan     Cont with POC for mobility, strength, and stability of the R knee with ADLs, work tasks.     Nancy Wilkins, PT   10/15/2024

## 2024-10-15 ENCOUNTER — IMMUNIZATION (OUTPATIENT)
Dept: INTERNAL MEDICINE | Facility: CLINIC | Age: 64
End: 2024-10-15
Payer: COMMERCIAL

## 2024-10-15 DIAGNOSIS — Z23 NEED FOR VACCINATION: Primary | ICD-10-CM

## 2024-10-15 PROCEDURE — 91320 SARSCV2 VAC 30MCG TRS-SUC IM: CPT | Mod: S$GLB,,, | Performed by: INTERNAL MEDICINE

## 2024-10-15 PROCEDURE — 90656 IIV3 VACC NO PRSV 0.5 ML IM: CPT | Mod: S$GLB,,, | Performed by: INTERNAL MEDICINE

## 2024-10-15 PROCEDURE — 90471 IMMUNIZATION ADMIN: CPT | Mod: S$GLB,,, | Performed by: INTERNAL MEDICINE

## 2024-10-15 PROCEDURE — 90480 ADMN SARSCOV2 VAC 1/ONLY CMP: CPT | Mod: S$GLB,,, | Performed by: INTERNAL MEDICINE

## 2024-10-18 ENCOUNTER — CLINICAL SUPPORT (OUTPATIENT)
Dept: REHABILITATION | Facility: OTHER | Age: 64
End: 2024-10-18
Payer: COMMERCIAL

## 2024-10-18 ENCOUNTER — PATIENT MESSAGE (OUTPATIENT)
Dept: REHABILITATION | Facility: OTHER | Age: 64
End: 2024-10-18
Payer: COMMERCIAL

## 2024-10-18 DIAGNOSIS — R26.89 ANTALGIC GAIT: ICD-10-CM

## 2024-10-18 DIAGNOSIS — M25.561 ACUTE PAIN OF RIGHT KNEE: Primary | ICD-10-CM

## 2024-10-18 DIAGNOSIS — Z74.09 IMPAIRED FUNCTIONAL MOBILITY AND ACTIVITY TOLERANCE: ICD-10-CM

## 2024-10-18 PROCEDURE — 97112 NEUROMUSCULAR REEDUCATION: CPT | Mod: PN

## 2024-10-18 PROCEDURE — 97140 MANUAL THERAPY 1/> REGIONS: CPT | Mod: PN

## 2024-10-18 PROCEDURE — 97530 THERAPEUTIC ACTIVITIES: CPT | Mod: PN

## 2024-10-18 NOTE — PROGRESS NOTES
"[facility]   Physical Therapy Treatment Note      Name: Teodora Yuen  Clinic Number: [id number]    Therapy Diagnosis:   Encounter Diagnoses   Name Primary?    Acute pain of right knee Yes    Antalgic gait     Impaired functional mobility and activity tolerance      Physician: Jordyn Rojas PA-C    Visit Date: 10/18/2024    Physician Orders: PT Eval and Treat   Medical Diagnosis from Referral: S83.206A (ICD-10-CM) - Traumatic tear of meniscus of knee, right, initial encounter  Evaluation Date: 9/23/2024  Authorization Period Expiration: 9/16/2025  Plan of Care Expiration: 10/23/2024  Visit # / Visits authorized: 5/ 12   (# of No Show Appts 0/Number of Cancelled Appts 0)    Foto  Date  Score    #1/3 9/23/2024 36%   #2/3  10/9/2024  46%   #3/3          Time In: 0700  Time Out: 0800  Total Appointment Time (timed & untimed codes): 60 minutes      Precautions: Standard    Occupation/job title: [employer]  Job demands: prolonged sitting with computer, meeting set up (carrying and putting out materials, make coffee and set up snacks)     Current work restrictions: nothing stated from MD, pt is limited with walking   Previous work status: full time  Current work status: full time  Date last worked (if applicable): n/a, still working       Subjective     Patient reports: felt like the taping helped from previous visit. "I had to spend long hours in the hospital with my mom after she had a cardiac arrest earlier this week. That's why I had to miss my other visits this week, I had to take care of my mom. But while I was in the hospital, I felt like the tape helped."    She was compliant with home exercise program. - noted increased pain with heel slides when doing after all of the other exercises  Response to previous treatment: fair, poor tolerance to stool scoot   Functional change: none    Pain: 7/10   Location: RIGHT knee (posterior, medial, lateral)     Objective      Objective Measures " "updated at progress report unless specified.     10/09/2024  Gait: antalgic pattern with unilateral axillary crutch     Range of Motion:   Knee Left active Left Passive Right Active R passive   Flexion 135 140 118 140 (ERP)   Extension 0 +5 0 +4 (ERP)      Lower Extremity Strength  Right LE   Left LE     Ankle dorsiflexion: 4/5 Ankle dorsiflexion: 5/5   Knee extension: 4/5 Knee extension: 5/5   Knee flexion: 4-/5 Knee flexion: 5/5   Hip flexion: 4-/5 Hip flexion: 4+/5   Hip external rotation 4/5 Hip external rotation 5/5   Hip internal rotation 4-/5 Hip internal rotation 5/5   Hip abduction:  4/5 Hip abduction: 4+/5   Hip adduction: 4-/5 Hip adduction: 5/5   Hip extension: 4-/5 Hip extension 4-/5      Functional Job Specific Testing:   Job Specific Task Job Demands Current Ability   1. Computer use Sitting at desk limited   2.  Meeting set-up Carrying binders, making coffee limited      Treatment     Teodora received the bolded treatments listed below:      therapeutic exercises to develop strength, endurance, ROM, flexibility, posture, and core stabilization for 5 minutes including:  Recumbent Bike x 6' -- tolerable ROM or semi circles for circulation, ROM, edema mgmt  Heel prop x 2'  Supine heel slides x 10" holds x 10 - with knee glide today  Hooklying add ball squeeze 5" x 30    manual therapy techniques: Joint mobilizations, Manual traction, Myofacial release, and Soft tissue Mobilization were applied to the: R knee for 15 minutes, including:  Patellar glides all directions x 3x30" Gr II-III  Application of kineseotape to R knee, I strip to medial joint line for decompression and pain relief, also Y strip for patellar lift/support, star technique for posterior knee edema mgmt. Pt educated on wear and removal    neuromuscular re-education activities to improve: Balance, Coordination, Kinesthetic, Sense, Proprioception, and Posture for 30 minutes. The following activities were included:    QS x3x10 x 5" holds - no " "towel today   QS into SLR x3x10 - VC maintain TKE  SL hip abd x3x10   SL hip add (with bolster) 2 x 10, 1x7 (increased symptoms during last set)   SAQ with ball squeeze x3x10   Stool crawls x 15 ft - poor tolerance w/ "soreness" sx   +prone hip ext -- attempted up UA 2* increased pain  prone quad sets x3x10 x 5" holds    Consider adding next visit: recumbent bike (tolerable ROM or semi circles for circulation, ROM, edema mgmt), clams, LAQ, seated HS curls (or isometrics in HL if poor tolerance), LLB over PB if poor tolerance with HL bridge    therapeutic activities to improve functional performance for 10 minutes, including:  Shuttle DL x2x10 x 1 black cord  Shuttle SL x 2x10 x 0 cords      Ice pack x 10 min to R knee    Patient Education and Home Exercises       Education provided:   - none today    Home Exercises Provided: Patient instructed to cont prior HEP. Exercises were reviewed and Teodora was able to demonstrate them prior to the end of the session.  Teodoar demonstrated good  understanding of the education provided. See EMR under Patient Instructions for exercises provided during therapy sessions    Assessment     Continued pain with TKE and full knee flex ROM, but able to maintain quad activation to limit quad lag during SLR. Resumed taping per pt request for patellar support, stabilization.  Teodora Is is making good progress towards meeting her goals.     Patient prognosis: Good.   Rehab potential: good      Pt will continue to benefit from skilled Physical Therapy interventions in order to address the deficits listed in the problem list box on initial evaluation, provide education, and to address the musculoskeletal limitations and work-related functional deficits for their job as a .    Pt's spiritual, cultural and educational needs considered and pt agreeable to plan of care and goals.     Anticipated barriers to physical therapy: fear of re-injury and self-limiting behaviors due to pain "     Goals: updated 10/21/2024   Short Terms Goals: 2 weeks     Goal  Progress  Date    (1)  Patient will be I with HOME EXERCISE PROGRAM  Initial, Not Met 10/21/2024     (2)  Patient will  demonstrate AROM of R knee improved to WFL/equal to L  Initial, Not Met   10/21/2024    (3)   Patient will report improved tolerance to sitting at computer up to 1 hour  Initial, Not Met   10/21/2024       Long Term Goals: 4 weeks     Goal  Progress  Date    (1)  Patient will report ability to ambulate up to 30 min to return to [facility] with community mobility Initial, Not Met  10/21/2024     (2)   Patient will report ability to sit at computer as needed with minimal symptom exacerbation  Initial, Not Met  10/21/2024    (3)   Patient will demonstrate B LE strength 4+/5 to improve ability with stair navigation and mobility for work tasks.   Initial, Not Met   10/21/2024        Plan     Cont with POC for mobility, strength, and stability of the R knee with ADLs, work tasks.     Nancy Wilkins, PT   10/21/2024

## 2024-10-21 ENCOUNTER — PATIENT MESSAGE (OUTPATIENT)
Dept: ORTHOPEDICS | Facility: CLINIC | Age: 64
End: 2024-10-21
Payer: COMMERCIAL

## 2024-10-21 ENCOUNTER — OFFICE VISIT (OUTPATIENT)
Dept: ORTHOPEDICS | Facility: CLINIC | Age: 64
End: 2024-10-21
Payer: COMMERCIAL

## 2024-10-21 DIAGNOSIS — S83.241A ACUTE MEDIAL MENISCUS TEAR OF RIGHT KNEE, INITIAL ENCOUNTER: Primary | ICD-10-CM

## 2024-10-21 DIAGNOSIS — M17.11 PRIMARY OSTEOARTHRITIS OF RIGHT KNEE: ICD-10-CM

## 2024-10-21 PROCEDURE — 99499 UNLISTED E&M SERVICE: CPT | Mod: S$GLB,,, | Performed by: PHYSICIAN ASSISTANT

## 2024-10-21 PROCEDURE — 99999 PR PBB SHADOW E&M-EST. PATIENT-LVL III: CPT | Mod: PBBFAC,,, | Performed by: PHYSICIAN ASSISTANT

## 2024-10-21 PROCEDURE — 20610 DRAIN/INJ JOINT/BURSA W/O US: CPT | Mod: RT,S$GLB,, | Performed by: PHYSICIAN ASSISTANT

## 2024-10-21 NOTE — PROGRESS NOTES
Teodora Yuen presents to clinic today for the first right knee Euflexxa injection.  There has been no significant change in her medical status since her last visit. No fever, chills, malaise, or unexplained weight change.    Allergies, medications, past medical and surgical history were reviewed.    Exam demonstrates there is no effusion in the  right knee, and the skin is intact.    Diagnosis: right knee osteoarthritis, medial meniscal tear    After time out was performed, verbal consent obtained and patient ID, side, and site were verified, the  right  knee was sterilly prepped in the standard fashion.  A 22-gauge needle was introduced into right knee joint from an flora-lateral site without complication and knee was then injected with 2 ml of Euflexxa.  Sterile dressing was applied.  The patient was instructed to resume activities as tolerated and to call with any problems.     We will see Teodora Yuen back next week for the second injection.

## 2024-10-28 ENCOUNTER — OFFICE VISIT (OUTPATIENT)
Dept: ORTHOPEDICS | Facility: CLINIC | Age: 64
End: 2024-10-28
Payer: COMMERCIAL

## 2024-10-28 DIAGNOSIS — S83.281A ACUTE LATERAL MENISCUS TEAR OF RIGHT KNEE, INITIAL ENCOUNTER: ICD-10-CM

## 2024-10-28 DIAGNOSIS — M17.11 PRIMARY OSTEOARTHRITIS OF RIGHT KNEE: ICD-10-CM

## 2024-10-28 DIAGNOSIS — S83.241A ACUTE MEDIAL MENISCUS TEAR OF RIGHT KNEE, INITIAL ENCOUNTER: Primary | ICD-10-CM

## 2024-10-28 PROCEDURE — 99499 UNLISTED E&M SERVICE: CPT | Mod: S$GLB,,, | Performed by: PHYSICIAN ASSISTANT

## 2024-10-28 PROCEDURE — 99999 PR PBB SHADOW E&M-EST. PATIENT-LVL III: CPT | Mod: PBBFAC,,, | Performed by: PHYSICIAN ASSISTANT

## 2024-10-28 PROCEDURE — 20610 DRAIN/INJ JOINT/BURSA W/O US: CPT | Mod: RT,S$GLB,, | Performed by: PHYSICIAN ASSISTANT

## 2024-10-29 ENCOUNTER — PATIENT MESSAGE (OUTPATIENT)
Dept: ORTHOPEDICS | Facility: CLINIC | Age: 64
End: 2024-10-29
Payer: COMMERCIAL

## 2024-10-30 ENCOUNTER — PATIENT MESSAGE (OUTPATIENT)
Dept: ORTHOPEDICS | Facility: CLINIC | Age: 64
End: 2024-10-30
Payer: COMMERCIAL

## 2024-11-04 ENCOUNTER — OFFICE VISIT (OUTPATIENT)
Dept: ORTHOPEDICS | Facility: CLINIC | Age: 64
End: 2024-11-04
Payer: COMMERCIAL

## 2024-11-04 DIAGNOSIS — S83.241A ACUTE MEDIAL MENISCUS TEAR OF RIGHT KNEE, INITIAL ENCOUNTER: Primary | ICD-10-CM

## 2024-11-04 DIAGNOSIS — S83.281A ACUTE LATERAL MENISCUS TEAR OF RIGHT KNEE, INITIAL ENCOUNTER: ICD-10-CM

## 2024-11-04 DIAGNOSIS — M17.11 PRIMARY OSTEOARTHRITIS OF RIGHT KNEE: ICD-10-CM

## 2024-11-04 PROCEDURE — 99999 PR PBB SHADOW E&M-EST. PATIENT-LVL III: CPT | Mod: PBBFAC,,, | Performed by: PHYSICIAN ASSISTANT

## 2024-11-04 NOTE — PROGRESS NOTES
Teodora Yuen presents to clinic today for the third right knee Euflexxa injection.  There has been no significant change in her medical status since her last visit. No fever, chills, malaise, or unexplained weight change.    Allergies, medications, past medical and surgical history were reviewed.    Exam demonstrates there is no effusion in the  right knee, and the skin is intact.    Diagnosis: right knee osteoarthritis    After time out was performed, verbal consent obtained and patient ID, side, and site were verified, the  right  knee was sterilly prepped in the standard fashion.  A 22-gauge needle was introduced into right knee joint from an flora-lateral site without complication and knee was then injected with 2 ml of Euflexxa.  Sterile dressing was applied.  The patient was instructed to resume activities as tolerated and to call with any problems.     We will see Teodora Yuen back for follow up as needed

## 2024-11-12 ENCOUNTER — PATIENT MESSAGE (OUTPATIENT)
Dept: REHABILITATION | Facility: OTHER | Age: 64
End: 2024-11-12
Payer: COMMERCIAL

## 2024-11-12 ENCOUNTER — HOSPITAL ENCOUNTER (OUTPATIENT)
Dept: RADIOLOGY | Facility: HOSPITAL | Age: 64
Discharge: HOME | End: 2024-11-12
Attending: ORTHOPAEDIC SURGERY
Payer: COMMERCIAL

## 2024-11-12 ENCOUNTER — PATIENT MESSAGE (OUTPATIENT)
Dept: PRIMARY CARE CLINIC | Facility: CLINIC | Age: 64
End: 2024-11-12
Payer: COMMERCIAL

## 2024-11-12 ENCOUNTER — OFFICE VISIT (OUTPATIENT)
Dept: SPORTS MEDICINE | Facility: CLINIC | Age: 64
End: 2024-11-12
Payer: COMMERCIAL

## 2024-11-12 ENCOUNTER — PATIENT MESSAGE (OUTPATIENT)
Dept: SPORTS MEDICINE | Facility: CLINIC | Age: 64
End: 2024-11-12
Payer: COMMERCIAL

## 2024-11-12 VITALS
BODY MASS INDEX: 34.73 KG/M2 | WEIGHT: 208.44 LBS | HEART RATE: 101 BPM | HEIGHT: 65 IN | DIASTOLIC BLOOD PRESSURE: 82 MMHG | SYSTOLIC BLOOD PRESSURE: 135 MMHG

## 2024-11-12 DIAGNOSIS — M94.261 CHONDROMALACIA, RIGHT KNEE: ICD-10-CM

## 2024-11-12 DIAGNOSIS — S83.271A COMPLEX TEAR OF LATERAL MENISCUS OF RIGHT KNEE AS CURRENT INJURY, INITIAL ENCOUNTER: ICD-10-CM

## 2024-11-12 DIAGNOSIS — S83.231A COMPLEX TEAR OF MEDIAL MENISCUS OF RIGHT KNEE, UNSPECIFIED WHETHER OLD OR CURRENT TEAR, INITIAL ENCOUNTER: Primary | ICD-10-CM

## 2024-11-12 DIAGNOSIS — M25.561 RIGHT KNEE PAIN, UNSPECIFIED CHRONICITY: ICD-10-CM

## 2024-11-12 PROCEDURE — 73564 X-RAY EXAM KNEE 4 OR MORE: CPT | Mod: 26,,, | Performed by: RADIOLOGY

## 2024-11-12 PROCEDURE — 99999 PR PBB SHADOW E&M-EST. PATIENT-LVL III: CPT | Mod: PBBFAC,,, | Performed by: ORTHOPAEDIC SURGERY

## 2024-11-12 PROCEDURE — 73564 X-RAY EXAM KNEE 4 OR MORE: CPT | Mod: TC,50

## 2024-11-12 NOTE — PROGRESS NOTES
CC: Right knee pain    64 y.o. Female with a history of acute right knee pain since 8/29/24.  Patient works as a  and reports she tripped while trying to carry several binders at work. She was referred to us by Jordyn Rojas PA-C. She has attempted a CSI, and Euflexxa injection series thus far with mild relief. Her last Euflexxa injection was 11/4/24. She has been in PT for 7 weeks at this point.     She states that the pain is severe and not responding to any conservative care.      She reports that the pain and weakness. It also bothers her at night.    + mechanical symptoms, - instability, + swelling    Is affecting ADLs.  Pain is 6/10 at it's worst.    REVIEW OF SYSTEMS:   Constitution: Negative. Negative for chills, fever and night sweats.   HENT: Negative for congestion and headaches.    Eyes: Negative for blurred vision, left vision loss and right vision loss.   Cardiovascular: Negative for chest pain and syncope.   Respiratory: Negative for cough and shortness of breath.    Endocrine: Negative for polydipsia, polyphagia and polyuria.   Hematologic/Lymphatic: Negative for bleeding problem. Does not bruise/bleed easily.   Skin: Negative for dry skin, itching and rash.   Musculoskeletal: Negative for falls. Positive for right knee pain and  muscle weakness.   Gastrointestinal: Negative for abdominal pain and bowel incontinence.   Genitourinary: Negative for bladder incontinence and nocturia.   Neurological: Negative for disturbances in coordination, loss of balance and seizures.   Psychiatric/Behavioral: Negative for depression. The patient does not have insomnia.    Allergic/Immunologic: Negative for hives and persistent infections.     PAST MEDICAL HISTORY:   Past Medical History:   Diagnosis Date    Adrenal mass, right     GERD (gastroesophageal reflux disease)     Hyperlipidemia        PAST SURGICAL HISTORY:   Past Surgical History:   Procedure Laterality Date    BILE DUCT STENT PLACEMENT       CHOLECYSTECTOMY      FRACTURE SURGERY  2006 & 2010    Ankle, place pin the remove pin    HYSTERECTOMY  1999    KRISTIN with BSO secondary to family hx ovarian ca and endometriosis    R ankle surgery Right     THYROID LOBECTOMY Left 11/24/2021    Procedure: LOBECTOMY, THYROID;  Surgeon: Reyes Harrison MD;  Location: [facility] OR [address] [place] [address];  Service: ENT;  Laterality: Left;    toe fracture Left     4th metatarsal       FAMILY HISTORY:   Family History   Problem Relation Name Age of Onset    Cancer Mother 1940         ovarian    Cancer Maternal Grandmother Elisabeth BLUM         ovarian    Cancer Maternal Grandfather          colon    Diabetes Neg Hx      Heart disease Neg Hx      Hyperlipidemia Neg Hx      Hypertension Neg Hx      Stroke Neg Hx         SOCIAL HISTORY:   Social History     Socioeconomic History    Marital status:     Number of children: 3   Occupational History    Occupation:    Tobacco Use    Smoking status: Never     Passive exposure: Never    Smokeless tobacco: Never   Substance and Sexual Activity    Alcohol use: Yes     Alcohol/week: 1.0 standard drink of alcohol     Types: 1 Glasses of wine per week     Comment: social    Drug use: Never    Sexual activity: Yes     Partners: Male     Birth control/protection: See Surgical Hx     Social Drivers of Health     Financial Resource Strain: Low Risk  (3/19/2024)    Overall Financial Resource Strain (CARDIA)     Difficulty of Paying Living Expenses: Not very hard   Food Insecurity: Food Insecurity Present (3/19/2024)    Hunger Vital Sign     Worried About Running Out of Food in the Last Year: Sometimes true     Ran Out of Food in the Last Year: Patient declined   Transportation Needs: No Transportation Needs (3/19/2024)    PRAPARE - Transportation     Lack of Transportation (Medical): No     Lack of Transportation (Non-Medical): No   Physical Activity: Sufficiently Active (3/19/2024)    Exercise Vital Sign     Days of Exercise per Week:  4 days     Minutes of Exercise per Session: 40 min   Stress: No Stress Concern Present (3/19/2024)    [employer] [place] [employer] - Occupational Stress Questionnaire     Feeling of Stress : Only a little   Housing Stability: Low Risk  (3/19/2024)    Housing Stability Vital Sign     Unable to Pay for Housing in the Last Year: No     Number of Places Lived in the Last Year: 1     Unstable Housing in the Last Year: No       MEDICATIONS:     Current Outpatient Medications:     calcium carbonate-vitamin D3 500 mg(1,250mg) -400 unit Tab, once daily. , Disp: , Rfl:     esomeprazole (NEXIUM) 40 MG capsule, Take 1 capsule (40 mg total) by mouth daily before breakfast., Disp: 90 capsule, Rfl: 3    ferrous gluconate (FERGON) 324 MG tablet, Take 1 tablet (324 mg total) by mouth 3 (three) times daily with meals., Disp: 90 tablet, Rfl: 4    fluticasone propionate (FLONASE) 50 mcg/actuation nasal spray, Use 1 spray (50 mcg total) in each nostril once daily., Disp: 48 g, Rfl: 3    hydrOXYzine HCL (ATARAX) 10 MG Tab, Take 1 tablet (10 mg total) by mouth nightly as needed (nightly as needed)., Disp: 90 tablet, Rfl: 1    ibuprofen (ADVIL,MOTRIN) 600 MG tablet, Take 1 tablet (600 mg total) by mouth every 6 (six) hours as needed for Pain., Disp: 40 tablet, Rfl: 0    naproxen (NAPROSYN) 500 MG tablet, Take 1 tablet (500 mg total) by mouth 2 (two) times daily with meals. Take with meals.  Do not take with any other NSAIDs such as ibuprofen, Toradol, or any medications that contain NSAIDs., Disp: 60 tablet, Rfl: 0    pravastatin (PRAVACHOL) 40 MG tablet, Take 1 tablet (40 mg total) by mouth once daily., Disp: 90 tablet, Rfl: 3    ibuprofen (ADVIL,MOTRIN) 800 MG tablet, Take 1 tablet (800 mg total) by mouth 3 (three) times daily as needed for Pain., Disp: 20 tablet, Rfl: 0    metFORMIN (GLUCOPHAGE-XR) 500 MG ER 24hr tablet, Take 1 tablet (500 mg total) by mouth 2 (two) times daily with meals., Disp: 180 tablet, Rfl: 3     "triamcinolone acetonide 0.1% (KENALOG) 0.1 % ointment, Apply topically 2 (two) times daily., Disp: 80 g, Rfl: 0    ALLERGIES:   Review of patient's allergies indicates:   Allergen Reactions    Coconut Hives       VITAL SIGNS:   /82   Pulse 101   Ht 5' 5" (1.651 m)   Wt 94.6 kg (208 lb 7.1 oz)   BMI 34.69 kg/m²      PHYSICAL EXAMINATION:  General:  The patient is alert and oriented x 3.  Mood is pleasant.  Observation of ears, eyes and nose reveal no gross abnormalities.  No labored breathing observed.    RIGHT KNEE EXAMINATION     OBSERVATION / INSPECTION   Gait:   antalgic   Alignment:  Neutral   Scars:   None   Muscle atrophy: Mild  Effusion:  Mild   Warmth:  None   Discoloration:   none     TENDERNESS / CREPITUS (T / C):          T / C      T / C   Patella   - / -   Lateral joint line   + / -   Peripatellar medial  +  Medial joint line    + / -   Peripatellar lateral +  Medial plica   - / -   Patellar tendon -   Popliteal fossa   - / -   Quad tendon   -   Gastrocnemius   -   Prepatellar Bursa - / -   Quadricep   -   Tibial tubercle  -  Thigh/hamstring  -   Pes anserine/HS -  Fibula    -   ITB   + / -  Tibia     -   Tib/fib joint  - / -  LCL    -     MFC   + / -   MCL: Proximal  +    LFC   + / -    Distal   -          ROM: (* = pain)  PASSIVE   ACTIVE    Left :   5 / 0 / 145   5 / 0 / 145     Right :    5 / 0 / 145   0 / 0 / 110    Patellofemoral examination:  See above noted areas of tenderness.   Patella position    Subluxation / dislocation: Centered           Sup. / Inf;   Normal   Crepitus (PF):    Absent   Patellar Mobility:       Medial-lateral:   Normal    Superior-inferior:  Normal    Inferior tilt   Normal    Patellar tendon:  Normal   Lateral tilt:    Normal   J-sign:     None   Patellofemoral grind:   No pain       MENISCAL SIGNS:     Pain on terminal extension:  +  Pain on terminal flexion:  +  Jeffs maneuver:  + (for pain and clicking)  Squat     Unable to perform    LIGAMENT " EXAMINATION:  ACL / Lachman:  normal (-1 to 2mm)    PCL-Post.  drawer: normal 0 to 2mm  MCL- Valgus:  normal 0 to 2mm  LCL- Varus:  normal 0 to 2mm  Pivot shift: normal (Equal)   Dial Test: difference c/w other side   At 30° flexion: normal (< 5°)    At 90° flexion: normal (< 5°)   Reverse Pivot Shift:   normal (Equal)     STRENGTH: (* = with pain) PAINFUL SIDE   Quadricep   4+/5   Hamstrin/5    EXTREMITY NEURO-VASCULAR EXAMINATION:   Sensation:  Grossly intact to light touch all dermatomal regions.   Motor Function:  Fully intact motor function at hip, knee, foot and ankle    DTRs;  quadriceps and  achilles 2+.  No clonus and downgoing Babinski.    Vascular status:  DP and PT pulses 2+, brisk capillary refill, symmetric.       IMAGING:    X-rays including standing, weight bearing AP and flexion bilateral knees, lateral and merchant views ordered and images reviewed by me show:    No fracture, dislocation or other pathology   Medial compartment: no degenerative changes   Lateral compartment: no degenerative changes   Patellofemoral compartment: no degenerative changes        ASSESSMENT:    Right Knee Pain,   - Medial and lateral meniscus tears  - Tricompartmental osteoarthritic changes      PLAN:   1. Right knee arthroscopy with medial and lateral menisectomy and chondroplasty. Patient will need PCP clearance.       All questions were answered, pt will contact us for questions or concerns in the interim.

## 2024-11-14 RX ORDER — IBUPROFEN 600 MG/1
600 TABLET ORAL EVERY 6 HOURS PRN
Qty: 40 TABLET | Refills: 0 | Status: SHIPPED | OUTPATIENT
Start: 2024-11-14

## 2024-11-20 ENCOUNTER — PATIENT MESSAGE (OUTPATIENT)
Dept: SPORTS MEDICINE | Facility: CLINIC | Age: 64
End: 2024-11-20
Payer: COMMERCIAL

## 2024-11-21 PROBLEM — S83.231A COMPLEX TEAR OF MEDIAL MENISCUS OF RIGHT KNEE: Status: ACTIVE | Noted: 2024-11-21

## 2024-11-22 ENCOUNTER — OFFICE VISIT (OUTPATIENT)
Dept: INTERNAL MEDICINE | Facility: CLINIC | Age: 64
End: 2024-11-22
Payer: COMMERCIAL

## 2024-11-22 ENCOUNTER — LAB VISIT (OUTPATIENT)
Dept: LAB | Facility: HOSPITAL | Age: 64
End: 2024-11-22
Payer: COMMERCIAL

## 2024-11-22 ENCOUNTER — PATIENT MESSAGE (OUTPATIENT)
Dept: PREADMISSION TESTING | Facility: HOSPITAL | Age: 64
End: 2024-11-22
Payer: COMMERCIAL

## 2024-11-22 VITALS
WEIGHT: 209.44 LBS | BODY MASS INDEX: 34.89 KG/M2 | HEIGHT: 65 IN | OXYGEN SATURATION: 97 % | SYSTOLIC BLOOD PRESSURE: 144 MMHG | HEART RATE: 79 BPM | TEMPERATURE: 99 F | DIASTOLIC BLOOD PRESSURE: 85 MMHG

## 2024-11-22 DIAGNOSIS — E66.811 CLASS 1 OBESITY WITH SERIOUS COMORBIDITY AND BODY MASS INDEX (BMI) OF 34.0 TO 34.9 IN ADULT, UNSPECIFIED OBESITY TYPE: ICD-10-CM

## 2024-11-22 DIAGNOSIS — R73.03 PREDIABETES: ICD-10-CM

## 2024-11-22 DIAGNOSIS — M94.261 CHONDROMALACIA, RIGHT KNEE: ICD-10-CM

## 2024-11-22 DIAGNOSIS — D35.01 ADENOMA OF RIGHT ADRENAL GLAND: ICD-10-CM

## 2024-11-22 DIAGNOSIS — Z01.818 PREOPERATIVE EXAMINATION: ICD-10-CM

## 2024-11-22 DIAGNOSIS — S83.271A COMPLEX TEAR OF LATERAL MENISCUS OF RIGHT KNEE AS CURRENT INJURY, INITIAL ENCOUNTER: ICD-10-CM

## 2024-11-22 DIAGNOSIS — K21.9 GASTROESOPHAGEAL REFLUX DISEASE, UNSPECIFIED WHETHER ESOPHAGITIS PRESENT: ICD-10-CM

## 2024-11-22 DIAGNOSIS — S83.231A COMPLEX TEAR OF MEDIAL MENISCUS OF RIGHT KNEE, UNSPECIFIED WHETHER OLD OR CURRENT TEAR, INITIAL ENCOUNTER: Primary | ICD-10-CM

## 2024-11-22 DIAGNOSIS — E78.5 HYPERLIPIDEMIA, UNSPECIFIED HYPERLIPIDEMIA TYPE: ICD-10-CM

## 2024-11-22 DIAGNOSIS — S83.231S COMPLEX TEAR OF MEDIAL MENISCUS OF RIGHT KNEE, UNSPECIFIED WHETHER OLD OR CURRENT TEAR, SEQUELA: ICD-10-CM

## 2024-11-22 DIAGNOSIS — Z01.818 PREOPERATIVE EXAMINATION: Primary | ICD-10-CM

## 2024-11-22 LAB
ALBUMIN SERPL BCP-MCNC: 3.7 G/DL (ref 3.5–5.2)
ALP SERPL-CCNC: 108 U/L (ref 40–150)
ALT SERPL W/O P-5'-P-CCNC: 20 U/L (ref 10–44)
ANION GAP SERPL CALC-SCNC: 8 MMOL/L (ref 8–16)
AST SERPL-CCNC: 17 U/L (ref 10–40)
BILIRUB SERPL-MCNC: 0.3 MG/DL (ref 0.1–1)
BUN SERPL-MCNC: 11 MG/DL (ref 8–23)
CALCIUM SERPL-MCNC: 9.1 MG/DL (ref 8.7–10.5)
CHLORIDE SERPL-SCNC: 106 MMOL/L (ref 95–110)
CO2 SERPL-SCNC: 26 MMOL/L (ref 23–29)
CREAT SERPL-MCNC: 0.7 MG/DL (ref 0.5–1.4)
ERYTHROCYTE [DISTWIDTH] IN BLOOD BY AUTOMATED COUNT: 14.7 % (ref 11.5–14.5)
EST. GFR  (NO RACE VARIABLE): >60 ML/MIN/1.73 M^2
ESTIMATED AVG GLUCOSE: 140 MG/DL (ref 68–131)
GLUCOSE SERPL-MCNC: 103 MG/DL (ref 70–110)
HBA1C MFR BLD: 6.5 % (ref 4–5.6)
HCT VFR BLD AUTO: 40.5 % (ref 37–48.5)
HGB BLD-MCNC: 13 G/DL (ref 12–16)
MCH RBC QN AUTO: 26.5 PG (ref 27–31)
MCHC RBC AUTO-ENTMCNC: 32.1 G/DL (ref 32–36)
MCV RBC AUTO: 83 FL (ref 82–98)
PLATELET # BLD AUTO: 315 K/UL (ref 150–450)
PMV BLD AUTO: 10.7 FL (ref 9.2–12.9)
POTASSIUM SERPL-SCNC: 3.8 MMOL/L (ref 3.5–5.1)
PROT SERPL-MCNC: 7.6 G/DL (ref 6–8.4)
RBC # BLD AUTO: 4.9 M/UL (ref 4–5.4)
SODIUM SERPL-SCNC: 140 MMOL/L (ref 136–145)
WBC # BLD AUTO: 10.34 K/UL (ref 3.9–12.7)

## 2024-11-22 PROCEDURE — 83036 HEMOGLOBIN GLYCOSYLATED A1C: CPT | Performed by: NURSE PRACTITIONER

## 2024-11-22 PROCEDURE — 80053 COMPREHEN METABOLIC PANEL: CPT | Performed by: NURSE PRACTITIONER

## 2024-11-22 PROCEDURE — 85027 COMPLETE CBC AUTOMATED: CPT | Performed by: NURSE PRACTITIONER

## 2024-11-22 PROCEDURE — 99999 PR PBB SHADOW E&M-EST. PATIENT-LVL IV: CPT | Mod: PBBFAC,,, | Performed by: NURSE PRACTITIONER

## 2024-11-22 PROCEDURE — 36415 COLL VENOUS BLD VENIPUNCTURE: CPT | Performed by: NURSE PRACTITIONER

## 2024-11-22 NOTE — DISCHARGE INSTRUCTIONS
Your surgery has been scheduled for:_____12/4/24_____________________________________    You should report to:  ____[facility], located on the [place] side of the first floor of the           [facility] ([phone])  ____[facility], located on the [facility] side of the            Second floor of the [facility] ([phone])  ____3rd Floor [facility] located on the [facility] side of the [facility] [phone]  _X___[facility]: located at [address] [facility] [place] [address]. Building [address].     Please Note   Tell your doctor if you take Aspirin, products containing Aspirin, herbal medications  or blood thinners, such as Coumadin, Ticlid, or Plavix.  (Consult your provider regarding holding or stopping before surgery).  Arrange for someone to drive you home following surgery.  You will not be allowed to leave the surgical facility alone or drive yourself home following sedation and anesthesia.    Before Surgery  Stop taking all herbal medications, vitamins, and supplements 7 days prior to surgery  No Motrin/Advil (Ibuprofen) 7 days before surgery  No Aleve (Naproxen) 7 days before surgery   No Goody's/BC Powder 7 days before surgery  Refrain from drinking alcoholic beverages for 24 hours before and after surgery  Stop or limit smoking at least 24 hours prior to surgery  You may take Tylenol for pain    Night before Surgery  Do not eat or drink after midnight  Take a shower or bath (shower is recommended).  Bathe with Hibiclens soap or an antibacterial soap from the neck down.  If not supplied by your surgeon, hibiclens soap will need to be purchased over the counter in pharmacy.  Rinse soap off thoroughly.  Shampoo your hair with your regular shampoo    The Day of Surgery  Take another bath or shower with hibiclens or any antibacterial soap, to reduce the chance of infection.  Take heart  and blood pressure medications with a small sip of water, as advised by the perioperative team.  Do not take fluid pills  You may brush your teeth and rinse your mouth, but do not swallow any additional water.   Do not apply perfumes, powder, body lotions or deodorant on the day of surgery.  Nail polish should be removed.  Do not wear makeup or moisturizer  Wear comfortable clothes, such as a button front shirt and loose fitting pants.  Leave all jewelry, including body piercings, and valuables at home.    Bring any devices you will need after surgery such as crutches or canes.  If you have sleep apnea, please bring your CPAP machine  In the event that your physical condition changes including the onset of a cold or respiratory illness, or if you have to delay or cancel your surgery, please notify your surgeon.

## 2024-11-22 NOTE — PROGRESS NOTES
[facility] [address]  Progress Note    Patient Name: Teodora Yuen  MRN: [id number]  Date of Evaluation- 11/22/2024  PCP- Tere Brown MD    Future cases for Teodora Yuen [[id number]]       Case ID Status Date Time Arsen Procedure Provider Location    [id number] [facility] 12/4/2024 10:00  ARTHROSCOPY, KNEE, WITH MEDIAL OR LATERAL MENISCECTOMY Joel Glover MD [[id number]] [facility]            HPI:  This is a 64 y.o. female  who presents today for a preoperative evaluation in preparation for right knee arthroscopy.  Surgery is indicated for  complex tear of medial meniscus of right knee.   Patient is new to me.  The history has been obtained by speaking with the patient and reviewing the electronic medical record and/or outside health information. Significant health conditions for the perioperative period are discussed below in assessment and plan.   Patient reports current health status to be Fair.  Denies any new symptoms before surgery.       Subjective/ Objective:     Chief Complaint: Preoperative evaulation, perioperative medical management, and complication reduction plan.     Functional Capacity: no current regular exercise regimen; was walking 17 blocks to work without CP/SOB.       Anesthesia issues: None    Difficulty mouth opening: No    Steroid use in the last 12 months:  right knee    Dental Issues: None    Family anesthesia difficulty: None       Family Hx of Thrombosis: None    Past Medical History:   Diagnosis Date    Adrenal mass, right     GERD (gastroesophageal reflux disease)     Hyperlipidemia          Past Medical History Pertinent Negatives:   Diagnosis Date Noted    Anxiety 11/22/2024    Asthma 11/22/2024    Cancer 12/20/2022    COPD (chronic obstructive pulmonary disease) 11/22/2024    Coronary artery disease 11/22/2024    Deep vein thrombosis 11/22/2024    Depression 11/22/2024    Diabetes mellitus, type 2 11/22/2024    Disorder of kidney and ureter 11/22/2024    Hypertension 11/22/2024  "   Myocardial infarction 11/22/2024    Pulmonary embolism 11/22/2024    Seizures 11/22/2024    Stroke 11/22/2024         Past Surgical History:   Procedure Laterality Date    BILE DUCT STENT PLACEMENT      CHOLECYSTECTOMY      FRACTURE SURGERY  2006 & 2010    Ankle, place pin the remove pin    HYSTERECTOMY  1999    KRISTIN with BSO secondary to family hx ovarian ca and endometriosis    R ankle surgery Right     THYROID LOBECTOMY Left 11/24/2021    Procedure: LOBECTOMY, THYROID;  Surgeon: Reyes Harrison MD;  Location: [facility] OR [address] [place] [address];  Service: ENT;  Laterality: Left;    toe fracture Left     4th metatarsal       Review of Systems   Constitutional:  Negative for chills, fatigue, fever and unexpected weight change.   HENT:  Negative for congestion, hearing loss, rhinorrhea, sore throat, tinnitus and trouble swallowing.    Eyes:  Negative for visual disturbance.   Respiratory:  Negative for cough, chest tightness, shortness of breath and wheezing.    Cardiovascular:  Negative for chest pain, palpitations and leg swelling.   Gastrointestinal:  Negative for constipation and diarrhea.        Denies Fatty liver, Hepatitis   Genitourinary:  Negative for decreased urine volume, difficulty urinating, dysuria, frequency, hematuria and urgency.   Musculoskeletal:  Positive for arthralgias (right knee) and joint swelling (right knee). Negative for back pain, neck pain and neck stiffness.   Skin:  Negative for rash and wound.   Allergic/Immunologic: Positive for environmental allergies.   Neurological:  Negative for dizziness, syncope, weakness, numbness and headaches.   Hematological:  Bruises/bleeds easily.   Psychiatric/Behavioral:  Negative for sleep disturbance and suicidal ideas.               VITALS  Visit Vitals  BP (!) 144/85 (BP Location: Left arm, Patient Position: Sitting)   Pulse 79   Temp 99 °F (37.2 °C) (Oral)   Ht 5' 5" (1.651 m)   Wt 95 kg (209 lb 7 oz)   SpO2 97%   BMI 34.85 kg/m²          Physical " Exam  Vitals reviewed.   Constitutional:       General: She is not in acute distress.     Appearance: She is well-developed. She is obese.   HENT:      Head: Normocephalic.      Nose: Nose normal.      Mouth/Throat:      Pharynx: No oropharyngeal exudate.   Eyes:      General:         Right eye: No discharge.         Left eye: No discharge.      Conjunctiva/sclera: Conjunctivae normal.      Pupils: Pupils are equal, round, and reactive to light.   Neck:      Thyroid: No thyromegaly.      Vascular: No carotid bruit or JVD.      Trachea: No tracheal deviation.   Cardiovascular:      Rate and Rhythm: Normal rate and regular rhythm.      Pulses:           Carotid pulses are 2+ on the right side and 2+ on the left side.       Dorsalis pedis pulses are 2+ on the right side and 2+ on the left side.        Posterior tibial pulses are 2+ on the right side and 2+ on the left side.      Heart sounds: Normal heart sounds. No murmur heard.  Pulmonary:      Effort: Pulmonary effort is normal. No respiratory distress.      Breath sounds: Normal breath sounds. No stridor. No wheezing, rhonchi or rales.   Abdominal:      General: Bowel sounds are normal. There is no distension.      Tenderness: There is no abdominal tenderness. There is no guarding.   Musculoskeletal:      Cervical back: Normal range of motion. No pain with movement.      Right lower leg: No edema.      Left lower leg: Edema (trace) present.   Lymphadenopathy:      Cervical: No cervical adenopathy.   Skin:     General: Skin is warm and dry.      Capillary Refill: Capillary refill takes less than 2 seconds.      Findings: No erythema or rash.   Neurological:      Mental Status: She is alert and oriented to person, place, and time.   Psychiatric:         Behavior: Behavior normal. Behavior is cooperative.          Significant Labs:  Lab Results   Component Value Date    WBC 7.06 12/16/2023    HGB 10.8 (L) 12/16/2023    HCT 35.5 (L) 12/16/2023     12/16/2023     CHOL 179 12/16/2023    TRIG 115 12/16/2023    HDL 48 12/16/2023    ALT 15 12/16/2023    ALT 15 12/16/2023    AST 17 12/16/2023    AST 17 12/16/2023     12/16/2023     12/16/2023    K 4.0 12/16/2023    K 4.0 12/16/2023     12/16/2023     12/16/2023    CREATININE 0.8 12/16/2023    CREATININE 0.8 12/16/2023    BUN 14 12/16/2023    BUN 14 12/16/2023    CO2 24 12/16/2023    CO2 24 12/16/2023    TSH 2.390 12/16/2023    HGBA1C 6.4 (H) 12/16/2023    HGBA1C 6.4 (H) 12/16/2023             Active Cardiac Conditions: None      Revised Cardiac Risk Index   High -Risk Surgery  Intraperitoneal; Intrathoracic; suprainguinal vascular Yes- + 1 No- 0   History of Ischemic Heart Disease   (Hx of MI/positive exercise test/current chest pain due to ischemia/use of nitrate therapy/EKG with pathological Q waves) Yes- + 1 No- 0   History of CHF  (Pulmonary edema/bilateral rales or S3 gallop/PND/CXR showing pulmonary vascular redistribution) Yes- + 1 No- 0   History of CVA   (Prior stroke or TIA) Yes- + 1 No- 0   Pre-operative treatment with insulin Yes- + 1 No- 0   Pre-operative creatinine > 2mg/dl Yes- + 1 No- 0   Total: 0      Risk Status:  Estimated risk of cardiac complications after non-cardiac surgery using the Revised Cardiac Risk Index for Preoperative risk is 3.9 %      ARISCAT (Canet) risk index: Low: 1.6% risk of post-op pulmonary complications; Intermediate: 13.3% risk of post-op pulmonary complications if surgery is > 3 hours.     American Society of Anesthesiologists Physical Status classification (ASA): 2                   Orders Placed This Encounter    Comprehensive Metabolic Panel    CBC Without Differential    Hemoglobin A1C           Assessment/Plan:     Complex tear of medial meniscus of right knee  Scheduled with Dr. Glover on 12/4/24 for right knee arthroscopy.    Hyperlipidemia  Recommend weight loss, healthy diet (DASH/Mediterranean) and exercise.   Patient should exercise 30 minutes at  least five times weekly once recovered from surgery. Limit alcohol.  Treated with: pravastatin    Adenoma of right adrenal gland  History since 2014 and has been stable and has had negative workup outside of [facility]  Findings consistent with benign adenoma per Endocrinology note in 2021  Denies: elevated blood pressure/excessive sweating/tachycardia/CP/SOB/headaches      Prediabetes  A1c is   I recommend monitoring patient's glucose level during the perioperative period. Glucose may be elevated from stress hyperglycemia and may require insulin.    GERD (gastroesophageal reflux disease)  Currently being treated with Nexium; controlled.    Class 1 obesity with serious comorbidity and body mass index (BMI) of 34.0 to 34.9 in adult  Patient would benefit from weight loss and has not set goals to achieve success.   Lifestyle changes should be made by eating healthy, exercising at least 150 minutes weekly, and avoiding sedentary behavior.       Discussion/Management of Perioperative Care    Thromboembolic prophylaxis (VTE) Care: Risk factors for thrombosis include: obesity and surgical procedure.  I recommend prophylaxis of thromboembolism with the use of compression stockings/pneumatic devices, and/or pharmacologic agents. The benefits should outweigh the risks for pharmacologic prophylaxis in the perioperative period. I also encourage early ambulation if not contraindicated during the post-operative period.    To reduce the risk of pulmonary complications, prophylactic recommendations include: incentive spirometry use/deep breathing, end tidal carbon dioxide monitoring, and pain control.    To reduce the risk of postoperative renal complications, I recommend the patient maintain adequate hydration.  Avoid/reduce NSAIDS and HERBERT-2 inhibitors use as well as IV contrast for renal protection.    I recommend the use of appropriate prophylactic antibiotics to reduce the risk of surgical site infections.        This visit was  focused on Preoperative evaluation, Perioperative Medical management, complication reduction plans. I suggest that the patient follows up with primary care or relevant sub specialists for ongoing health care.    I appreciate the opportunity to be involved in this patients care. Please feel free to contact me if there were any questions about this consultation.      I spent a total of 30 minutes on the day of the visit.  This includes face to face time and non-face to face time preparing to see the patient (e.g., review of tests), obtaining and/or reviewing separately obtained history, documenting clinical information in the electronic or other health record, independently interpreting results and communicating results to the patient/family/caregiver, or care coordinator.       Patient is pending optimization labs      Flor Ervin NP  Perioperative Medicine  [facility]

## 2024-11-22 NOTE — ASSESSMENT & PLAN NOTE
A1c is   I recommend monitoring patient's glucose level during the perioperative period. Glucose may be elevated from stress hyperglycemia and may require insulin.

## 2024-11-22 NOTE — ANESTHESIA PAT ROS NOTE
11/22/2024  Teodora Yuen is a 64 y.o., female.      Pre-op Assessment    I have reviewed the Patient Summary Reports.       I have reviewed the Medications.     Review of Systems  Anesthesia Hx:  No problems with previous Anesthesia   History of prior surgery of interest to airway management or planning:  Previous anesthesia: General 11/24/2021  Left Thyroid Lobectomy with general anesthesia.  Procedure performed at an [facility].      Airway issues documented on chart review include mask, easy, GETA, videolaryngoscope used  , view on video-laryngoscopy Grade I      Denies Personal Hx of Anesthesia complications.                    Social:  Non-Smoker, Social Alcohol Use 1 glass of wine per week      Hematology/Oncology:    Oncology Normal    -- Anemia:               Hematology Comments: low ferritin/iron  Vit D deficiency,                      EENT/Dental:   environmental allergies  H/O Thyroid nodules, S/P Thyroid Lobectomy- left          Cardiovascular:  Exercise tolerance: good       Denies CAD.       Denies Angina.     hyperlipidemia  Denies BECKETT.    Patient not on beta blockers                          Pulmonary:  Pulmonary Normal   Denies COPD.  Denies Asthma.   Denies Shortness of breath.                  Renal/:   renal calculi               Hepatic/GI:   PUD, Hiatal Hernia, GERD Denies Liver Disease.  H/O Cholecystectomy,   Bile duct stent placement,  H/O diverticulitis Not Taking GLP-1 Agonists            Musculoskeletal:     Complex tear of medial meniscus of right knee,   Chondromalacia, right knee,  Complex tear of lateral meniscus of right knee,  H/O ORIF right ankle, Removal of hardware,  Left toe fracture,  Osteopenia                OB/GYN/PEDS:  Hysterectomy, KRISTIN w/ BSO 2/2 family hx of ovarian cancer and Endometriosis           Neurological:    Denies CVA.   Headaches Denies  Seizures.                                Endocrine:   Denies Hypothyroidism.  Right adrenal mass, stable,  (benign adenoma per Endocrinology note in 2021),   Denies: elevated blood pressure/excessive sweating/tachycardia/CP/SOB/headaches,  Prediabetes,  HA1C = 6.5  on 11/22/2024      Obesity / BMI > 30  Dermatological:   Herpes Zoster (Shingles) in 10/2023   Psych:  Psychiatric Normal                   Past Medical History:   Diagnosis Date    Adrenal mass, right     GERD (gastroesophageal reflux disease)     Hyperlipidemia      Past Surgical History:   Procedure Laterality Date    BILE DUCT STENT PLACEMENT      CHOLECYSTECTOMY      FRACTURE SURGERY  2006 & 2010    Ankle, place pin the remove pin    HYSTERECTOMY  1999    KRISTIN with BSO secondary to family hx ovarian ca and endometriosis    R ankle surgery Right     THYROID LOBECTOMY Left 11/24/2021    Procedure: LOBECTOMY, THYROID;  Surgeon: Reyes Harrison MD;  Location: [facility] OR [address] [place] [address];  Service: ENT;  Laterality: Left;    toe fracture Left     4th metatarsal       Anesthesia Assessment: Preoperative EQUATION    Planned Procedure: Procedure(s) (LRB):  ARTHROSCOPY, KNEE, WITH MEDIAL OR LATERAL MENISCECTOMY (Right)  ARTHROSCOPY, KNEE, WITH CHONDROPLASTY (Right)  Requested Anesthesia Type:General  Surgeon: Joel Glover MD  Service: Orthopedics  Known or anticipated Date of Surgery:12/4/2024    Surgeon notes: reviewed    Electronic QUestionnaire Assessment completed via nurse interview with patient.        Triage considerations:     The patient has no apparent active cardiac condition (No unstable coronary Syndrome such as severe unstable angina or recent [<1 month] myocardial infarction, decompensated CHF, severe valvular   disease or significant arrhythmia)    Previous anesthesia records:GETA, Easy airway, Easy intubation, No problems, and video laryngoscopy, [place] 3    Last PCP note: 6-12 months ago , within [facility]   Subspecialty notes:  Endocrinology, Ortho    Other important co-morbidities: GERD, HLD, and Obesity      Tests already available:  Results have been reviewed.             Instructions given. (See in Nurse's note) Preop instructions sent via portal message. Also received in [facility]op [facility].    Optimization:  Anesthesia Preop Clinic Assessment Not Indicated    Medical Opinion Indicated: Yes, Preop Center NP       Sub-specialist consult indicated: TBD       Plan:  Consultation:IM Perioperative Hospitalist/ NP              Patient  has previously scheduled Medical Appointment:11/22/2024    Navigation: Tests Scheduled.              Consults scheduled.             Results will be tracked by Preop Clinic.                        Patient is optimized for surgery by Preop Center NP.       Ht: 5'5  Wt: 95 kg (209 lb)  BMI: 34.85

## 2024-11-22 NOTE — ASSESSMENT & PLAN NOTE
Recommend weight loss, healthy diet (DASH/Mediterranean) and exercise.   Patient should exercise 30 minutes at least five times weekly once recovered from surgery. Limit alcohol.  Treated with: pravastatin

## 2024-11-22 NOTE — ASSESSMENT & PLAN NOTE
History since 2014 and has been stable and has had negative workup outside of [facility]  Findings consistent with benign adenoma per Endocrinology note in 2021  Denies: elevated blood pressure/excessive sweating/tachycardia/CP/SOB/headaches

## 2024-11-22 NOTE — HPI
This is a 64 y.o. female  who presents today for a preoperative evaluation in preparation for right knee arthroscopy.  Surgery is indicated for  complex tear of medial meniscus of right knee.   Patient is new to me.  The history has been obtained by speaking with the patient and reviewing the electronic medical record and/or outside health information. Significant health conditions for the perioperative period are discussed below in assessment and plan.   Patient reports current health status to be Fair.  Denies any new symptoms before surgery.

## 2024-11-22 NOTE — OUTPATIENT SUBJECTIVE & OBJECTIVE
Outpatient Subjective & Objective      Chief Complaint: Preoperative evaulation, perioperative medical management, and complication reduction plan.     Functional Capacity: no current regular exercise regimen; was walking 17 blocks to work without CP/SOB.       Anesthesia issues: None    Difficulty mouth opening: No    Steroid use in the last 12 months:  right knee    Dental Issues: None    Family anesthesia difficulty: None       Family Hx of Thrombosis: None    Past Medical History:   Diagnosis Date    Adrenal mass, right     GERD (gastroesophageal reflux disease)     Hyperlipidemia          Past Medical History Pertinent Negatives:   Diagnosis Date Noted    Anxiety 11/22/2024    Asthma 11/22/2024    Cancer 12/20/2022    COPD (chronic obstructive pulmonary disease) 11/22/2024    Coronary artery disease 11/22/2024    Deep vein thrombosis 11/22/2024    Depression 11/22/2024    Diabetes mellitus, type 2 11/22/2024    Disorder of kidney and ureter 11/22/2024    Hypertension 11/22/2024    Myocardial infarction 11/22/2024    Pulmonary embolism 11/22/2024    Seizures 11/22/2024    Stroke 11/22/2024         Past Surgical History:   Procedure Laterality Date    BILE DUCT STENT PLACEMENT      CHOLECYSTECTOMY      FRACTURE SURGERY  2006 & 2010    Ankle, place pin the remove pin    HYSTERECTOMY  1999    KRISTIN with BSO secondary to family hx ovarian ca and endometriosis    R ankle surgery Right     THYROID LOBECTOMY Left 11/24/2021    Procedure: LOBECTOMY, THYROID;  Surgeon: Reyes Harrison MD;  Location: [facility] OR [address] [place] [address];  Service: ENT;  Laterality: Left;    toe fracture Left     4th metatarsal       Review of Systems   Constitutional:  Negative for chills, fatigue, fever and unexpected weight change.   HENT:  Negative for congestion, hearing loss, rhinorrhea, sore throat, tinnitus and trouble swallowing.    Eyes:  Negative for visual disturbance.   Respiratory:  Negative for cough, chest tightness, shortness of breath and  "wheezing.    Cardiovascular:  Negative for chest pain, palpitations and leg swelling.   Gastrointestinal:  Negative for constipation and diarrhea.        Denies Fatty liver, Hepatitis   Genitourinary:  Negative for decreased urine volume, difficulty urinating, dysuria, frequency, hematuria and urgency.   Musculoskeletal:  Positive for arthralgias (right knee) and joint swelling (right knee). Negative for back pain, neck pain and neck stiffness.   Skin:  Negative for rash and wound.   Allergic/Immunologic: Positive for environmental allergies.   Neurological:  Negative for dizziness, syncope, weakness, numbness and headaches.   Hematological:  Bruises/bleeds easily.   Psychiatric/Behavioral:  Negative for sleep disturbance and suicidal ideas.               VITALS  Visit Vitals  BP (!) 144/85 (BP Location: Left arm, Patient Position: Sitting)   Pulse 79   Temp 99 °F (37.2 °C) (Oral)   Ht 5' 5" (1.651 m)   Wt 95 kg (209 lb 7 oz)   SpO2 97%   BMI 34.85 kg/m²          Physical Exam  Vitals reviewed.   Constitutional:       General: She is not in acute distress.     Appearance: She is well-developed. She is obese.   HENT:      Head: Normocephalic.      Nose: Nose normal.      Mouth/Throat:      Pharynx: No oropharyngeal exudate.   Eyes:      General:         Right eye: No discharge.         Left eye: No discharge.      Conjunctiva/sclera: Conjunctivae normal.      Pupils: Pupils are equal, round, and reactive to light.   Neck:      Thyroid: No thyromegaly.      Vascular: No carotid bruit or JVD.      Trachea: No tracheal deviation.   Cardiovascular:      Rate and Rhythm: Normal rate and regular rhythm.      Pulses:           Carotid pulses are 2+ on the right side and 2+ on the left side.       Dorsalis pedis pulses are 2+ on the right side and 2+ on the left side.        Posterior tibial pulses are 2+ on the right side and 2+ on the left side.      Heart sounds: Normal heart sounds. No murmur heard.  Pulmonary:      " Effort: Pulmonary effort is normal. No respiratory distress.      Breath sounds: Normal breath sounds. No stridor. No wheezing, rhonchi or rales.   Abdominal:      General: Bowel sounds are normal. There is no distension.      Tenderness: There is no abdominal tenderness. There is no guarding.   Musculoskeletal:      Cervical back: Normal range of motion. No pain with movement.      Right lower leg: No edema.      Left lower leg: Edema (trace) present.   Lymphadenopathy:      Cervical: No cervical adenopathy.   Skin:     General: Skin is warm and dry.      Capillary Refill: Capillary refill takes less than 2 seconds.      Findings: No erythema or rash.   Neurological:      Mental Status: She is alert and oriented to person, place, and time.   Psychiatric:         Behavior: Behavior normal. Behavior is cooperative.          Significant Labs:  Lab Results   Component Value Date    WBC 7.06 12/16/2023    HGB 10.8 (L) 12/16/2023    HCT 35.5 (L) 12/16/2023     12/16/2023    CHOL 179 12/16/2023    TRIG 115 12/16/2023    HDL 48 12/16/2023    ALT 15 12/16/2023    ALT 15 12/16/2023    AST 17 12/16/2023    AST 17 12/16/2023     12/16/2023     12/16/2023    K 4.0 12/16/2023    K 4.0 12/16/2023     12/16/2023     12/16/2023    CREATININE 0.8 12/16/2023    CREATININE 0.8 12/16/2023    BUN 14 12/16/2023    BUN 14 12/16/2023    CO2 24 12/16/2023    CO2 24 12/16/2023    TSH 2.390 12/16/2023    HGBA1C 6.4 (H) 12/16/2023    HGBA1C 6.4 (H) 12/16/2023             Active Cardiac Conditions: None      Revised Cardiac Risk Index   High -Risk Surgery  Intraperitoneal; Intrathoracic; suprainguinal vascular Yes- + 1 No- 0   History of Ischemic Heart Disease   (Hx of MI/positive exercise test/current chest pain due to ischemia/use of nitrate therapy/EKG with pathological Q waves) Yes- + 1 No- 0   History of CHF  (Pulmonary edema/bilateral rales or S3 gallop/PND/CXR showing pulmonary vascular redistribution) Yes-  + 1 No- 0   History of CVA   (Prior stroke or TIA) Yes- + 1 No- 0   Pre-operative treatment with insulin Yes- + 1 No- 0   Pre-operative creatinine > 2mg/dl Yes- + 1 No- 0   Total: 0      Risk Status:  Estimated risk of cardiac complications after non-cardiac surgery using the Revised Cardiac Risk Index for Preoperative risk is 3.9 %      ARISCAT ([place]) risk index: Low: 1.6% risk of post-op pulmonary complications; Intermediate: 13.3% risk of post-op pulmonary complications if surgery is > 3 hours.     American Society of Anesthesiologists Physical Status classification (ASA): 2             Outpatient Subjective & Objective

## 2024-11-25 ENCOUNTER — PATIENT MESSAGE (OUTPATIENT)
Dept: INTERNAL MEDICINE | Facility: CLINIC | Age: 64
End: 2024-11-25
Payer: COMMERCIAL

## 2024-12-03 ENCOUNTER — ANESTHESIA EVENT (OUTPATIENT)
Dept: SURGERY | Facility: HOSPITAL | Age: 64
End: 2024-12-03
Payer: COMMERCIAL

## 2024-12-03 ENCOUNTER — OFFICE VISIT (OUTPATIENT)
Dept: SPORTS MEDICINE | Facility: CLINIC | Age: 64
End: 2024-12-03
Payer: COMMERCIAL

## 2024-12-03 DIAGNOSIS — S83.231A COMPLEX TEAR OF MEDIAL MENISCUS OF RIGHT KNEE, UNSPECIFIED WHETHER OLD OR CURRENT TEAR, INITIAL ENCOUNTER: Primary | ICD-10-CM

## 2024-12-03 DIAGNOSIS — S83.271A COMPLEX TEAR OF LATERAL MENISCUS OF RIGHT KNEE AS CURRENT INJURY, INITIAL ENCOUNTER: ICD-10-CM

## 2024-12-03 DIAGNOSIS — M94.261 CHONDROMALACIA, RIGHT KNEE: ICD-10-CM

## 2024-12-03 PROCEDURE — 99499 UNLISTED E&M SERVICE: CPT | Mod: S$GLB,,, | Performed by: PHYSICIAN ASSISTANT

## 2024-12-03 PROCEDURE — 99999 PR PBB SHADOW E&M-EST. PATIENT-LVL III: CPT | Mod: PBBFAC,,, | Performed by: PHYSICIAN ASSISTANT

## 2024-12-03 RX ORDER — TRAMADOL HYDROCHLORIDE 50 MG/1
50 TABLET ORAL EVERY 6 HOURS PRN
Qty: 15 TABLET | Refills: 0 | Status: SHIPPED | OUTPATIENT
Start: 2024-12-03

## 2024-12-03 RX ORDER — PROMETHAZINE HYDROCHLORIDE 25 MG/1
25 TABLET ORAL EVERY 6 HOURS PRN
Qty: 20 TABLET | Refills: 0 | Status: SHIPPED | OUTPATIENT
Start: 2024-12-03

## 2024-12-03 RX ORDER — ASPIRIN 325 MG
325 TABLET ORAL DAILY
Qty: 21 TABLET | Refills: 0 | Status: SHIPPED | OUTPATIENT
Start: 2024-12-03 | End: 2024-12-25

## 2024-12-03 RX ORDER — SODIUM CHLORIDE 9 MG/ML
INJECTION, SOLUTION INTRAVENOUS CONTINUOUS
Status: CANCELLED | OUTPATIENT
Start: 2024-12-03

## 2024-12-03 RX ORDER — HYDROCODONE BITARTRATE AND ACETAMINOPHEN 7.5; 325 MG/1; MG/1
1 TABLET ORAL EVERY 6 HOURS PRN
Qty: 15 TABLET | Refills: 0 | Status: SHIPPED | OUTPATIENT
Start: 2024-12-03

## 2024-12-03 NOTE — H&P (VIEW-ONLY)
Teodora Yuen  is here for a completion of her perioperative paperwork. she  Is scheduled to undergo:    Right knee arthroscopy with medial and lateral menisectomy and chondroplasty, possible meniscus repair if indicated, and any other indicated procedures.     on 12/4/2024.      She does need clearance for this procedure.    Patient has been cleared to proceed with surgery.      PAST MEDICAL HISTORY:   Past Medical History:   Diagnosis Date    Adrenal mass, right     GERD (gastroesophageal reflux disease)     Hyperlipidemia      PAST SURGICAL HISTORY:   Past Surgical History:   Procedure Laterality Date    BILE DUCT STENT PLACEMENT      CHOLECYSTECTOMY      FRACTURE SURGERY  2006 & 2010    Ankle, place pin the remove pin    HYSTERECTOMY  1999    KRISTIN with BSO secondary to family hx ovarian ca and endometriosis    R ankle surgery Right     THYROID LOBECTOMY Left 11/24/2021    Procedure: LOBECTOMY, THYROID;  Surgeon: Reyes Harrison MD;  Location: [facility] OR [address] [place] [address];  Service: ENT;  Laterality: Left;    toe fracture Left     4th metatarsal     FAMILY HISTORY:   Family History   Problem Relation Name Age of Onset    Heart disease Mother 1940     Cancer Mother 1940         ovarian    Atrial fibrillation Mother 1940     Lung disease Father      Cancer Maternal Grandmother Elisabeth BLUM         ovarian    Cancer Maternal Grandfather          colon    Diabetes Neg Hx      Hyperlipidemia Neg Hx      Hypertension Neg Hx      Stroke Neg Hx       SOCIAL HISTORY:   Social History     Socioeconomic History    Marital status:     Number of children: 3   Occupational History    Occupation:    Tobacco Use    Smoking status: Never     Passive exposure: Never    Smokeless tobacco: Never   Substance and Sexual Activity    Alcohol use: Yes     Alcohol/week: 1.0 standard drink of alcohol     Types: 1 Glasses of wine per week     Comment: social    Drug use: Never    Sexual activity: Yes     Partners: Male      Birth control/protection: See Surgical Hx     Social Drivers of Health     Financial Resource Strain: Low Risk  (3/19/2024)    Overall Financial Resource Strain (CARDIA)     Difficulty of Paying Living Expenses: Not very hard   Food Insecurity: Food Insecurity Present (3/19/2024)    Hunger Vital Sign     Worried About Running Out of Food in the Last Year: Sometimes true     Ran Out of Food in the Last Year: Patient declined   Transportation Needs: No Transportation Needs (3/19/2024)    PRAPARE - Transportation     Lack of Transportation (Medical): No     Lack of Transportation (Non-Medical): No   Physical Activity: Sufficiently Active (3/19/2024)    Exercise Vital Sign     Days of Exercise per Week: 4 days     Minutes of Exercise per Session: 40 min   Stress: No Stress Concern Present (3/19/2024)    [employer] [place] [employer] - Occupational Stress Questionnaire     Feeling of Stress : Only a little   Housing Stability: Low Risk  (3/19/2024)    Housing Stability Vital Sign     Unable to Pay for Housing in the Last Year: No     Number of Places Lived in the Last Year: 1     Unstable Housing in the Last Year: No       MEDICATIONS:   Current Outpatient Medications:     aspirin 325 MG tablet, Take 1 tablet (325 mg total) by mouth once daily. for 21 days, Disp: 21 tablet, Rfl: 0    calcium carbonate-vitamin D3 500 mg(1,250mg) -400 unit Tab, once daily. , Disp: , Rfl:     esomeprazole (NEXIUM) 40 MG capsule, Take 1 capsule (40 mg total) by mouth daily before breakfast., Disp: 90 capsule, Rfl: 3    ferrous gluconate (FERGON) 324 MG tablet, Take 1 tablet (324 mg total) by mouth 3 (three) times daily with meals. (Patient not taking: Reported on 11/22/2024), Disp: 90 tablet, Rfl: 4    fluticasone propionate (FLONASE) 50 mcg/actuation nasal spray, Use 1 spray (50 mcg total) in each nostril once daily., Disp: 48 g, Rfl: 3    HYDROcodone-acetaminophen (NORCO) 7.5-325 mg per tablet, Take 1 tablet by mouth every 6  (six) hours as needed for Pain., Disp: 15 tablet, Rfl: 0    hydrOXYzine HCL (ATARAX) 10 MG Tab, Take 1 tablet (10 mg total) by mouth nightly as needed (nightly as needed)., Disp: 90 tablet, Rfl: 1    ibuprofen (ADVIL,MOTRIN) 600 MG tablet, Take 1 tablet (600 mg total) by mouth every 6 (six) hours as needed for Pain., Disp: 40 tablet, Rfl: 0    pravastatin (PRAVACHOL) 40 MG tablet, Take 1 tablet (40 mg total) by mouth once daily., Disp: 90 tablet, Rfl: 3    promethazine (PHENERGAN) 25 MG tablet, Take 1 tablet (25 mg total) by mouth every 6 (six) hours as needed for Nausea., Disp: 20 tablet, Rfl: 0    traMADoL (ULTRAM) 50 mg tablet, Take 1 tablet (50 mg total) by mouth every 6 (six) hours as needed for Pain., Disp: 15 tablet, Rfl: 0  ALLERGIES:   Review of patient's allergies indicates:   Allergen Reactions    Coconut Hives       VITAL SIGNS: There were no vitals taken for this visit.     Risks, indications and benefits of the surgical procedure were discussed with the patient. All questions with regard to surgery, rehab, expected return to functional activities, activities of daily living and recreational endeavors were answered to her satisfaction.    It was explained to the patient that there may be an increase in surgical risks if the patient has certain co-morbidities such as but not limited to: Obesity, Cardiovascular issues (CHF, CAD, Arrhythmias), chronic pulmonary issues, previous or current neurovascular/neurological issues, previous strokes, diabetes mellitus, previous wound healing issues, previous wound or skin infections, PVD, clotting disorders, if the patient uses chronic steroids, if the patient takes or has immune compromising medications or diseases, or has previously or currently used tobacco products.     The patient verbalized that he/she does not have any additional clotting, bleeding, or blood disorders, other than what is list in her chart on today's review.     Then a brief history and  physical exam were performed.    Review of Systems   Constitution: Negative. Negative for chills, fever and night sweats.   HENT: Negative for congestion and headaches.    Eyes: Negative for blurred vision, left vision loss and right vision loss.   Cardiovascular: Negative for chest pain and syncope.   Respiratory: Negative for cough and shortness of breath.    Endocrine: Negative for polydipsia, polyphagia and polyuria.   Hematologic/Lymphatic: Negative for bleeding problem. Does not bruise/bleed easily.   Skin: Negative for dry skin, itching and rash.   Musculoskeletal: Negative for falls and muscle weakness.   Gastrointestinal: Negative for abdominal pain and bowel incontinence.   Genitourinary: Negative for bladder incontinence and nocturia.   Neurological: Negative for disturbances in coordination, loss of balance and seizures.   Psychiatric/Behavioral: Negative for depression. The patient does not have insomnia.    Allergic/Immunologic: Negative for hives and persistent infections.     PHYSICAL EXAM:  GEN: A&Ox3, WD WN NAD  HEENT: WNL  CHEST: CTAB, no W/R/R  HEART: RRR, no M/R/G  ABD: Soft, NT ND, BS x4 QUADS  MS; See Epic  NEURO: CN II-XII intact       The surgical consent was then reviewed with the patient, who agreed with all the contents of the consent form and it was signed. she was then given the [facility] surgery packet to bring with her to surgery for the anesthesia portion of her perioperative paperwork.   For all physicians except for Dr. Glover, we will email and possibly fax the consent forms and booking sheets to [facility] pre-admit.    The patient was given the opportunity to ask questions about the surgical plan and consent form, and once no other questions were asked, I proceeded with the pre-op appointment.    PHYSICAL THERAPY:  She was also instructed regarding physical therapy and will begin on POD#1 at the [facility] location.     POST OP CARE:instructions were  reviewed including care of the wound and dressing after surgery and when she can shower.     CRUTCHES OR WALKER: It was explained to the patient that if they are having a lower extremity surgery that they will require either a walker or crutches to ambulate safely with after surgery. It was explained that a cane or other assistive devices are not sufficient to safely ambulate with after surgery. I explained to the patient that I will place an order for them to receive either crutches or a walker after surgery to go home with. It was explained that if they have crutches or a walker at home already, that they are REQUIRED to bring them to the hospital on the day of surgery. It was explained that if they do not have them at the hospital on the day of surgery that they WILL be provided a new pair or crutches or a walker to go home with to ensure ambulation will be safe if the patient needs to stop somewhere on the way home.      PAIN MANAGEMENT: Teodora Yuen was also given their pain management regimen, which includes the TENS unit given to her by [facility] along with the education required for its use.    PAIN MEDICATION:  Norco 7.5/325mg 1 po q 4-6 hours prn pain  Ultram 50 mg Take 1-2 p.o. q.6 hours p.r.n. breakthrough pain,   Phenergan 25 mg one p.o. q.6 hours p.r.n. nausea and vomiting.    Post op meds to be delivered bedside prior to discharge. Deliver to family if patient is in surgery at 5pm.    The patient was told that narcotic pain medications may make them drowsy and instructions were given to not sign legal documents, drive or operate heavy machinery, cars, or equipment while under the influence of narcotic medications. The patient was told and understands that narcotic pain medications should only be used as needed to control pain and that other options of pain control include TENs unit and ice packs/unit.     Patient was instructed to purchase and take Colace to counter possible GI side effects of  taking opiates.     DVT prophylaxis was discussed with the patient today including risk factors for developing DVTs and history of DVTs. The patient was asked if any specific recommendations were given from the doctor/s that did pre-operative surgical clearance. The patient was then given an education sheet about DVTs and PE with warning signs and symptoms of both and steps to take if they suspect either of these.    Patient was asked if they were taking or using OCP pills or devices. If they answered yes, then they were instructed to stop using OCPs at this pre-operative appointment until 2 months post-op to help prevent DVT development. They understand that there are other forms of birth control that do not involve hormones. They expressed understanding that ignoring/not following this instruction could result in a DVT which could turn into a deadly pulmonary embolism.     This along with the Modified Caprini risk assessment model for VTE in general surgical patients was used to determine the patient's DVT risk.     From: Delfino GUNDERSON, Channing DA, Darlyn BAL, et al. Prevention of VTE in nonorthopedic surgical patients: antithrombotic therapy and prevention of thrombosis, 9th ed: American College of Chest Physicians evidence-based clinical practical guidelines. Chest 2012; 141:e227S. Copyright © 2012. Reproduced with permission from the American College of Chest Physicians.    The below listed DVT prophylaxis regimen was discussed along with SCDs during surgery and bilateral JACKIE compression stockings to be used post-op. Length of treatment has been determined to be 10-42 days post-op by the above noted Caprini assessment model. Early ambulation post-op was also discussed and emphasized with the patient.     Patient was instructed to buy and take:  Aspirin 325mg QD x 3 weeks for DVT prophylaxis starting on the evening after surgery.  Patient will also use bilateral TEDs on lower extremities, SCDs during surgery, and early  ambulation post-op. If the patient was previously taking 81mg baby aspirin, they were told to not take it will using the above stated aspirin and to restart the 81mg aspirin after completion of the aspirin dose.      Patient was also told to buy over the counter Prilosec medication and take it once daily for GI protection as long as they are taking NSAIDs or Aspirin.     Published data in Sneha JIMENEZ, et al. J Arthroplasty. Oct; 31(10):2237-40, 2016; showed that aspirin use as prophylaxis during revision total joint arthroplasty was more effective than warfarin in preventing symptomatic venous thromboembolic events and was associated with lower complications.  Patients in the study were also treated with intermittent pneumatic compression devices. Compression stockings would be our method of mechanical prophylaxis, which has been shown to be similar to pneumatic compression in the systematic review, Jed RJ, et al. [facility] Surg. Feb; 239(2): 162-171, 2004.     Results showed a significantly higher incidence of symptomatic venous thromboembolic events among patients in the warfarin group vs. the aspirin group (1.75% vs. 0.56%). Researchers also noted a bleeding event rate of 1.5% among patients who received warfarin compared with a rate of 0.4% among patients who received aspirin.    Patient denies history of seizures.     I explained to following and the patient expressed understanding:  The patient is currently aware of the COVID19 pandemic and that proceeding with their surgical procedure could potentially increase exposure to coronavirus in the community. The patient understands that there is the possibility of delayed or cancelled appts or PT visits in the future. They understand that infection with the coronavirus could complicate their surgery recovery. They are aware of the current policies and procedures of [facility] and the government regarding the pandemic and they were given the option of delaying my  surgery. The patient elects to proceed with surgery at this time.     The patient was instructed to practice strict social distancing, hand washing/hygiene, respiratory hygiene, and cough etiquette from now until 6 weeks following surgery to reduce the risk of mikal coronavirus.    As there were no other questions to be asked, she was given my business card along with Joel Glover MD business card if she has any questions or concerns prior to surgery or in the postop period.

## 2024-12-03 NOTE — H&P
Teodora Yuen  is here for a completion of her perioperative paperwork. she  Is scheduled to undergo:    Right knee arthroscopy with medial and lateral menisectomy and chondroplasty, possible meniscus repair if indicated, and any other indicated procedures.     on 12/4/2024.      She does need clearance for this procedure.    Patient has been cleared to proceed with surgery.      PAST MEDICAL HISTORY:   Past Medical History:   Diagnosis Date    Adrenal mass, right     GERD (gastroesophageal reflux disease)     Hyperlipidemia      PAST SURGICAL HISTORY:   Past Surgical History:   Procedure Laterality Date    BILE DUCT STENT PLACEMENT      CHOLECYSTECTOMY      FRACTURE SURGERY  2006 & 2010    Ankle, place pin the remove pin    HYSTERECTOMY  1999    KRISTIN with BSO secondary to family hx ovarian ca and endometriosis    R ankle surgery Right     THYROID LOBECTOMY Left 11/24/2021    Procedure: LOBECTOMY, THYROID;  Surgeon: Reyes Harrison MD;  Location: [facility] OR [address] [place] [address];  Service: ENT;  Laterality: Left;    toe fracture Left     4th metatarsal     FAMILY HISTORY:   Family History   Problem Relation Name Age of Onset    Heart disease Mother 1940     Cancer Mother 1940         ovarian    Atrial fibrillation Mother 1940     Lung disease Father      Cancer Maternal Grandmother Elisabeth BLUM         ovarian    Cancer Maternal Grandfather          colon    Diabetes Neg Hx      Hyperlipidemia Neg Hx      Hypertension Neg Hx      Stroke Neg Hx       SOCIAL HISTORY:   Social History     Socioeconomic History    Marital status:     Number of children: 3   Occupational History    Occupation:    Tobacco Use    Smoking status: Never     Passive exposure: Never    Smokeless tobacco: Never   Substance and Sexual Activity    Alcohol use: Yes     Alcohol/week: 1.0 standard drink of alcohol     Types: 1 Glasses of wine per week     Comment: social    Drug use: Never    Sexual activity: Yes     Partners: Male      Birth control/protection: See Surgical Hx     Social Drivers of Health     Financial Resource Strain: Low Risk  (3/19/2024)    Overall Financial Resource Strain (CARDIA)     Difficulty of Paying Living Expenses: Not very hard   Food Insecurity: Food Insecurity Present (3/19/2024)    Hunger Vital Sign     Worried About Running Out of Food in the Last Year: Sometimes true     Ran Out of Food in the Last Year: Patient declined   Transportation Needs: No Transportation Needs (3/19/2024)    PRAPARE - Transportation     Lack of Transportation (Medical): No     Lack of Transportation (Non-Medical): No   Physical Activity: Sufficiently Active (3/19/2024)    Exercise Vital Sign     Days of Exercise per Week: 4 days     Minutes of Exercise per Session: 40 min   Stress: No Stress Concern Present (3/19/2024)    [employer] - Occupational Stress Questionnaire     Feeling of Stress : Only a little   Housing Stability: Low Risk  (3/19/2024)    Housing Stability Vital Sign     Unable to Pay for Housing in the Last Year: No     Number of Places Lived in the Last Year: 1     Unstable Housing in the Last Year: No       MEDICATIONS:   Current Outpatient Medications:     aspirin 325 MG tablet, Take 1 tablet (325 mg total) by mouth once daily. for 21 days, Disp: 21 tablet, Rfl: 0    calcium carbonate-vitamin D3 500 mg(1,250mg) -400 unit Tab, once daily. , Disp: , Rfl:     esomeprazole (NEXIUM) 40 MG capsule, Take 1 capsule (40 mg total) by mouth daily before breakfast., Disp: 90 capsule, Rfl: 3    ferrous gluconate (FERGON) 324 MG tablet, Take 1 tablet (324 mg total) by mouth 3 (three) times daily with meals. (Patient not taking: Reported on 11/22/2024), Disp: 90 tablet, Rfl: 4    fluticasone propionate (FLONASE) 50 mcg/actuation nasal spray, Use 1 spray (50 mcg total) in each nostril once daily., Disp: 48 g, Rfl: 3    HYDROcodone-acetaminophen (NORCO) 7.5-325 mg per tablet, Take 1 tablet by mouth every 6  (six) hours as needed for Pain., Disp: 15 tablet, Rfl: 0    hydrOXYzine HCL (ATARAX) 10 MG Tab, Take 1 tablet (10 mg total) by mouth nightly as needed (nightly as needed)., Disp: 90 tablet, Rfl: 1    ibuprofen (ADVIL,MOTRIN) 600 MG tablet, Take 1 tablet (600 mg total) by mouth every 6 (six) hours as needed for Pain., Disp: 40 tablet, Rfl: 0    pravastatin (PRAVACHOL) 40 MG tablet, Take 1 tablet (40 mg total) by mouth once daily., Disp: 90 tablet, Rfl: 3    promethazine (PHENERGAN) 25 MG tablet, Take 1 tablet (25 mg total) by mouth every 6 (six) hours as needed for Nausea., Disp: 20 tablet, Rfl: 0    traMADoL (ULTRAM) 50 mg tablet, Take 1 tablet (50 mg total) by mouth every 6 (six) hours as needed for Pain., Disp: 15 tablet, Rfl: 0  ALLERGIES:   Review of patient's allergies indicates:   Allergen Reactions    Coconut Hives       VITAL SIGNS: There were no vitals taken for this visit.     Risks, indications and benefits of the surgical procedure were discussed with the patient. All questions with regard to surgery, rehab, expected return to functional activities, activities of daily living and recreational endeavors were answered to her satisfaction.    It was explained to the patient that there may be an increase in surgical risks if the patient has certain co-morbidities such as but not limited to: Obesity, Cardiovascular issues (CHF, CAD, Arrhythmias), chronic pulmonary issues, previous or current neurovascular/neurological issues, previous strokes, diabetes mellitus, previous wound healing issues, previous wound or skin infections, PVD, clotting disorders, if the patient uses chronic steroids, if the patient takes or has immune compromising medications or diseases, or has previously or currently used tobacco products.     The patient verbalized that he/she does not have any additional clotting, bleeding, or blood disorders, other than what is list in her chart on today's review.     Then a brief history and  physical exam were performed.    Review of Systems   Constitution: Negative. Negative for chills, fever and night sweats.   HENT: Negative for congestion and headaches.    Eyes: Negative for blurred vision, left vision loss and right vision loss.   Cardiovascular: Negative for chest pain and syncope.   Respiratory: Negative for cough and shortness of breath.    Endocrine: Negative for polydipsia, polyphagia and polyuria.   Hematologic/Lymphatic: Negative for bleeding problem. Does not bruise/bleed easily.   Skin: Negative for dry skin, itching and rash.   Musculoskeletal: Negative for falls and muscle weakness.   Gastrointestinal: Negative for abdominal pain and bowel incontinence.   Genitourinary: Negative for bladder incontinence and nocturia.   Neurological: Negative for disturbances in coordination, loss of balance and seizures.   Psychiatric/Behavioral: Negative for depression. The patient does not have insomnia.    Allergic/Immunologic: Negative for hives and persistent infections.     PHYSICAL EXAM:  GEN: A&Ox3, WD WN NAD  HEENT: WNL  CHEST: CTAB, no W/R/R  HEART: RRR, no M/R/G  ABD: Soft, NT ND, BS x4 QUADS  MS; See Epic  NEURO: CN II-XII intact       The surgical consent was then reviewed with the patient, who agreed with all the contents of the consent form and it was signed. she was then given the [facility] surgery packet to bring with her to surgery for the anesthesia portion of her perioperative paperwork.   For all physicians except for Dr. Glover, we will email and possibly fax the consent forms and booking sheets to [facility] pre-admit.    The patient was given the opportunity to ask questions about the surgical plan and consent form, and once no other questions were asked, I proceeded with the pre-op appointment.    PHYSICAL THERAPY:  She was also instructed regarding physical therapy and will begin on POD#1 at the [facility] location.     POST OP CARE:instructions were  reviewed including care of the wound and dressing after surgery and when she can shower.     CRUTCHES OR WALKER: It was explained to the patient that if they are having a lower extremity surgery that they will require either a walker or crutches to ambulate safely with after surgery. It was explained that a cane or other assistive devices are not sufficient to safely ambulate with after surgery. I explained to the patient that I will place an order for them to receive either crutches or a walker after surgery to go home with. It was explained that if they have crutches or a walker at home already, that they are REQUIRED to bring them to the hospital on the day of surgery. It was explained that if they do not have them at the hospital on the day of surgery that they WILL be provided a new pair or crutches or a walker to go home with to ensure ambulation will be safe if the patient needs to stop somewhere on the way home.      PAIN MANAGEMENT: Teodora Yuen was also given their pain management regimen, which includes the TENS unit given to her by [facility] along with the education required for its use.    PAIN MEDICATION:  Norco 7.5/325mg 1 po q 4-6 hours prn pain  Ultram 50 mg Take 1-2 p.o. q.6 hours p.r.n. breakthrough pain,   Phenergan 25 mg one p.o. q.6 hours p.r.n. nausea and vomiting.    Post op meds to be delivered bedside prior to discharge. Deliver to family if patient is in surgery at 5pm.    The patient was told that narcotic pain medications may make them drowsy and instructions were given to not sign legal documents, drive or operate heavy machinery, cars, or equipment while under the influence of narcotic medications. The patient was told and understands that narcotic pain medications should only be used as needed to control pain and that other options of pain control include TENs unit and ice packs/unit.     Patient was instructed to purchase and take Colace to counter possible GI side effects of  taking opiates.     DVT prophylaxis was discussed with the patient today including risk factors for developing DVTs and history of DVTs. The patient was asked if any specific recommendations were given from the doctor/s that did pre-operative surgical clearance. The patient was then given an education sheet about DVTs and PE with warning signs and symptoms of both and steps to take if they suspect either of these.    Patient was asked if they were taking or using OCP pills or devices. If they answered yes, then they were instructed to stop using OCPs at this pre-operative appointment until 2 months post-op to help prevent DVT development. They understand that there are other forms of birth control that do not involve hormones. They expressed understanding that ignoring/not following this instruction could result in a DVT which could turn into a deadly pulmonary embolism.     This along with the Modified Caprini risk assessment model for VTE in general surgical patients was used to determine the patient's DVT risk.     From: Delfino GUNDERSON, Channing DA, Darlyn BAL, et al. Prevention of VTE in nonorthopedic surgical patients: antithrombotic therapy and prevention of thrombosis, 9th ed: American College of Chest Physicians evidence-based clinical practical guidelines. Chest 2012; 141:e227S. Copyright © 2012. Reproduced with permission from the American College of Chest Physicians.    The below listed DVT prophylaxis regimen was discussed along with SCDs during surgery and bilateral JACKIE compression stockings to be used post-op. Length of treatment has been determined to be 10-42 days post-op by the above noted Caprini assessment model. Early ambulation post-op was also discussed and emphasized with the patient.     Patient was instructed to buy and take:  Aspirin 325mg QD x 3 weeks for DVT prophylaxis starting on the evening after surgery.  Patient will also use bilateral TEDs on lower extremities, SCDs during surgery, and early  ambulation post-op. If the patient was previously taking 81mg baby aspirin, they were told to not take it will using the above stated aspirin and to restart the 81mg aspirin after completion of the aspirin dose.      Patient was also told to buy over the counter Prilosec medication and take it once daily for GI protection as long as they are taking NSAIDs or Aspirin.     Published data in Sneha JIMENEZ, et al. J Arthroplasty. Oct; 31(10):2237-40, 2016; showed that aspirin use as prophylaxis during revision total joint arthroplasty was more effective than warfarin in preventing symptomatic venous thromboembolic events and was associated with lower complications.  Patients in the study were also treated with intermittent pneumatic compression devices. Compression stockings would be our method of mechanical prophylaxis, which has been shown to be similar to pneumatic compression in the systematic review, Jed RJ, et al. Mana Surg. Feb; 239(2): 162-171, 2004.     Results showed a significantly higher incidence of symptomatic venous thromboembolic events among patients in the warfarin group vs. the aspirin group (1.75% vs. 0.56%). Researchers also noted a bleeding event rate of 1.5% among patients who received warfarin compared with a rate of 0.4% among patients who received aspirin.    Patient denies history of seizures.     I explained to following and the patient expressed understanding:  The patient is currently aware of the COVID19 pandemic and that proceeding with their surgical procedure could potentially increase exposure to coronavirus in the community. The patient understands that there is the possibility of delayed or cancelled appts or PT visits in the future. They understand that infection with the coronavirus could complicate their surgery recovery. They are aware of the current policies and procedures of [facility] and the government regarding the pandemic and they were given the option of delaying my  surgery. The patient elects to proceed with surgery at this time.     The patient was instructed to practice strict social distancing, hand washing/hygiene, respiratory hygiene, and cough etiquette from now until 6 weeks following surgery to reduce the risk of mikal coronavirus.    As there were no other questions to be asked, she was given my business card along with Joel Glover MD business card if she has any questions or concerns prior to surgery or in the postop period.

## 2024-12-04 ENCOUNTER — HOSPITAL ENCOUNTER (OUTPATIENT)
Facility: HOSPITAL | Age: 64
Discharge: HOME | End: 2024-12-04
Attending: ORTHOPAEDIC SURGERY | Admitting: ORTHOPAEDIC SURGERY
Payer: COMMERCIAL

## 2024-12-04 ENCOUNTER — ANESTHESIA (OUTPATIENT)
Dept: SURGERY | Facility: HOSPITAL | Age: 64
End: 2024-12-04
Payer: COMMERCIAL

## 2024-12-04 VITALS
WEIGHT: 209 LBS | TEMPERATURE: 98 F | RESPIRATION RATE: 14 BRPM | BODY MASS INDEX: 34.82 KG/M2 | OXYGEN SATURATION: 98 % | DIASTOLIC BLOOD PRESSURE: 87 MMHG | HEIGHT: 65 IN | SYSTOLIC BLOOD PRESSURE: 161 MMHG | HEART RATE: 80 BPM

## 2024-12-04 DIAGNOSIS — S83.231A COMPLEX TEAR OF MEDIAL MENISCUS OF RIGHT KNEE, UNSPECIFIED WHETHER OLD OR CURRENT TEAR, INITIAL ENCOUNTER: ICD-10-CM

## 2024-12-04 LAB
BUN SERPL-MCNC: 13 MG/DL (ref 6–30)
CHLORIDE SERPL-SCNC: 103 MMOL/L (ref 95–110)
CREAT SERPL-MCNC: 0.7 MG/DL (ref 0.5–1.4)
GLUCOSE SERPL-MCNC: 151 MG/DL (ref 70–110)
HCT VFR BLD CALC: 43 %PCV (ref 36–54)
POC IONIZED CALCIUM: 1.17 MMOL/L (ref 1.06–1.42)
POC TCO2 (MEASURED): 26 MMOL/L (ref 23–29)
POTASSIUM BLD-SCNC: 4.1 MMOL/L (ref 3.5–5.1)
SAMPLE: ABNORMAL
SODIUM BLD-SCNC: 142 MMOL/L (ref 136–145)

## 2024-12-04 PROCEDURE — 84295 ASSAY OF SERUM SODIUM: CPT

## 2024-12-04 PROCEDURE — 37000008 HC ANESTHESIA 1ST 15 MINUTES: Performed by: ORTHOPAEDIC SURGERY

## 2024-12-04 PROCEDURE — 36000710: Performed by: ORTHOPAEDIC SURGERY

## 2024-12-04 PROCEDURE — 25000003 PHARM REV CODE 250: Performed by: NURSE ANESTHETIST, CERTIFIED REGISTERED

## 2024-12-04 PROCEDURE — 25000003 PHARM REV CODE 250: Performed by: STUDENT IN AN ORGANIZED HEALTH CARE EDUCATION/TRAINING PROGRAM

## 2024-12-04 PROCEDURE — 63600175 PHARM REV CODE 636 W HCPCS: Performed by: STUDENT IN AN ORGANIZED HEALTH CARE EDUCATION/TRAINING PROGRAM

## 2024-12-04 PROCEDURE — 37000009 HC ANESTHESIA EA ADD 15 MINS: Performed by: ORTHOPAEDIC SURGERY

## 2024-12-04 PROCEDURE — 84132 ASSAY OF SERUM POTASSIUM: CPT

## 2024-12-04 PROCEDURE — 29880 ARTHRS KNE SRG MNISECTMY M&L: CPT | Mod: RT,,, | Performed by: ORTHOPAEDIC SURGERY

## 2024-12-04 PROCEDURE — 63600175 PHARM REV CODE 636 W HCPCS: Performed by: NURSE ANESTHETIST, CERTIFIED REGISTERED

## 2024-12-04 PROCEDURE — 27201423 OPTIME MED/SURG SUP & DEVICES STERILE SUPPLY: Performed by: ORTHOPAEDIC SURGERY

## 2024-12-04 PROCEDURE — 85014 HEMATOCRIT: CPT

## 2024-12-04 PROCEDURE — 82330 ASSAY OF CALCIUM: CPT

## 2024-12-04 PROCEDURE — 99900035 HC TECH TIME PER 15 MIN (STAT)

## 2024-12-04 PROCEDURE — 63600175 PHARM REV CODE 636 W HCPCS: Performed by: PHYSICIAN ASSISTANT

## 2024-12-04 PROCEDURE — 94761 N-INVAS EAR/PLS OXIMETRY MLT: CPT

## 2024-12-04 PROCEDURE — 71000015 HC POSTOP RECOV 1ST HR: Performed by: ORTHOPAEDIC SURGERY

## 2024-12-04 PROCEDURE — 71000033 HC RECOVERY, INTIAL HOUR: Performed by: ORTHOPAEDIC SURGERY

## 2024-12-04 PROCEDURE — 82565 ASSAY OF CREATININE: CPT

## 2024-12-04 PROCEDURE — 25000003 PHARM REV CODE 250: Performed by: PHYSICIAN ASSISTANT

## 2024-12-04 PROCEDURE — 36000711: Performed by: ORTHOPAEDIC SURGERY

## 2024-12-04 RX ORDER — SODIUM CHLORIDE 9 MG/ML
INJECTION, SOLUTION INTRAVENOUS CONTINUOUS
Status: DISCONTINUED | OUTPATIENT
Start: 2024-12-04 | End: 2024-12-04 | Stop reason: HOSPADM

## 2024-12-04 RX ORDER — CEFAZOLIN 2 G/1
2 INJECTION, POWDER, FOR SOLUTION INTRAMUSCULAR; INTRAVENOUS
Status: DISCONTINUED | OUTPATIENT
Start: 2024-12-04 | End: 2024-12-04 | Stop reason: HOSPADM

## 2024-12-04 RX ORDER — PROPOFOL 10 MG/ML
VIAL (ML) INTRAVENOUS
Status: DISCONTINUED | OUTPATIENT
Start: 2024-12-04 | End: 2024-12-04

## 2024-12-04 RX ORDER — MIDAZOLAM HYDROCHLORIDE 1 MG/ML
INJECTION INTRAMUSCULAR; INTRAVENOUS
Status: DISCONTINUED | OUTPATIENT
Start: 2024-12-04 | End: 2024-12-04

## 2024-12-04 RX ORDER — FENTANYL CITRATE 50 UG/ML
100 INJECTION, SOLUTION INTRAMUSCULAR; INTRAVENOUS
Status: DISCONTINUED | OUTPATIENT
Start: 2024-12-04 | End: 2024-12-04 | Stop reason: HOSPADM

## 2024-12-04 RX ORDER — DEXAMETHASONE SODIUM PHOSPHATE 4 MG/ML
INJECTION, SOLUTION INTRA-ARTICULAR; INTRALESIONAL; INTRAMUSCULAR; INTRAVENOUS; SOFT TISSUE
Status: DISCONTINUED | OUTPATIENT
Start: 2024-12-04 | End: 2024-12-04

## 2024-12-04 RX ORDER — HALOPERIDOL 5 MG/ML
0.5 INJECTION INTRAMUSCULAR EVERY 10 MIN PRN
Status: DISCONTINUED | OUTPATIENT
Start: 2024-12-04 | End: 2024-12-04 | Stop reason: HOSPADM

## 2024-12-04 RX ORDER — ONDANSETRON HYDROCHLORIDE 2 MG/ML
4 INJECTION, SOLUTION INTRAVENOUS DAILY PRN
Status: DISCONTINUED | OUTPATIENT
Start: 2024-12-04 | End: 2024-12-04 | Stop reason: HOSPADM

## 2024-12-04 RX ORDER — ACETAMINOPHEN 500 MG
1000 TABLET ORAL
Status: COMPLETED | OUTPATIENT
Start: 2024-12-04 | End: 2024-12-04

## 2024-12-04 RX ORDER — GLUCAGON 1 MG
1 KIT INJECTION
Status: DISCONTINUED | OUTPATIENT
Start: 2024-12-04 | End: 2024-12-04 | Stop reason: HOSPADM

## 2024-12-04 RX ORDER — LIDOCAINE HYDROCHLORIDE 20 MG/ML
INJECTION INTRAVENOUS
Status: DISCONTINUED | OUTPATIENT
Start: 2024-12-04 | End: 2024-12-04

## 2024-12-04 RX ORDER — OXYCODONE HYDROCHLORIDE 5 MG/1
5 TABLET ORAL
Status: DISCONTINUED | OUTPATIENT
Start: 2024-12-04 | End: 2024-12-04 | Stop reason: HOSPADM

## 2024-12-04 RX ORDER — MIDAZOLAM HYDROCHLORIDE 1 MG/ML
1 INJECTION, SOLUTION INTRAMUSCULAR; INTRAVENOUS
Status: DISCONTINUED | OUTPATIENT
Start: 2024-12-04 | End: 2024-12-04 | Stop reason: HOSPADM

## 2024-12-04 RX ORDER — FAMOTIDINE 10 MG/ML
INJECTION INTRAVENOUS
Status: DISCONTINUED | OUTPATIENT
Start: 2024-12-04 | End: 2024-12-04

## 2024-12-04 RX ORDER — ONDANSETRON HYDROCHLORIDE 2 MG/ML
INJECTION, SOLUTION INTRAMUSCULAR; INTRAVENOUS
Status: DISCONTINUED | OUTPATIENT
Start: 2024-12-04 | End: 2024-12-04

## 2024-12-04 RX ORDER — CEFAZOLIN SODIUM 1 G/3ML
INJECTION, POWDER, FOR SOLUTION INTRAMUSCULAR; INTRAVENOUS
Status: DISCONTINUED | OUTPATIENT
Start: 2024-12-04 | End: 2024-12-04

## 2024-12-04 RX ORDER — FENTANYL CITRATE 50 UG/ML
INJECTION, SOLUTION INTRAMUSCULAR; INTRAVENOUS
Status: DISCONTINUED | OUTPATIENT
Start: 2024-12-04 | End: 2024-12-04

## 2024-12-04 RX ORDER — DEXMEDETOMIDINE HYDROCHLORIDE 100 UG/ML
INJECTION, SOLUTION INTRAVENOUS
Status: DISCONTINUED | OUTPATIENT
Start: 2024-12-04 | End: 2024-12-04

## 2024-12-04 RX ORDER — CELECOXIB 200 MG/1
400 CAPSULE ORAL
Status: COMPLETED | OUTPATIENT
Start: 2024-12-04 | End: 2024-12-04

## 2024-12-04 RX ORDER — FENTANYL CITRATE 50 UG/ML
25 INJECTION, SOLUTION INTRAMUSCULAR; INTRAVENOUS EVERY 5 MIN PRN
Status: DISCONTINUED | OUTPATIENT
Start: 2024-12-04 | End: 2024-12-04 | Stop reason: HOSPADM

## 2024-12-04 RX ADMIN — LIDOCAINE HYDROCHLORIDE 100 MG: 20 INJECTION INTRAVENOUS at 03:12

## 2024-12-04 RX ADMIN — MIDAZOLAM HYDROCHLORIDE 1 MG: 1 INJECTION, SOLUTION INTRAMUSCULAR; INTRAVENOUS at 02:12

## 2024-12-04 RX ADMIN — SODIUM CHLORIDE: 0.9 INJECTION, SOLUTION INTRAVENOUS at 11:12

## 2024-12-04 RX ADMIN — PROPOFOL 50 MG: 10 INJECTION, EMULSION INTRAVENOUS at 03:12

## 2024-12-04 RX ADMIN — FENTANYL CITRATE 25 MCG: 50 INJECTION INTRAMUSCULAR; INTRAVENOUS at 05:12

## 2024-12-04 RX ADMIN — FENTANYL CITRATE 50 MCG: 0.05 INJECTION, SOLUTION INTRAMUSCULAR; INTRAVENOUS at 03:12

## 2024-12-04 RX ADMIN — CEFAZOLIN 2 G: 330 INJECTION, POWDER, FOR SOLUTION INTRAMUSCULAR; INTRAVENOUS at 03:12

## 2024-12-04 RX ADMIN — CELECOXIB 400 MG: 200 CAPSULE ORAL at 11:12

## 2024-12-04 RX ADMIN — ONDANSETRON 4 MG: 2 INJECTION INTRAMUSCULAR; INTRAVENOUS at 03:12

## 2024-12-04 RX ADMIN — MIDAZOLAM HYDROCHLORIDE 1 MG: 1 INJECTION, SOLUTION INTRAMUSCULAR; INTRAVENOUS at 03:12

## 2024-12-04 RX ADMIN — DEXMEDETOMIDINE 4 MCG: 100 INJECTION, SOLUTION, CONCENTRATE INTRAVENOUS at 03:12

## 2024-12-04 RX ADMIN — OXYCODONE HYDROCHLORIDE 5 MG: 5 TABLET ORAL at 04:12

## 2024-12-04 RX ADMIN — PROPOFOL 200 MG: 10 INJECTION, EMULSION INTRAVENOUS at 03:12

## 2024-12-04 RX ADMIN — FENTANYL CITRATE 100 MCG: 50 INJECTION INTRAMUSCULAR; INTRAVENOUS at 12:12

## 2024-12-04 RX ADMIN — DEXAMETHASONE SODIUM PHOSPHATE 4 MG: 4 INJECTION, SOLUTION INTRAMUSCULAR; INTRAVENOUS at 03:12

## 2024-12-04 RX ADMIN — FAMOTIDINE 20 MG: 10 INJECTION, SOLUTION INTRAVENOUS at 02:12

## 2024-12-04 RX ADMIN — MIDAZOLAM 2 MG: 1 INJECTION INTRAMUSCULAR; INTRAVENOUS at 12:12

## 2024-12-04 RX ADMIN — ACETAMINOPHEN 1000 MG: 500 TABLET ORAL at 11:12

## 2024-12-04 RX ADMIN — DEXMEDETOMIDINE 8 MCG: 100 INJECTION, SOLUTION, CONCENTRATE INTRAVENOUS at 02:12

## 2024-12-04 NOTE — TRANSFER OF CARE
"Anesthesia Transfer of Care Note    Patient: Teodora Yuen    Procedure(s) Performed: Procedure(s) (LRB):  ARTHROSCOPY, KNEE, WITH PARTIAL MEDIAL AND LATERAL MENISCECTOMY (Right)  ARTHROSCOPY, KNEE, WITH CHONDROPLASTY (Right)    Patient location: PACU    Anesthesia Type: general and regional    Transport from OR: Transported from OR on 100% O2 by closed face mask with adequate spontaneous ventilation    Post pain: adequate analgesia    Post assessment: no apparent anesthetic complications and tolerated procedure well    Post vital signs: stable    Level of consciousness: awake, oriented and alert    Nausea/Vomiting: no nausea/vomiting    Complications: none    Transfer of care protocol was followed    Last vitals: Visit Vitals  BP (!) 148/66 (BP Location: Left arm, Patient Position: Lying)   Pulse 107   Temp 36.3 °C (97.4 °F) (Temporal)   Resp 16   Ht 5' 5" (1.651 m)   Wt 94.8 kg (209 lb)   SpO2 100%   Breastfeeding No   BMI 34.78 kg/m²     "

## 2024-12-04 NOTE — ANESTHESIA PROCEDURE NOTES
Intubation    Date/Time: 12/4/2024 3:11 PM    Performed by: Mana Cortez CRNA  Authorized by: Lynn Uriarte MD    Intubation:     Induction:  Intravenous    Intubated:  Postinduction    Mask Ventilation:  Easy mask    Attempts:  1    Attempted By:  CRNA    Difficult Airway Encountered?: No      Complications:  None    Airway Device:  Supraglottic airway/LMA    Airway Device Size:  4.0    Style/Cuff Inflation:  Cuffed    Inflation Amount (mL):  5    Secured at:  The lips    Placement Verified By:  Capnometry    Complicating Factors:  None    Findings Post-Intubation:  BS equal bilateral

## 2024-12-04 NOTE — PLAN OF CARE
Pre op checklist complete. Pt resting in bed with call light in reach. All question answered. Pt belongings at beside and plan for daughter to hold. Daughter brought to bedside. Pt still needs anes consent/update/site dustin.

## 2024-12-04 NOTE — BRIEF OP NOTE
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]  Complex tear of lateral meniscus of right knee as current injury, initial encounter [S83.271A]    Post-op Diagnosis:  Post-Op Diagnosis Codes:     * Complex tear of medial meniscus of right knee, unspecified whether old or current tear, initial encounter [S83.231A]     * Chondromalacia, right knee [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]     * Complex tear of lateral meniscus of right knee as current injury, initial encounter [S83.271A]    Procedure(s) (LRB):  ARTHROSCOPY, KNEE, WITH PARTIAL MEDIAL AND LATERAL MENISCECTOMY (Right)  ARTHROSCOPY, KNEE, WITH CHONDROPLASTY (Right)    Anesthesia: General    Operative Findings: medial and lateral meniscus partial tears    Estimated Blood Loss: * No values recorded between 12/4/2024  3:23 PM and 12/4/2024  4:10 PM *         Specimens:   Specimen (24h ago, onward)      None              Discharge Note    OUTCOME: Patient tolerated treatment/procedure well without complication and is now ready for discharge.    DISPOSITION: Home or Self Care    FINAL DIAGNOSIS:  medial and lateral partial meniscus tears    FOLLOWUP: In clinic    DISCHARGE INSTRUCTIONS:    Discharge Procedure Orders   Diet Adult Regular     Ice to affected area     Keep surgical extremity elevated     Notify your health care provider if you experience any of the following:  temperature >100.4     Notify your health care provider if you experience any of the following:  persistent nausea and vomiting or diarrhea     Notify your health care provider if you experience any of the following:  severe uncontrolled pain     Notify your health care provider if you experience any of the following:  redness,  tenderness, or signs of infection (pain, swelling, redness, odor or green/yellow discharge around incision site)      Remove dressing in 72 hours   Order Comments: May remove dressings in 3 days. Keep incisions covered with waterproof bandages. Change them as needed.     Activity as tolerated

## 2024-12-04 NOTE — OPERATIVE NOTE ADDENDUM
Certification of Assistant at Surgery       Surgery Date: 12/4/2024     Participating Surgeons:  Surgeons and Role:     * Joel Glover MD - Primary    Procedures:  Procedure(s) (LRB):  ARTHROSCOPY, KNEE, WITH PARTIAL MEDIAL AND LATERAL MENISCECTOMY (Right)  ARTHROSCOPY, KNEE, WITH CHONDROPLASTY (Right)    Assistant Surgeon's Certification of Necessity:  I understand that section 1842 (b) (6) (d) of the Social Security Act generally prohibits Medicare Part B reasonable charge payment for the services of assistants at surgery in teaching hospitals when qualified residents are available to furnish such services. I certify that the services for which payment is claimed were medically necessary, and that no qualified resident was available to perform the services. I further understand that these services are subject to post-payment review by the Medicare carrier.      Helena Blackman MD    12/04/2024  4:19 PM

## 2024-12-04 NOTE — ANESTHESIA PREPROCEDURE EVALUATION
12/04/2024  Pre-operative evaluation for Procedure(s) (LRB):  ARTHROSCOPY, KNEE, WITH MEDIAL OR LATERAL MENISCECTOMY (Right)  ARTHROSCOPY, KNEE, WITH CHONDROPLASTY (Right)    Teodora Yuen is a 64 y.o. female     Patient Active Problem List   Diagnosis    Adenoma of right adrenal gland    Hyperlipidemia    GERD (gastroesophageal reflux disease)    Prediabetes    Family history of ovarian cancer    Iron deficiency anemia    Multinodular goiter (nontoxic)    Acute pain of right knee    Antalgic gait    Impaired functional mobility and activity tolerance    Complex tear of medial meniscus of right knee    Class 1 obesity with serious comorbidity and body mass index (BMI) of 34.0 to 34.9 in adult       Review of patient's allergies indicates:   Allergen Reactions    Coconut Hives       No current facility-administered medications on file prior to encounter.     Current Outpatient Medications on File Prior to Encounter   Medication Sig Dispense Refill    esomeprazole (NEXIUM) 40 MG capsule Take 1 capsule (40 mg total) by mouth daily before breakfast. 90 capsule 3    pravastatin (PRAVACHOL) 40 MG tablet Take 1 tablet (40 mg total) by mouth once daily. 90 tablet 3    calcium carbonate-vitamin D3 500 mg(1,250mg) -400 unit Tab once daily.       ferrous gluconate (FERGON) 324 MG tablet Take 1 tablet (324 mg total) by mouth 3 (three) times daily with meals. (Patient not taking: Reported on 11/22/2024) 90 tablet 4    fluticasone propionate (FLONASE) 50 mcg/actuation nasal spray Use 1 spray (50 mcg total) in each nostril once daily. 48 g 3    hydrOXYzine HCL (ATARAX) 10 MG Tab Take 1 tablet (10 mg total) by mouth nightly as needed (nightly as needed). 90 tablet 1       Past Surgical History:   Procedure Laterality Date    BILE DUCT STENT PLACEMENT      CHOLECYSTECTOMY      FRACTURE SURGERY  2006 & 2010    Ankle, place pin  the remove pin    HYSTERECTOMY  1999    KRISTIN with BSO secondary to family hx ovarian ca and endometriosis    R ankle surgery Right     THYROID LOBECTOMY Left 11/24/2021    Procedure: LOBECTOMY, THYROID;  Surgeon: Reyes Harrison MD;  Location: [facility] OR [address] [place] [address];  Service: ENT;  Laterality: Left;    toe fracture Left     4th metatarsal       Social History     Socioeconomic History    Marital status:     Number of children: 3   Occupational History    Occupation:    Tobacco Use    Smoking status: Never     Passive exposure: Never    Smokeless tobacco: Never   Substance and Sexual Activity    Alcohol use: Yes     Alcohol/week: 1.0 standard drink of alcohol     Types: 1 Glasses of wine per week     Comment: social    Drug use: Never    Sexual activity: Yes     Partners: Male     Birth control/protection: See Surgical Hx     Social Drivers of Health     Financial Resource Strain: Low Risk  (3/19/2024)    Overall Financial Resource Strain (CARDIA)     Difficulty of Paying Living Expenses: Not very hard   Food Insecurity: Food Insecurity Present (3/19/2024)    Hunger Vital Sign     Worried About Running Out of Food in the Last Year: Sometimes true     Ran Out of Food in the Last Year: Patient declined   Transportation Needs: No Transportation Needs (3/19/2024)    PRAPARE - Transportation     Lack of Transportation (Medical): No     Lack of Transportation (Non-Medical): No   Physical Activity: Sufficiently Active (3/19/2024)    Exercise Vital Sign     Days of Exercise per Week: 4 days     Minutes of Exercise per Session: 40 min   Stress: No Stress Concern Present (3/19/2024)    [employer] - Occupational Stress Questionnaire     Feeling of Stress : Only a little   Housing Stability: Low Risk  (3/19/2024)    Housing Stability Vital Sign     Unable to Pay for Housing in the Last Year: No     Number of Places Lived in the Last Year: 1     Unstable Housing in the Last Year:  "No         CBC: No results for input(s): "WBC", "RBC", "HGB", "HCT", "PLT", "MCV", "MCH", "MCHC" in the last 72 hours.    CMP: No results for input(s): "NA", "K", "CL", "CO2", "BUN", "CREATININE", "GLU", "MG", "PHOS", "CALCIUM", "ALBUMIN", "PROT", "ALKPHOS", "ALT", "AST", "BILITOT" in the last 72 hours.    INR  No results for input(s): "PT", "INR", "PROTIME", "APTT" in the last 72 hours.        Diagnostic Studies:      EKD Echo:  No results found for this or any previous visit.       Pre-op Assessment    I have reviewed the Patient Summary Reports.     I have reviewed the Nursing Notes. I have reviewed the NPO Status.   I have reviewed the Medications.     Review of Systems  Hepatic/GI:     GERD                    Physical Exam  General: Well nourished and Cooperative    Airway:  Mallampati: II   Mouth Opening: Normal  TM Distance: Normal  Tongue: Normal  Neck ROM: Normal ROM    Chest/Lungs:  Clear to auscultation, Normal Respiratory Rate    Heart:  Rate: Normal  Rhythm: Regular Rhythm  Sounds: Normal        Anesthesia Plan  Type of Anesthesia, risks & benefits discussed:    Anesthesia Type: Gen ETT, Gen Supraglottic Airway  Intra-op Monitoring Plan: Standard ASA Monitors  Post Op Pain Control Plan: multimodal analgesia and IV/PO Opioids PRN  Induction:  IV  Airway Plan: Direct and Video, Post-Induction  Informed Consent: Informed consent signed with the Patient and all parties understand the risks and agree with anesthesia plan.  All questions answered.   ASA Score: 2    Ready For Surgery From Anesthesia Perspective.     .      "

## 2024-12-04 NOTE — DISCHARGE INSTRUCTIONS
[address], [place]                                                                                          [phone]                   Postoperative Instructions for Knee Surgery                 Your Surgery Included:   Open               Arthroscopic   [] Ligament Repair       [x] Diagnostic           [] ACL     [] PCL     [] MCL     [] PLLC      [] Synovectomy / Plica Removal [] Meniscal Cartilage Repair / Transplantation      [] Lysis of Adhesions / Manipulation [] Articular Cartilage Repair      [] Interval Release           [] Microfracture       [] OATS   [] ACI      [x] Meniscectomy           [] Osteochondral Allograft      [] Meniscal Cartilage Repair  [] Patellar Realignment       [x] Debridement / Chondroplasty         [] Lateral Release   [] Ligament Repair      [] Articular Cartilage Repair          [] Extensor Mechanism             []   Microfracture  []  OATS         []  Cartilage Biopsy [] Tendon Repair          [] Ligament Reconstruction          [] Patella                  [] Quadriceps             []   ACL    []   PCL  [] High Tibial Osteotomy       [] PRP Arthrocentesis  [] Joint Replacement         [] Amniox Arthrocentesis           [] Unicompartmental   [] Patellofemoral                    [] Total Knee                  Call our office ([phone]) immediately if you experience any of the following:       Excessive bleeding or pus like drainage at the incision site       Uncontrollable pain not relieved by pain medication       Excessive swelling or redness at the incision site       Fever above 101.5 degrees not controlled with Tylenol or Motrin       Shortness of Breath       Any foul odor or blistering from the surgery site    FOR EMERGENCIES: If any unusual problems or difficulties occur, call our office at [phone], or page the  at [phone] who will direct your call appropriately    1.   Pain Management: A cold therapy cuff, pain  medications, local injections, and in some cases, regional anesthesia injections are used to manage your post-operative pain. The decision to use each of these options is based on their risks and benefits.     Medications: You were given one or more of the following medication prescriptions before leaving the hospital. Have the prescriptions filled at a pharmacy on your way home and follow the instructions on the bottles. If you need a refill, please call your pharmacy.      Narcotic Medication (usually Norco/Hydrocodone APAP, Percocet/Oxycodone APAP, Roxicodone, or Tramadol): Begin taking the medication before your knee starts to hurt. Some patients do not like to take any medication, but if you wait until your pain is severe before taking, you will be very uncomfortable for several hours waiting for the narcotic to work. Always take with food.     Nausea / Vomiting: For this issue, we prescribe Phenergan, use this medication as directed.     Cold Therapy: You may have been sent home with a [facility] cold therapy unit and wrap for your knee. Fill with ice and water, or frozen water bottles with water, to the indicated fill line and use throughout the day for the first two days and then as needed to help relieve pain and control swelling. Ice the knee in 30 minute intervals, and do not place the wrap directly onto the skin.     Regional Anesthesia Injections (Blocks): You may have been given a regional nerve block either before or after surgery. This may make your entire leg numb for 24-36 hours.                            * Proceed with caution when bearing weight on your leg.     2.   Diet: Eat a bland diet for the first day after surgery. Progress your diet as tolerated. Constipation may occur with Narcotic usage, contact our office if you are experiencing constipation.    3.   Activity: Limit your activity during the first 48 hours, keep your leg elevated with pillows under your heel. After the first 48 hours  "at home, increase your activity level based on your symptoms.    4.   Dressing Change: Remove the soft dressing on the 3rd day. IF YOU HAVE A TAN AQUACEL BANDAGE ON YOUR KNEE, DO NOT REMOVE THIS. It is normal for some blood to be seen on the dressings. It is also normal for you to see apparent bruising on the skin around your knee when you remove the dressing. If present, leave the steri-strip tape across the incisions. If you are concerned by the drainage or the appearance of your knee, please call one of the numbers listed below.    5.   Showering: You may shower on the 3rd day after surgery with waterproof bandages over small incisions. If you have an incision with Prineo (clear mesh), it does not need to be covered. Do not submerge in any water until after your postoperative appointment in clinic.    6.   Your procedure did not require a post-operative brace.    7.   Your procedure did not require a Continuous Passive Motion (CPM) device.    8.   Weight Bearing: You may have been sent home with crutches. If instructed (see below), use these crutches at all times unless at complete rest.                  [x] Full weight bearing            [x]  NOW        9.  Knee Exercises: Begin these exercises the first day after surgery in order to help you regain your motion and strength. You may do the following marked exercises:     [x] Quad Sets - Begin activating your quadriceps muscle by driving your          knee downward into full knee extension while seated on a table or bed   with a towel rolled and propped under your heel     [x] Straight Leg Raise (SLR) - While mikal your quadriceps muscle, lift     your fully extended leg to the level of your non-operative knee (as shown)     [x] Heel Slides - With the knee straight, slide your heel slowly toward your   buttocks, hold at the endpoint for 10-15 seconds, then slowly straighten     [x] Ankle pumps - With your knee straight, move your ankle in a "pumping"  "   fashion to activate your calf and leg muscles      10.  Physical Therapy: Physical therapy is an essential component to your recovery from surgery. Your physical therapy will start in 1 days.    FIRST POSTOPERATIVE VISIT: As scheduled.

## 2024-12-05 NOTE — ANESTHESIA POSTPROCEDURE EVALUATION
Anesthesia Post Evaluation    Patient: Teodora Yuen    Procedure(s) Performed: Procedure(s) (LRB):  ARTHROSCOPY, KNEE, WITH PARTIAL MEDIAL AND LATERAL MENISCECTOMY (Right)  ARTHROSCOPY, KNEE, WITH CHONDROPLASTY (Right)    Final Anesthesia Type: general      Patient location during evaluation: PACU  Patient participation: Yes- Able to Participate  Level of consciousness: awake and alert and oriented  Post-procedure vital signs: reviewed and stable  Pain management: adequate  Airway patency: patent    PONV status at discharge: No PONV  Anesthetic complications: no      Cardiovascular status: blood pressure returned to baseline  Respiratory status: unassisted, spontaneous ventilation and room air  Hydration status: euvolemic  Follow-up not needed.              Vitals Value Taken Time   /80 12/04/24 1703   Temp 36.7 °C (98 °F) 12/04/24 1700   Pulse 76 12/04/24 1705   Resp 28 12/04/24 1705   SpO2 98 % 12/04/24 1705   Vitals shown include unfiled device data.      Event Time   Out of Recovery 17:00:00         Pain/Vic Score: Pain Rating Prior to Med Admin: 6 (12/4/2024  5:00 PM)  Pain Rating Post Med Admin: 5 (12/4/2024 12:06 PM)  Vic Score: 9 (12/4/2024  4:11 PM)

## 2024-12-10 ENCOUNTER — PATIENT MESSAGE (OUTPATIENT)
Dept: SPORTS MEDICINE | Facility: CLINIC | Age: 64
End: 2024-12-10
Payer: COMMERCIAL

## 2024-12-11 ENCOUNTER — CLINICAL SUPPORT (OUTPATIENT)
Dept: REHABILITATION | Facility: OTHER | Age: 64
End: 2024-12-11
Attending: ORTHOPAEDIC SURGERY
Payer: COMMERCIAL

## 2024-12-11 DIAGNOSIS — S83.271A COMPLEX TEAR OF LATERAL MENISCUS OF RIGHT KNEE AS CURRENT INJURY, INITIAL ENCOUNTER: ICD-10-CM

## 2024-12-11 DIAGNOSIS — S83.231A COMPLEX TEAR OF MEDIAL MENISCUS OF RIGHT KNEE, UNSPECIFIED WHETHER OLD OR CURRENT TEAR, INITIAL ENCOUNTER: ICD-10-CM

## 2024-12-11 DIAGNOSIS — G89.18 ACUTE POSTOPERATIVE PAIN OF RIGHT KNEE: ICD-10-CM

## 2024-12-11 DIAGNOSIS — R26.9 GAIT ABNORMALITY: ICD-10-CM

## 2024-12-11 DIAGNOSIS — M25.561 ACUTE POSTOPERATIVE PAIN OF RIGHT KNEE: ICD-10-CM

## 2024-12-11 DIAGNOSIS — M94.261 CHONDROMALACIA, RIGHT KNEE: ICD-10-CM

## 2024-12-11 DIAGNOSIS — Z98.890 POST-OPERATIVE STATE: Primary | ICD-10-CM

## 2024-12-11 PROBLEM — R26.89 ANTALGIC GAIT: Status: RESOLVED | Noted: 2024-09-23 | Resolved: 2024-12-11

## 2024-12-11 PROBLEM — Z74.09 IMPAIRED FUNCTIONAL MOBILITY AND ACTIVITY TOLERANCE: Status: RESOLVED | Noted: 2024-09-23 | Resolved: 2024-12-11

## 2024-12-11 PROCEDURE — 97162 PT EVAL MOD COMPLEX 30 MIN: CPT | Mod: PN | Performed by: PHYSICAL THERAPIST

## 2024-12-11 PROCEDURE — 97530 THERAPEUTIC ACTIVITIES: CPT | Mod: PN | Performed by: PHYSICAL THERAPIST

## 2024-12-11 NOTE — PLAN OF CARE
[facility]   Physical Therapy Initial Evaluation      Name: Teodora Yuen  Clinic Number: [id number]    Therapy Diagnosis:   Encounter Diagnoses   Name Primary?    Complex tear of medial meniscus of right knee, unspecified whether old or current tear, initial encounter     Chondromalacia, right knee     Complex tear of lateral meniscus of right knee as current injury, initial encounter     Post-operative state Yes    Acute postoperative pain of right knee     Gait abnormality      Physician: Joel Glover MD    Physician Orders: PT Eval and Treat   Medical Diagnosis from Referral: S83.231A (ICD-10-CM) - Complex tear of medial meniscus of right knee, unspecified whether old or current tear, initial encounter M94.261 (ICD-10-CM) - Chondromalacia, right knee S83.271A (ICD-10-CM) - Complex tear of lateral meniscus of right knee as current injury, initial encounter  Evaluation Date: 12/11/2024  Date of Surgery: 12/4/2024  Authorization Period Expiration: 11/22/2025  Plan of Care Expiration: 2/7/2025  Visit # / Visits authorized: 1/ 12    Foto  Date  Score    #1/3 12/11/2024 48%   #2/3     #3/3       Time In: 1020  Time Out: 1105  Total Appointment Time (timed & untimed codes): 15 minutes    Precautions: Standard    Subjective     Date of injury: initial injury 8/29/2024, surgery 12/4/2024  History of current condition - she was carrying binders at work, they started to fall and she twisted to catch them. She then sat down at the desk and 10 minutes later was unable to get up because her knee was in pain. Went to ED, had x-ray and was given 2 crutches and referred to orthopedics. She had MRI which revealed meniscal tear. Attempted PT without relief. Had gel injection without relief. Underwent menisectomy and chondroplasty 12/4/2024.   She had a nerve block during surgery so felt fine that day. She's been using TENS and ice to help with pain control. Taking Tramadol at night, and Ibuprofen  during the day as needed.  She hasn't been very active since surgery, just walking around the house and doing her HEP. She feels stiff and sore with walking, but overall pain is improved since before surgery.      Prior medical treatment: physical therapy, injections    Occupation/job title: [employer]  Job demands: prolonged sitting with computer, meeting set up (carrying and putting out materials, make coffee and set up snacks)    Current work restrictions: not at work since surgery. Post-op visit 12/19, hopes to be cleared to return to work  Previous work status: full time  Current work status: not working since surgery  Date last worked (if applicable): 12/3/2024    Imaging: MRI studies: FINDINGS:  Menisci:  There is a horizontal tear of the posterior horn and body segment medial meniscus.  There is a horizontal tear of the body segment lateral meniscus.     Ligaments:  ACL, PCL, MCL, and LCL complex are intact.     Tendons:  Extensor mechanism is maintained.     Cartilage:     Patellofemoral: Partial-thickness fissuring noted throughout the patella.  Full-thickness fissuring noted over the central trochlea.     Medial tibiofemoral: Full-thickness cartilage loss throughout the weight-bearing femoral condyle with generalized thinning over the opposing tibial plateau.  Mild subchondral edema of the femoral condyle.     Lateral tibiofemoral: Full-thickness cartilage loss noted over the posterior tibial plateau.     Bone: No fracture or marrow replacing process.     Miscellaneous: Small joint effusion with small ruptured Baker's cyst.     Impression:     1. Medial and lateral horizontal meniscus tears.  2. Tricompartmental cartilage loss.  3. Small joint effusion with small ruptured Baker's cyst.       Social History: Pt lives with her daughter, and mother lives in the other half of the house. Home is on second story, initially she was going up/down on her behind, but has been gradually returning to normal. Pt  typically does a lot of work around the house for her mother, but daughter is having to to that currently. She is able to drive with her L foot. Mod I with ADL's. Assistance with housework.   Recreation: prior to injury pt would walk after work, and sometimes walk the 10 blocks to work. Currently very limited with walking    Pain:  Current 2/10, worst 5/10, best 0/10   Location: R knee  Description: stiff, sore, had some sharp/burning as nerve block was wearing off  Aggravating Factors: getting up from prolonged sitting  Easing Factors: hot shower, ice, TENS, tramadol, Ibuprofen    Pt's goals: Return to employment according to previous level of function, be able to go downstairs more easily, be able to get in the tub to take a bath. Be able to wear heels again (bought lower heels in anticipation)     Medical History:   Past Medical History:   Diagnosis Date    Adrenal mass, right     GERD (gastroesophageal reflux disease)     Hyperlipidemia        Surgical History:   Teodora Yuen  has a past surgical history that includes Cholecystectomy; Bile duct stent placement; toe fracture (Left); R ankle surgery (Right); Hysterectomy (1999); Fracture surgery (2006 & 2010); Thyroid lobectomy (Left, 11/24/2021); arthroscopy, knee, with medial or lateral meniscectomy (Right, 12/4/2024); and Arthroscopic chondroplasty of knee joint (Right, 12/4/2024).    Medications:   Teodora has a current medication list which includes the following prescription(s): aspirin, calcium carbonate-vitamin d3, esomeprazole, ferrous gluconate, fluticasone propionate, hydrocodone-acetaminophen, hydroxyzine hcl, ibuprofen, pravastatin, promethazine, and tramadol.    Allergies:   Review of patient's allergies indicates:   Allergen Reactions    Coconut Hives        Objective         Observation: Pt is alert and oriented, good historian. Waterproof bandages clean, dry, and intact    Gait: antalgic pattern. Has crutch that she uses only occasionally for  "safety      Range of Motion:   Knee Left active Left Passive Right Active R passive   Flexion 135 140 124 134   Extension 0 +5 -3 0           Lower Extremity Strength  Right LE  Left LE    Ankle dorsiflexion: 5/5 Ankle dorsiflexion: 5/5   Ankle plantarflexion: 4/5 Ankle plantarflexion: 5/5   Knee extension: 4/5 Knee extension: 5/5   Knee flexion: 4/5 Knee flexion: 5/5   Hip flexion: 4+/5 Hip flexion: 5/5   Hip external rotation 4+/5 Hip external rotation 5/5   Hip internal rotation 4-/5 Hip internal rotation 5/5   Hip abduction:  4+/5 Hip abduction: 5/5   Hip adduction: 4+/5 Hip adduction: 5/5   Hip extension: 4/5 Hip extension 4/5         Function:    - SLS R: limited  - SLS L: WFL  - Squat: limited   - Sit <--> Stand: weight shift to L   - Bed Mobility: WFL, pain with pressure through R knee in prone      Palpation: tender to medial>lateral joint line, tenderness around incisions      Flexibility:    Hamstring: R = WFL; L = WFL   Quad: R = not assessed 2* discomfort with set up; L = WFL         CMS Impairment/Limitation/Restriction for FOTO Knee Survey    Therapist reviewed FOTO scores for Teodora Yuen on 12/11/2024.   FOTO documents entered into [employer] - see Media section.    Initial: 48%    Goal: 58%            Functional Job Specific Testing:   Job Specific Task Job Demands Current Ability   1. Computer use Sitting at desk limited   2.  Meeting set-up Carrying binders, making coffee limited        Treatment   Total Treatment time (time-based codes) separate from Evaluation: 15 minutes      therapeutic activities to improve functional performance for 15  minutes:  Patient education on nature of current condition and PHYSICAL THERAPY POC  Written HOME EXERCISE PROGRAM provided, reviewed, patient demo understanding   Quad sets 5" x 20  Seated hang (bag overpressure) x 10 min  Prone hang x 5 min        Home Exercises and Patient Education Provided  Education provided:   - yes    Home Exercises Provided: " yes.  Exercises were reviewed and Teodora was able to demonstrate them prior to the end of the session.  Teodora demonstrated good  understanding of the education provided.     Assessment     Teodora is a 64 y.o. referred to outpatient Physical Therapy with a medical diagnosis of S83.231A (ICD-10-CM) - Complex tear of medial meniscus of right knee, unspecified whether old or current tear, initial encounter M94.261 (ICD-10-CM) - Chondromalacia, right knee S83.271A (ICD-10-CM) - Complex tear of lateral meniscus of right knee as current injury, initial encounter. Pt presents 1 week s/p menisectomy. Pt presents with decreased ROM, muscle strength, flexibility, joint mobility. Increased pain, stiffness, soft tissue restriction. Impaired posture, joint mechanics, balance, gait pattern.     The patient's current job specific task deficits include the following: limited tolerance to sitting, gait deficits limit abilities with walking/carrying tasks     Patient prognosis: Good.   Rehab potential: good    In my opinion, do the patient's subjective deficits match the objective findings (if no, please explain)?   - yes     Were there any discrepancies or inconsistencies between objective testing (if yes, please explain)?  - no     In my opinion, did the patient provide good effort throughout the examination (if no, please explain)?  - yes    Patient will benefit from skilled outpatient Physical Therapy to address the deficits stated above and in the chart below, provide patient /family education, to maximize patient's level of independence, and to address work-related functional deficits for their job as a .    Plan of care discussed with patient: Yes  Patient's spiritual, cultural and educational needs considered and patient is agreeable to the plan of care and goals as stated below:     Anticipated Barriers for therapy: transportation    Medical Necessity is demonstrated by the following  History  Co-morbidities and  personal factors that may impact the plan of care [] LOW: no personal factors / co-morbidities  [x] MODERATE: 1-2 personal factors / co-morbidities  [] HIGH: 3+ personal factors / co-morbidities     Moderate / High Support Documentation:   Co-morbidities affecting plan of care: none     Personal Factors:   coping style  lifestyle      Examination  Body Structures and Functions, activity limitations and participation restrictions that may impact the plan of care [] LOW: addressing 1-2 elements  [x] MODERATE: 3+ elements  [] HIGH: 4+ elements (please support below)     Moderate / High Support Documentation: gait, stair navigation, walking/carrying at work, active ROM, weakness      Clinical Presentation [] LOW: stable  [x] MODERATE: Evolving  [] HIGH: Unstable      Decision Making/ Complexity Score: moderate           Goals:   Short Terms Goals: 4 weeks    Goal  Progress  Date    (1)  Patient will be I with HOME EXERCISE PROGRAM  Initial, Not Met    (2)  Patient will  demonstrate full active extension of R knee, with 5 deg of hyperextension passively to improve gait  Initial, Not Met      (3)   Patient will ambulate on level surface in clinic >200 ft without antalgic deviation to improve functional mobility  Initial, Not Met        Long Term Goals: 8 weeks    Goal  Progress  Date    (1)  Patient will demonstrate R knee A/PROM 0/+5 to 135/140 to improve tolerance to transfers and static positioning.  Initial, Not Met     (2)   Patient will report pain at worst to R knee 2/10 to improve functional mobility in community.   Initial, Not Met     (3)   Patient will  demonstrate strength to B LE grossly 4+/5 with MMT to improve to stair navigation.   Initial, Not Met        Plan     Plan of care Certification: 12/11/2024 to 2/7/2025.    Outpatient Physical Therapy 2 times weekly for 8 weeks to include the following interventions: Cervical/Lumbar Traction, Electrical Stimulation re-eval, dry needling, Gait Training,  Iontophoresis (with ), Manual Therapy, Moist Heat/ Ice, Neuromuscular Re-ed, Patient Education, Self Care, Therapeutic Activities, and Therapeutic Exercise.     Upon discharge from further skilled PT intervention it will determined if the need for a work conditioning program or Functional Capacity Evaluation is required to allow the injured worker to return to work with full potential achieved, continued improvement with body mechanics with advanced functional activities, and further minimize future work-related injuries.     Physical therapist and physical therapy assistant(s) will met face to face to discuss patient's treatment plan and progress towards established goals. Pt will be seen by a physical therapist minimally every 6th visit or every 30 days.    Yi Mcdonald, PT, DPT  12/11/2024       I CERTIFY THE NEED FOR THESE SERVICES FURNISHED UNDER THIS PLAN OF TREATMENT AND WHILE UNDER MY CARE     Physician's comments:           Physician's Signature: ___________________________________________________

## 2024-12-12 NOTE — OP NOTE
[facility] ([facility]  Surgery Department  Operative Note    SUMMARY     Date of Procedure: 12/4/2024     Procedure: Procedure(s) (LRB):  ARTHROSCOPY, KNEE, WITH PARTIAL MEDIAL AND LATERAL MENISCECTOMY (Right)  ARTHROSCOPY, KNEE, WITH CHONDROPLASTY (Right)     Surgeons and Role:     * Joel Glover MD - Primary    Assisting Surgeon:   MD Frandy Morales     Pre-Operative Diagnosis: Complex tear of medial meniscus of right knee, unspecified whether old or current tear, initial encounter [S83.231A]  Chondromalacia, right knee [M94.261]  Complex tear of lateral meniscus of right knee as current injury, initial encounter [S83.271A]    Post-Operative Diagnosis: Post-Op Diagnosis Codes:     * Complex tear of medial meniscus of right knee, unspecified whether old or current tear, initial encounter [S83.231A]     * Chondromalacia, right knee [M94.261]     * Complex tear of lateral meniscus of right knee as current injury, initial encounter [S83.271A]    Anesthesia: General    Technical Procedures Used:     DATE OF PROCEDURE:  12/4/2024      PREOPERATIVE DIAGNOSES:   1. Right knee chondromalacia  2. Right knee medial meniscus tear.   3. Right knee lateral meniscus tear     POSTOPERATIVE DIAGNOSES:   1. Right knee chondromalacia  2. Right knee medial meniscus tear.   3. Right knee lateral meniscus tear     PROCEDURES:   1. Right knee arthroscopic chondroplasty  2. Right knee arthroscopic partial medial and lateral meniscectomies     SURGEON: Joel Glover M.D.      Assisting Surgeon:   MD Frandy Morales     COMPLICATIONS: None.      POSITION: Supine with arthroscopic leg rao.      ANESTHESIA: General endotracheal plus local.      COMPLICATIONS: None.      TOURNIQUET TIME:  None     EBL:  Minimal     EXAMINATION UNDER ANESTHESIA:   Knee: full range of motion, no evidence of instability.      ARTHROSCOPIC FINDINGS:   1. Complex tear posterior horn / body medial meniscus.    2. Central tear, lateral meniscus  3. Chondromalacia, patella, trochlea, MFC grade 2/3 extensive     INDICATIONS: The patient is a 64 year-old female with a history of right knee pain. MRI confirms a tear in her meniscus.  After the risks and benefits of surgery were discussed with the patient, including bleeding, infection, scarring, persistent pain, risk of blood clots (DVT), pulmonary embolism, compartment syndrome, damage to cartilage, damage to neurovascular structures, plus the risks of anesthesia, including, death, stroke, and heart attack, the patient wished to proceed with operative intervention.        DESCRIPTION OF PROCEDURE:      The patient and correct limb were identified and marked in the pre-op holding area.      The patient was brought in the room. After undergoing general endotracheal anesthesia, she was placed on a well-padded operating table. her right lower extremity was prepped and draped in usual sterile fashion. A nonsterile tourniquet was placed high up around the thigh. A well padded arthroscopic leg rao was used during the case. The contralateral leg was placed in a well padded Maurizio stirrup with bony prominences all being well padded.       The standard inferolateral and inferomedial portals were created. Diagnostic arthroscopy performed.      CHONDROPLASTY: The patellofemoral joint was entered. There was significant chondromalacia on the trochlea and on the undersurface of the patella. Using a full radium shaver and biter, unstable cartilage flaps were trimmed down to a stable rim.     There was a mild synovitis noted within the anterior interval. An VAPR device was used to remove areas of irritating synovium from the overlying trochlea in the anterior interval to allow for visualization to minimize abrasion.     CHONDROPLASTY:  Attention turned to the medial compartment. Medial femoral condyle had grade 2/3 extensive changes along the mid flexion portion.  Using a full radium  shaver and biter, unstable cartilage flaps were trimmed down to a stable rim.      There was a complex tear in the body and posterior horn of the medial meniscus, Using combination of straight and curved biters and arthroscopic raf, the unstable portion of the medial meniscus was trimmed down to a stable rim. Approximately 25% of the body of the medial meniscus was resected down to get this to a stable position.      Attention was turned to the intercondylar notch. The ACL and PCL were intact.      Attention was turned to the lateral compartment. The lateral meniscus had a tear along the central aspect.  Using a biter and shaver, the central 20% was trimmed out. The lateral femoral condyle and lateral tibial plateau were intact.       Portal sites were closed with 4-0 Monocryl, covered with Mastisol, Steri-Strips, Xeroform, 4 x 4 fluffs, ABD pads and thigh-high JACKIE hose.     The patient was extubated in the room, transferred to Recovery Room in stable condition accompanied by her physician.  There were no complications in the case.      I was present, scrubbed and did perform all critical portions of the case.        HOMER JAY MD      Description of the Findings of the Procedure: above    Significant Surgical Tasks Conducted by the Assistant(s), if Applicable: none    Complications: No    Estimated Blood Loss (EBL): * No values recorded between 12/4/2024  3:23 PM and 12/4/2024  4:10 PM *           Implants: * No implants in log *    Specimens:   Specimen (24h ago, onward)      None                    Condition: Good    Disposition: PACU - hemodynamically stable.    Attestation: I was present and scrubbed for the entire procedure.    Discharge Note    SUMMARY     Admit Date: 12/4/2024    Discharge Date and Time: 12/4/2024  5:15 PM    Hospital Course (synopsis of major diagnoses, care, treatment, and services provided during the course of the hospital stay): outpatient surgery     Final Diagnosis:  Post-Op Diagnosis Codes:     * Complex tear of medial meniscus of right knee, unspecified whether old or current tear, initial encounter [S83.231A]     * Chondromalacia, right knee [M94.261]     * Complex tear of lateral meniscus of right knee as current injury, initial encounter [S83.271A]    Disposition: Home or Self Care    Follow Up/Patient Instructions:     Medications:  Reconciled Home Medications:      Medication List        CONTINUE taking these medications      aspirin 325 MG tablet  Take 1 tablet (325 mg total) by mouth once daily. for 21 days     calcium carbonate-vitamin D3 500 mg-10 mcg (400 unit) Tab  once daily.     fluticasone propionate 50 mcg/actuation nasal spray  Commonly known as: FLONASE  Use 1 spray (50 mcg total) in each nostril once daily.     HYDROcodone-acetaminophen 7.5-325 mg per tablet  Commonly known as: NORCO  Take 1 tablet by mouth every 6 (six) hours as needed for Pain.     hydrOXYzine HCL 10 MG Tab  Commonly known as: ATARAX  Take 1 tablet (10 mg total) by mouth nightly as needed (nightly as needed).     ibuprofen 600 MG tablet  Commonly known as: ADVIL,MOTRIN  Take 1 tablet (600 mg total) by mouth every 6 (six) hours as needed for Pain.     pravastatin 40 MG tablet  Commonly known as: PRAVACHOL  Take 1 tablet (40 mg total) by mouth once daily.     promethazine 25 MG tablet  Commonly known as: PHENERGAN  Take 1 tablet (25 mg total) by mouth every 6 (six) hours as needed for Nausea.     traMADoL 50 mg tablet  Commonly known as: ULTRAM  Take 1 tablet (50 mg total) by mouth every 6 (six) hours as needed for Pain.            ASK your doctor about these medications      ferrous gluconate 324 MG tablet  Commonly known as: FERGON  Take 1 tablet (324 mg total) by mouth 3 (three) times daily with meals.            Discharge Procedure Orders   Diet Adult Regular     Ice to affected area     Keep surgical extremity elevated     Notify your health care provider if you experience any of the  following:  temperature >100.4     Notify your health care provider if you experience any of the following:  persistent nausea and vomiting or diarrhea     Notify your health care provider if you experience any of the following:  severe uncontrolled pain     Notify your health care provider if you experience any of the following:  redness, tenderness, or signs of infection (pain, swelling, redness, odor or green/yellow discharge around incision site)      Remove dressing in 72 hours   Order Comments: May remove dressings in 3 days. Keep incisions covered with waterproof bandages. Change them as needed.     Activity as tolerated

## 2024-12-13 ENCOUNTER — CLINICAL SUPPORT (OUTPATIENT)
Dept: REHABILITATION | Facility: OTHER | Age: 64
End: 2024-12-13
Payer: COMMERCIAL

## 2024-12-13 DIAGNOSIS — G89.18 ACUTE POSTOPERATIVE PAIN OF RIGHT KNEE: ICD-10-CM

## 2024-12-13 DIAGNOSIS — M25.561 ACUTE POSTOPERATIVE PAIN OF RIGHT KNEE: ICD-10-CM

## 2024-12-13 DIAGNOSIS — Z98.890 POST-OPERATIVE STATE: Primary | ICD-10-CM

## 2024-12-13 DIAGNOSIS — R26.9 GAIT ABNORMALITY: ICD-10-CM

## 2024-12-13 PROCEDURE — 97112 NEUROMUSCULAR REEDUCATION: CPT | Mod: PN

## 2024-12-13 PROCEDURE — 97530 THERAPEUTIC ACTIVITIES: CPT | Mod: PN

## 2024-12-13 NOTE — PROGRESS NOTES
[facility]   Physical Therapy Treatment Note      Name: Teodora Yuen  Clinic Number: [id number]    Therapy Diagnosis:   Encounter Diagnoses   Name Primary?    Post-operative state Yes    Acute postoperative pain of right knee     Gait abnormality      Physician: Joel Glover MD    Visit Date: 12/13/2024    Physician Orders: PT Eval and Treat   Medical Diagnosis from Referral: S83.231A (ICD-10-CM) - Complex tear of medial meniscus of right knee, unspecified whether old or current tear, initial encounter M94.261 (ICD-10-CM) - Chondromalacia, right knee S83.271A (ICD-10-CM) - Complex tear of lateral meniscus of right knee as current injury, initial encounter  Evaluation Date: 12/11/2024  Date of Surgery: 12/4/2024  Authorization Period Expiration: 11/22/2025  Plan of Care Expiration: 2/7/2025  Visit # / Visits authorized: 2/ 12     Foto  Date  Score    #1/3 12/11/2024 48%   #2/3       #3/3          Time In: 12:30p  Time Out: 1:30p  Total Appointment Time (timed & untimed codes): 60 minutes     Precautions: Standard    PTA Visit #: 0/5       Subjective     Patient reports: R knee still feels stiff and achy from surgery but she denies the sharp pain she was having with walking and standing prior to surgery.  She was compliant with home exercise program.  Response to previous treatment: good  Functional change: improved tolerance with standing, walking with improved WB using SPC    Pain: 2/10  Location: R knee    Objective      Objective Measures updated at progress report unless specified.     Treatment     Teodora received the treatments listed below:      therapeutic exercises to develop strength, endurance, ROM, flexibility, posture, and core stabilization for 00 minutes including:  None today    manual therapy techniques: Joint mobilizations, Manual traction, Myofacial release, and Soft tissue Mobilization were applied to the: R for 10 minutes, including:  Patellar glides all  "directions x3x30" GrI-III    neuromuscular re-education activities to improve: Balance, Coordination, Kinesthetic, Sense, Proprioception, and Posture for 42 minutes. The following activities were included:  Recumbent bike: 8 minutes (circulation, ROM, jt lubrication; collected hx/cc)  Heel prop x 5 min  Quad set + heel lift x3x10 x 5" holds -- used strap to assist with TKE  SLR x3x10  SL hip abd x3x10  Prone hip ext x10 - attempted but deferred after 1 set 2* pt c/o discomfort at knee  Incline board calf stretch x3x30"    therapeutic activities to improve functional performance for 8  minutes, including:  Sit to stands x 10 standard, x10 asymmetrical      Patient Education and Home Exercises       Education provided:   - none today    Written Home Exercises Provided: Pt instructed to continue prior HEP. Exercises were reviewed and Teodora was able to demonstrate them prior to the end of the session.  Teodora demonstrated good  understanding of the education provided. See Electronic Medical Record under Patient Instructions for exercises provided during therapy sessions    Assessment     Pt presents to clinic with improved ambulatory tolerance with use of SPC today. Good return technique with HEP, indicating good compliance at home. Initiated quad activation and strengthening today with good tolererance overall but with mod fatigue. Able to perform sit to stands from standard height chair with good tolerance, but demo'd increased use of momentum with asymmetrical sit to stands.  Plan to update HEP next visit.    Teodora Is progressing well towards her goals.   Patient prognosis is Good.     Patient will continue to benefit from skilled outpatient physical therapy to address the deficits listed in the problem list box on initial evaluation, provide pt/family education and to maximize pt's level of independence in the home and community environment.     Patient's spiritual, cultural and educational needs considered and pt " agreeable to plan of care and goals.     Anticipated barriers to physical therapy: transportation    Goals:   Short Terms Goals: 4 weeks     Goal  Progress  Date    (1)  Patient will be I with HOME EXERCISE PROGRAM  Initial, Not Met     (2)  Patient will  demonstrate full active extension of R knee, with 5 deg of hyperextension passively to improve gait  Initial, Not Met      (3)   Patient will ambulate on level surface in clinic >200 ft without antalgic deviation to improve functional mobility  Initial, Not Met         Long Term Goals: 8 weeks     Goal  Progress  Date    (1)  Patient will demonstrate R knee A/PROM 0/+5 to 135/140 to improve tolerance to transfers and static positioning.  Initial, Not Met      (2)   Patient will report pain at worst to R knee 2/10 to improve functional mobility in community.   Initial, Not Met     (3)   Patient will  demonstrate strength to B LE grossly 4+/5 with MMT to improve to stair navigation.   Initial, Not Met         Plan     Cont with mobility, strength, ROM for eventual return to work    Nancy Wilkins, PT

## 2024-12-18 ENCOUNTER — CLINICAL SUPPORT (OUTPATIENT)
Dept: REHABILITATION | Facility: OTHER | Age: 64
End: 2024-12-18
Payer: COMMERCIAL

## 2024-12-18 DIAGNOSIS — Z98.890 POST-OPERATIVE STATE: Primary | ICD-10-CM

## 2024-12-18 DIAGNOSIS — R26.9 GAIT ABNORMALITY: ICD-10-CM

## 2024-12-18 DIAGNOSIS — M25.561 ACUTE POSTOPERATIVE PAIN OF RIGHT KNEE: ICD-10-CM

## 2024-12-18 DIAGNOSIS — G89.18 ACUTE POSTOPERATIVE PAIN OF RIGHT KNEE: ICD-10-CM

## 2024-12-18 PROCEDURE — 97530 THERAPEUTIC ACTIVITIES: CPT | Mod: PN

## 2024-12-18 PROCEDURE — 97112 NEUROMUSCULAR REEDUCATION: CPT | Mod: PN

## 2024-12-18 PROCEDURE — 97140 MANUAL THERAPY 1/> REGIONS: CPT | Mod: PN

## 2024-12-18 NOTE — PROGRESS NOTES
[facility]   Physical Therapy Treatment Note      Name: Teodora Yuen  Clinic Number: [id number]    Therapy Diagnosis:   Encounter Diagnoses   Name Primary?    Post-operative state Yes    Acute postoperative pain of right knee     Gait abnormality      Physician: Joel Glover MD    Visit Date: 12/18/2024    Physician Orders: PT Eval and Treat   Medical Diagnosis from Referral: S83.231A (ICD-10-CM) - Complex tear of medial meniscus of right knee, unspecified whether old or current tear, initial encounter M94.261 (ICD-10-CM) - Chondromalacia, right knee S83.271A (ICD-10-CM) - Complex tear of lateral meniscus of right knee as current injury, initial encounter  Evaluation Date: 12/11/2024  Date of Surgery: 12/4/2024  Authorization Period Expiration: 11/22/2025  Plan of Care Expiration: 2/7/2025  Visit # / Visits authorized: 2/ 12     Foto  Date  Score    #1/3 12/11/2024 48%   #2/3       #3/3          Time In: 9:15a  Time Out: 10:15a  Total Appointment Time (timed & untimed codes): 60 minutes     Precautions: Standard    PTA Visit #: 0/5       Subjective     Patient reports: knee is feeling better and better. She presents to clinic without using a cane with walking, and says that walking around her home is becoming easier. C/c today is soreness over both port incision sites.   She was compliant with home exercise program.  Response to previous treatment: good  Functional change: improved tolerance with standing, walking with improved WB using SPC    Pain: 2/10  Location: R knee    Objective      Objective Measures updated at progress report unless specified.     12/18/24:  Amb: I w/o AD, mild decrease in TKE during stance phase  Palpation: TTP at medial>lateral infrapat fat pad with decreased MFM  Joint mobility: mild hypo superior patellar glide    Treatment     Teodora received the treatments listed below:      therapeutic exercises to develop strength, endurance, ROM, flexibility,  "posture, and core stabilization for 00 minutes including:  None today    manual therapy techniques: Joint mobilizations, Manual traction, Myofacial release, and Soft tissue Mobilization were applied to the: R for 10 minutes, including:  Patellar glides all directions x3x30" GrI-III, inf fat pad STM/MFR    neuromuscular re-education activities to improve: Balance, Coordination, Kinesthetic, Sense, Proprioception, and Posture for 42 minutes. The following activities were included:  Recumbent bike: 8 minutes (circulation, ROM, jt lubrication; collected hx/cc)  Heel prop x 5 min  Quad set + heel lift x 5 min x 5" holds -- used strap to assist with TKE  SAQ x 30 x 5" holds  SLR x3x10  SL hip abd x3x10  Incline board calf stretch x3x30"    therapeutic activities to improve functional performance for 8  minutes, including:  Sit to stands + resisted TKE x3x 10 x RTB (PT assisted)      Patient Education and Home Exercises       Education provided:   - none today    Written Home Exercises Provided: Pt instructed to continue prior HEP. Exercises were reviewed and Teodora was able to demonstrate them prior to the end of the session.  Teodora demonstrated good  understanding of the education provided. See Electronic Medical Record under Patient Instructions for exercises provided during therapy sessions    Assessment     Pt presents to clinic with indep amb without AD today but with c/o pain over surg sites and decreased MFM of infrapatellar fat pads. Resumed MPT today with pt noting good improvements in pain modulation afterwards as well as during quad sets and amb. Focus on resuming and normalizing knee ext ROM today with good tolerance overall.    Teodora Is progressing well towards her goals.   Patient prognosis is Good.     Patient will continue to benefit from skilled outpatient physical therapy to address the deficits listed in the problem list box on initial evaluation, provide pt/family education and to maximize pt's level of " independence in the home and community environment.     Patient's spiritual, cultural and educational needs considered and pt agreeable to plan of care and goals.     Anticipated barriers to physical therapy: transportation    Goals:   Short Terms Goals: 4 weeks     Goal  Progress  Date    (1)  Patient will be I with HOME EXERCISE PROGRAM  Initial, Not Met     (2)  Patient will  demonstrate full active extension of R knee, with 5 deg of hyperextension passively to improve gait  Initial, Not Met      (3)   Patient will ambulate on level surface in clinic >200 ft without antalgic deviation to improve functional mobility  Initial, Not Met         Long Term Goals: 8 weeks     Goal  Progress  Date    (1)  Patient will demonstrate R knee A/PROM 0/+5 to 135/140 to improve tolerance to transfers and static positioning.  Initial, Not Met      (2)   Patient will report pain at worst to R knee 2/10 to improve functional mobility in community.   Initial, Not Met     (3)   Patient will  demonstrate strength to B LE grossly 4+/5 with MMT to improve to stair navigation.   Initial, Not Met         Plan     Cont with mobility, strength, ROM for eventual return to work    Nancy Wilkins, PT

## 2024-12-19 ENCOUNTER — OFFICE VISIT (OUTPATIENT)
Dept: SPORTS MEDICINE | Facility: CLINIC | Age: 64
End: 2024-12-19
Payer: COMMERCIAL

## 2024-12-19 ENCOUNTER — PATIENT MESSAGE (OUTPATIENT)
Dept: SPORTS MEDICINE | Facility: CLINIC | Age: 64
End: 2024-12-19
Payer: COMMERCIAL

## 2024-12-19 VITALS
SYSTOLIC BLOOD PRESSURE: 141 MMHG | BODY MASS INDEX: 34.23 KG/M2 | HEART RATE: 97 BPM | WEIGHT: 205.69 LBS | DIASTOLIC BLOOD PRESSURE: 84 MMHG

## 2024-12-19 DIAGNOSIS — Z87.828 S/P ARTHROSCOPIC PARTIAL MEDIAL MENISCECTOMY OF RIGHT KNEE: Primary | ICD-10-CM

## 2024-12-19 DIAGNOSIS — Z98.890 S/P ARTHROSCOPIC PARTIAL MEDIAL MENISCECTOMY OF RIGHT KNEE: Primary | ICD-10-CM

## 2024-12-19 PROCEDURE — 99999 PR PBB SHADOW E&M-EST. PATIENT-LVL III: CPT | Mod: PBBFAC,,, | Performed by: ORTHOPAEDIC SURGERY

## 2024-12-19 NOTE — LETTER
Patient: Teodora Yuen   YOB: 1960   Clinic Number: [id number]   Today's Date: December 19, 2024        Certificate to Return to Work       Teodora Yuen  was seen by Rob Baker PA-C on 12/19/2024.      Specific restrictions: Patient is cleared to return to work with no restrictions.     If you have any questions or concerns, please feel free to contact the office at [phone].    Thank you.

## 2024-12-19 NOTE — PROGRESS NOTES
CC: Left knee scope post op 2 weeks    Patient is here s/p below for 2 week post-op appointment. She reports 2/10 pain and is taking oxycodone on occasion for pain. She denies any nausea, vomiting, fever, chills, shortness of breath, or calf pain. She is attending formal physical therapy at [facility].     DATE OF PROCEDURE:  12/4/2024      PREOPERATIVE DIAGNOSES:   1. Right knee chondromalacia  2. Right knee medial meniscus tear.   3. Right knee lateral meniscus tear     POSTOPERATIVE DIAGNOSES:   1. Right knee chondromalacia  2. Right knee medial meniscus tear.   3. Right knee lateral meniscus tear     PROCEDURES:   1. Right knee arthroscopic chondroplasty  2. Right knee arthroscopic partial medial and lateral meniscectomies     SURGEON: Joel Glover M.D.      Assisting Surgeon:   MD Frandy Morales     PE:    BP (!) 141/84   Pulse 97   Wt 93.3 kg (205 lb 11 oz)   BMI 34.23 kg/m²      Left knee:    Incision clean/dry/intact  No sign of infection  Mild swelling  Compartments soft  Neurovascular status intact in extremity    FROM  Decreased quad strength  Minimal to no effusion  ROM: 0-130    Assessment:  2 weeks s/p left knee scope    Plan:  1.  Removed steri strips.  Wounds healing well    2.  Arthroscopic pictures reviewed and rehab plans discussed.    3.  Physical therapy for quad, ROM, modalities.  HEP for ROM, knee, VMO and hip abductor strengthening.     4.  Pain level acceptable for first post op visit.  Wean off pain medicine by next visit if still taking    5. Return to clinic in 4 weeks at 6 weeks post-op with Joel Glover MD.

## 2024-12-24 ENCOUNTER — CLINICAL SUPPORT (OUTPATIENT)
Dept: REHABILITATION | Facility: OTHER | Age: 64
End: 2024-12-24
Payer: COMMERCIAL

## 2024-12-24 DIAGNOSIS — Z98.890 POST-OPERATIVE STATE: Primary | ICD-10-CM

## 2024-12-24 DIAGNOSIS — M25.561 ACUTE POSTOPERATIVE PAIN OF RIGHT KNEE: ICD-10-CM

## 2024-12-24 DIAGNOSIS — R26.9 GAIT ABNORMALITY: ICD-10-CM

## 2024-12-24 DIAGNOSIS — G89.18 ACUTE POSTOPERATIVE PAIN OF RIGHT KNEE: ICD-10-CM

## 2024-12-24 PROCEDURE — 97530 THERAPEUTIC ACTIVITIES: CPT | Mod: PN | Performed by: PHYSICAL THERAPIST

## 2024-12-24 PROCEDURE — 97112 NEUROMUSCULAR REEDUCATION: CPT | Mod: PN | Performed by: PHYSICAL THERAPIST

## 2024-12-24 NOTE — PROGRESS NOTES
[facility]   Physical Therapy Treatment Note      Name: Teodora Yuen  Clinic Number: [id number]    Therapy Diagnosis:   Encounter Diagnoses   Name Primary?    Post-operative state Yes    Acute postoperative pain of right knee     Gait abnormality      Physician: Joel Glover MD    Visit Date: 12/24/2024    Physician Orders: PT Eval and Treat   Medical Diagnosis from Referral: S83.231A (ICD-10-CM) - Complex tear of medial meniscus of right knee, unspecified whether old or current tear, initial encounter M94.261 (ICD-10-CM) - Chondromalacia, right knee S83.271A (ICD-10-CM) - Complex tear of lateral meniscus of right knee as current injury, initial encounter  Evaluation Date: 12/11/2024  Date of Surgery: 12/4/2024  Authorization Period Expiration: 11/22/2025  Plan of Care Expiration: 2/7/2025  Visit # / Visits authorized: 4/ 12     Foto  Date  Score    #1/3 12/11/2024 48%   #2/3       #3/3          Time In: 0915  Time Out: 1015  Total Appointment Time (timed & untimed codes): 60 minutes     Precautions: Standard    PTA Visit #: 0/5       Subjective     Patient reports: still having a little soreness around incisions. She's been to the office a few days and just needed to make sure she got up often during the day. Off for the holidays now.   She was compliant with home exercise program.  Response to previous treatment: good  Functional change: walking without AD    Pain: 2/10  Location: R knee    Objective      Objective Measures updated at progress report unless specified.     12/18/24:  Amb: I w/o AD, mild decrease in TKE during stance phase  Palpation: TTP at medial>lateral infrapat fat pad with decreased MFM  Joint mobility: mild hypo superior patellar glide    Treatment     Teodora received the bolded treatments listed below:      therapeutic exercises to develop strength, endurance, ROM, flexibility, posture, and core stabilization for 00 minutes including:  None today    manual  "therapy techniques: Joint mobilizations, Manual traction, Myofacial release, and Soft tissue Mobilization were applied to the: R for 10 minutes, including:  Patellar glides all directions x3x30" GrI-III, inf fat pad STM/MFR    neuromuscular re-education activities to improve: Balance, Coordination, Kinesthetic, Sense, Proprioception, and Posture for 30 minutes. The following activities were included:  Recumbent bike: 8 minutes (circulation, ROM, jt lubrication; collected hx/cc)  Heel prop x 5 min  Quad set + heel lift x 30 x 5" holds  R  SAQ x 30 x 5" holds  SLR x3x10  SL hip abd x3x10  +Bridges 3x10  Incline board calf stretch x3x30"    therapeutic activities to improve functional performance for 30 minutes, including:  Sit to stands + resisted TKE x3x 10 x RTB (PT assisted)  +Hesitation march 2 x 30ft  +Suitcase carry with uni 10# kb 4 x 30 ft  +Ankle flip 2 x 30ft  +Box squat on 24" box 2 x 10      Patient Education and Home Exercises       Education provided:   - none today    Written Home Exercises Provided: Pt instructed to continue prior HEP. Exercises were reviewed and Teodora was able to demonstrate them prior to the end of the session.  Teodora demonstrated good  understanding of the education provided. See Electronic Medical Record under Patient Instructions for exercises provided during therapy sessions    Assessment     Good tolerance to treatment today. Progression of functional strengthening with good training effect noted. Initial discomfort with box squats 2* anterior weight shift, improves with cuing for technique.     Teodora Is progressing well towards her goals.   Patient prognosis is Good.     Patient will continue to benefit from skilled outpatient physical therapy to address the deficits listed in the problem list box on initial evaluation, provide pt/family education and to maximize pt's level of independence in the home and community environment.     Patient's spiritual, cultural and educational " needs considered and pt agreeable to plan of care and goals.     Anticipated barriers to physical therapy: transportation    Goals:   Short Terms Goals: 4 weeks     Goal  Progress  Date    (1)  Patient will be I with HOME EXERCISE PROGRAM  Initial, Not Met 12/24/2024     (2)  Patient will  demonstrate full active extension of R knee, with 5 deg of hyperextension passively to improve gait  Initial, Not Met  12/24/2024     (3)   Patient will ambulate on level surface in clinic >200 ft without antalgic deviation to improve functional mobility  Initial, Not Met  12/24/2024        Long Term Goals: 8 weeks     Goal  Progress  Date    (1)  Patient will demonstrate R knee A/PROM 0/+5 to 135/140 to improve tolerance to transfers and static positioning.  Initial, Not Met  12/24/2024     (2)   Patient will report pain at worst to R knee 2/10 to improve functional mobility in community.   Initial, Not Met 12/24/2024     (3)   Patient will  demonstrate strength to B LE grossly 4+/5 with MMT to improve to stair navigation.   Initial, Not Met   12/24/2024       Plan     Cont with mobility, strength, ROM for eventual return to work    Yi Mcdonald, PT, DPT

## 2024-12-31 ENCOUNTER — CLINICAL SUPPORT (OUTPATIENT)
Dept: REHABILITATION | Facility: OTHER | Age: 64
End: 2024-12-31
Payer: COMMERCIAL

## 2024-12-31 DIAGNOSIS — R26.9 GAIT ABNORMALITY: ICD-10-CM

## 2024-12-31 DIAGNOSIS — Z98.890 POST-OPERATIVE STATE: Primary | ICD-10-CM

## 2024-12-31 DIAGNOSIS — M25.561 ACUTE POSTOPERATIVE PAIN OF RIGHT KNEE: ICD-10-CM

## 2024-12-31 DIAGNOSIS — G89.18 ACUTE POSTOPERATIVE PAIN OF RIGHT KNEE: ICD-10-CM

## 2024-12-31 PROCEDURE — 97530 THERAPEUTIC ACTIVITIES: CPT | Mod: PN

## 2024-12-31 PROCEDURE — 97140 MANUAL THERAPY 1/> REGIONS: CPT | Mod: PN

## 2024-12-31 PROCEDURE — 97112 NEUROMUSCULAR REEDUCATION: CPT | Mod: PN

## 2024-12-31 NOTE — PROGRESS NOTES
[facility]   Physical Therapy Treatment Note      Name: Teodora Yuen  Clinic Number: [id number]    Therapy Diagnosis:   Encounter Diagnoses   Name Primary?    Post-operative state Yes    Acute postoperative pain of right knee     Gait abnormality      Physician: Joel Glover MD    Visit Date: 12/31/2024    Physician Orders: PT Eval and Treat   Medical Diagnosis from Referral: S83.231A (ICD-10-CM) - Complex tear of medial meniscus of right knee, unspecified whether old or current tear, initial encounter M94.261 (ICD-10-CM) - Chondromalacia, right knee S83.271A (ICD-10-CM) - Complex tear of lateral meniscus of right knee as current injury, initial encounter  Evaluation Date: 12/11/2024  Date of Surgery: 12/4/2024  Authorization Period Expiration: 11/22/2025  Plan of Care Expiration: 2/7/2025  Visit # / Visits authorized: 4/ 12     Foto  Date  Score    #1/3 12/11/2024 48%   #2/3       #3/3          Time In: 1115  Time Out: 1220  Total Appointment Time (timed & untimed codes): 65 minutes     Precautions: Standard    PTA Visit #: 0/5       Subjective     Patient reports: still having a little soreness around incisions. She's been to the office a few days and just needed to make sure she got up often during the day. Off for the holidays now.   She was compliant with home exercise program.  Response to previous treatment: good  Functional change: walking without AD    Pain: 2/10  Location: R knee    Objective      Objective Measures updated at progress report unless specified.     12/18/24:  Amb: I w/o AD, mild decrease in TKE during stance phase  Palpation: TTP at medial>lateral infrapat fat pad with decreased MFM  Joint mobility: mild hypo superior patellar glide    12/31/2024  R Knee ext A/PROM: -3/0  Palpation: decreased distal quad MFM      Treatment     Teodora received the bolded treatments listed below:      therapeutic exercises to develop strength, endurance, ROM, flexibility,  "posture, and core stabilization for 00 minutes including:  None today    manual therapy techniques: Joint mobilizations, Manual traction, Myofacial release, and Soft tissue Mobilization were applied to the: R for 10 minutes, including:  Patellar glides all directions x3x30" GrI-III, inf fat pad STM/MFR  Kinesiotape for MCL stability (1 x I strip) and patellar lift (1 x Y strip). Patient received education regarding appropriate care and removal of Kinesiotape. Patient instructed in proper removal techniques if skin irritation occurs.    neuromuscular re-education activities to improve: Balance, Coordination, Kinesthetic, Sense, Proprioception, and Posture for 40 minutes. The following activities were included:  Recumbent bike: 8 minutes (circulation, ROM, jt lubrication; collected hx/cc)  Heel prop x 3' w/ 3# AW, x3' w/ 6# AW  +heel prop + quad set x 5 min x 10" holds - strap used to promote TKE  Quad set + heel lift x 30 x 5" holds  R  SAQ x 30 x 5" holds  +prone hang x 5'  +LAQ x 30 x 5" holds x 5# AW  SLR x3x10  SL hip abd x3x10  Bridges 3x10  Incline board calf stretch x3x30"  +prone quad stretch x 2 x 30" -- poor tolerance with freq HS cramping, change to supine nv    therapeutic activities to improve functional performance for 15 minutes, including:  Sit to stands + resisted TKE x3x 10 x RTB (PT assisted)  Hesitation march + suitcase carry 2 x 30ft x 10# KB  Ankle flip 2 x 30ft  Box squat on 24" box 1 x 10 -- attempted but UA 2* pain      Patient Education and Home Exercises       Education provided:   - none today    Written Home Exercises Provided: Pt instructed to continue prior HEP. Exercises were reviewed and Teodora was able to demonstrate them prior to the end of the session.  Teodora demonstrated good  understanding of the education provided. See Electronic Medical Record under Patient Instructions for exercises provided during therapy sessions    Assessment     Pt presents to clinic with increased pain and " soreness today. Modified program today with heavy emphasis around normalizing extension > flexion ROM and sx reduction. Good tolerance with supine heel prop but with moderate hesitancy, HS guarding, and decreased ext ROM with prone hangs. Poor tolerance with attempted sit to stands with c/o suprapatellar pain; attempted to stretch distal quads but with poor tolerance and freq HS cramping.    Teodora Is progressing well towards her goals.   Patient prognosis is Good.     Patient will continue to benefit from skilled outpatient physical therapy to address the deficits listed in the problem list box on initial evaluation, provide pt/family education and to maximize pt's level of independence in the home and community environment.     Patient's spiritual, cultural and educational needs considered and pt agreeable to plan of care and goals.     Anticipated barriers to physical therapy: transportation    Goals:   Short Terms Goals: 4 weeks     Goal  Progress  Date    (1)  Patient will be I with HOME EXERCISE PROGRAM  Initial, Not Met 12/31/2024     (2)  Patient will  demonstrate full active extension of R knee, with 5 deg of hyperextension passively to improve gait  Initial, Not Met  12/31/2024     (3)   Patient will ambulate on level surface in clinic >200 ft without antalgic deviation to improve functional mobility  Initial, Not Met  12/31/2024        Long Term Goals: 8 weeks     Goal  Progress  Date    (1)  Patient will demonstrate R knee A/PROM 0/+5 to 135/140 to improve tolerance to transfers and static positioning.  Initial, Not Met  12/31/2024     (2)   Patient will report pain at worst to R knee 2/10 to improve functional mobility in community.   Initial, Not Met 12/31/2024     (3)   Patient will  demonstrate strength to B LE grossly 4+/5 with MMT to improve to stair navigation.   Initial, Not Met   12/31/2024       Plan     Cont with mobility, strength, ROM for eventual return to work    Nancy Wilkins, PT

## 2025-01-07 ENCOUNTER — PATIENT MESSAGE (OUTPATIENT)
Dept: REHABILITATION | Facility: OTHER | Age: 65
End: 2025-01-07
Payer: COMMERCIAL

## 2025-01-09 ENCOUNTER — CLINICAL SUPPORT (OUTPATIENT)
Dept: REHABILITATION | Facility: OTHER | Age: 65
End: 2025-01-09
Payer: COMMERCIAL

## 2025-01-09 ENCOUNTER — PATIENT MESSAGE (OUTPATIENT)
Dept: SPORTS MEDICINE | Facility: CLINIC | Age: 65
End: 2025-01-09
Payer: COMMERCIAL

## 2025-01-09 DIAGNOSIS — R26.9 GAIT ABNORMALITY: ICD-10-CM

## 2025-01-09 DIAGNOSIS — G89.18 ACUTE POSTOPERATIVE PAIN OF RIGHT KNEE: ICD-10-CM

## 2025-01-09 DIAGNOSIS — Z98.890 POST-OPERATIVE STATE: Primary | ICD-10-CM

## 2025-01-09 DIAGNOSIS — M25.561 ACUTE POSTOPERATIVE PAIN OF RIGHT KNEE: ICD-10-CM

## 2025-01-09 PROCEDURE — 97140 MANUAL THERAPY 1/> REGIONS: CPT | Mod: PN | Performed by: PHYSICAL THERAPIST

## 2025-01-09 PROCEDURE — 97112 NEUROMUSCULAR REEDUCATION: CPT | Mod: PN | Performed by: PHYSICAL THERAPIST

## 2025-01-09 PROCEDURE — 97530 THERAPEUTIC ACTIVITIES: CPT | Mod: PN | Performed by: PHYSICAL THERAPIST

## 2025-01-09 NOTE — PROGRESS NOTES
[facility]   Physical Therapy Treatment Note      Name: Teodora Yuen  Clinic Number: [id number]    Therapy Diagnosis:   Encounter Diagnoses   Name Primary?    Post-operative state Yes    Acute postoperative pain of right knee     Gait abnormality      Physician: Joel Glover MD    Visit Date: 1/9/2025    Physician Orders: PT Eval and Treat   Medical Diagnosis from Referral: S83.231A (ICD-10-CM) - Complex tear of medial meniscus of right knee, unspecified whether old or current tear, initial encounter M94.261 (ICD-10-CM) - Chondromalacia, right knee S83.271A (ICD-10-CM) - Complex tear of lateral meniscus of right knee as current injury, initial encounter  Evaluation Date: 12/11/2024  Date of Surgery: 12/4/2024  Authorization Period Expiration: 11/22/2025  Plan of Care Expiration: 2/7/2025  Visit # / Visits authorized: 5/ 12     Foto  Date  Score    #1/3 12/11/2024 48%   #2/3  1/9/2025  64%   #3/3          Time In: 1700  Time Out: 1805  Total Appointment Time (timed & untimed codes): 65 minutes     Precautions: Standard    PTA Visit #: 0/5       Subjective     Patient reports: she's been back at work since last week, initially was wearing a low heel but she was having a marked increase in swelling after being on her feet more than usual with work and helping her mother. She switched back to tennis shoes and has been doing everything she can to get the swelling down - it's better than it was, but still swollen.   She was compliant with home exercise program.  Response to previous treatment: good  Functional change: walking without AD    Pain: 3/10  Location: R knee    Objective      Objective Measures updated at progress report unless specified.     12/18/24:  Amb: I w/o AD, mild decrease in TKE during stance phase  Palpation: TTP at medial>lateral infrapat fat pad with decreased MFM  Joint mobility: mild hypo superior patellar glide    12/31/2024  R Knee ext A/PROM: -3/0  Palpation:  "decreased distal quad MFM      1/9/2024  Range of Motion:   Knee Left active Left Passive Right Active R passive   Flexion 135 140 130 134   Extension 0 +5 -3 0        Lower Extremity Strength  Right LE   Left LE     Ankle dorsiflexion: 5/5 Ankle dorsiflexion: 5/5   Ankle plantarflexion: 4/5 Ankle plantarflexion: 5/5   Knee extension: 4+/5 Knee extension: 5/5   Knee flexion: 4+/5 Knee flexion: 5/5   Hip flexion: 4+/5 Hip flexion: 5/5   Hip external rotation 4+/5 Hip external rotation 5/5   Hip internal rotation 4-/5 (limited by pain) Hip internal rotation 5/5   Hip abduction:  4+/5 Hip abduction: 5/5   Hip adduction: 4+/5 Hip adduction: 5/5   Hip extension: 4/5 Hip extension 4/5             CMS Impairment/Limitation/Restriction for FOTO Knee Survey    Therapist reviewed FOTO scores for Teoodra Yuen on 1/9/2025.   FOTO documents entered into [employer] - see Media section.    Evaluation: 48%    Current : 64%    Goal: 60%           Treatment     Teodora received the bolded treatments listed below:      therapeutic exercises to develop strength, endurance, ROM, flexibility, posture, and core stabilization for 00 minutes including:  None today    manual therapy techniques: Joint mobilizations, Manual traction, Myofacial release, and Soft tissue Mobilization were applied to the: R for 10 minutes, including:  Patellar glides all directions x3x30" GrI-III, inf fat pad STM/MFR  Kinesiotape for MCL stability (1 x I strip) and patellar lift (1 x Y strip). Patient received education regarding appropriate care and removal of Kinesiotape. Patient instructed in proper removal techniques if skin irritation occurs.    neuromuscular re-education activities to improve: Balance, Coordination, Kinesthetic, Sense, Proprioception, and Posture for 40 minutes. The following activities were included:  Recumbent bike: 8 minutes (circulation, ROM, jt lubrication; collected hx/cc)  Heel prop x 3' w/ 3# AW, x3' w/ 6# AW  heel prop + quad set x 5 min x " "10" holds - strap used to promote TKE  Quad set + heel lift x 30 x 5" holds  R  SAQ x 30 x 5" holds  prone hang x 5' with 3# AW  +Prone quad set 5" x 20    LAQ x 30 x 5" holds x 5# AW  SLR x3x10  SL hip abd x3x10  Bridges 3x10  Incline board calf stretch x3x30"  +Supine hip flexor/quad stretch with strap 3 x 30" R    therapeutic activities to improve functional performance for 15 minutes, including:  Assessment for month progress note  Sit to stands + resisted TKE x3x 10 x RTB (PT assisted)  Hesitation march + suitcase carry 2 x 30ft x 10# KB  Ankle flip 2 x 30ft  Box squat on 24" box 1 x 10 -- attempted but UA 2* pain      Patient Education and Home Exercises       Education provided:   - none today    Written Home Exercises Provided: Pt instructed to continue prior HEP. Exercises were reviewed and Teodora was able to demonstrate them prior to the end of the session.  Teodora demonstrated good  understanding of the education provided. See Electronic Medical Record under Patient Instructions for exercises provided during therapy sessions    Assessment     Teodora demonstrates excellent progress with R knee flexion and strength with MMT. She continues with limitation to end range extension. Increased focus on passive and active extension both in clinic and with HEP.     Teodora Is progressing well towards her goals.   Patient prognosis is Good.     Patient will continue to benefit from skilled outpatient physical therapy to address the deficits listed in the problem list box on initial evaluation, provide pt/family education and to maximize pt's level of independence in the home and community environment.     Patient's spiritual, cultural and educational needs considered and pt agreeable to plan of care and goals.     Anticipated barriers to physical therapy: transportation    Goals:   Short Terms Goals: 4 weeks     Goal  Progress  Date    (1)  Patient will be I with HOME EXERCISE PROGRAM  Met 1/9/2025     (2)  Patient will  " demonstrate full active extension of R knee, with 5 deg of hyperextension passively to improve gait  Initial, Not Met  1/9/2025     (3)   Patient will ambulate on level surface in clinic >200 ft without antalgic deviation to improve functional mobility  Met  1/9/2025        Long Term Goals: 8 weeks     Goal  Progress  Date    (1)  Patient will demonstrate R knee A/PROM 0/+5 to 135/140 to improve tolerance to transfers and static positioning.  Initial, Not Met  1/9/2025     (2)   Patient will report pain at worst to R knee 2/10 to improve functional mobility in community.   Initial, Not Met 1/9/2025     (3)   Patient will  demonstrate strength to B LE grossly 4+/5 with MMT to improve to stair navigation.   Initial, Not Met   1/9/2025       Plan     Cont with mobility, strength, ROM for eventual return to work    Yi Mcdonald, PT, DPT

## 2025-01-13 DIAGNOSIS — E78.5 HYPERLIPIDEMIA, UNSPECIFIED HYPERLIPIDEMIA TYPE: ICD-10-CM

## 2025-01-13 RX ORDER — PRAVASTATIN SODIUM 40 MG/1
40 TABLET ORAL DAILY
Qty: 30 TABLET | Refills: 0 | Status: SHIPPED | OUTPATIENT
Start: 2025-01-13

## 2025-01-13 NOTE — TELEPHONE ENCOUNTER
No care due was identified.  [facility] Embedded Care Due Messages. Reference number: [id number].   1/13/2025 9:03:09 AM CST

## 2025-01-16 ENCOUNTER — PATIENT MESSAGE (OUTPATIENT)
Dept: REHABILITATION | Facility: OTHER | Age: 65
End: 2025-01-16
Payer: COMMERCIAL

## 2025-01-16 ENCOUNTER — PATIENT MESSAGE (OUTPATIENT)
Dept: SPORTS MEDICINE | Facility: CLINIC | Age: 65
End: 2025-01-16
Payer: COMMERCIAL

## 2025-01-16 ENCOUNTER — OFFICE VISIT (OUTPATIENT)
Dept: SPORTS MEDICINE | Facility: CLINIC | Age: 65
End: 2025-01-16
Payer: COMMERCIAL

## 2025-01-16 VITALS
WEIGHT: 209.69 LBS | SYSTOLIC BLOOD PRESSURE: 150 MMHG | DIASTOLIC BLOOD PRESSURE: 81 MMHG | BODY MASS INDEX: 34.93 KG/M2 | HEIGHT: 65 IN | HEART RATE: 91 BPM

## 2025-01-16 DIAGNOSIS — Z98.890 S/P ARTHROSCOPIC PARTIAL MEDIAL MENISCECTOMY OF RIGHT KNEE: Primary | ICD-10-CM

## 2025-01-16 DIAGNOSIS — M25.461 EFFUSION, RIGHT KNEE: ICD-10-CM

## 2025-01-16 DIAGNOSIS — M17.11 PRIMARY OSTEOARTHRITIS OF RIGHT KNEE: ICD-10-CM

## 2025-01-16 DIAGNOSIS — Z87.828 S/P ARTHROSCOPIC PARTIAL MEDIAL MENISCECTOMY OF RIGHT KNEE: Primary | ICD-10-CM

## 2025-01-16 PROCEDURE — 20610 DRAIN/INJ JOINT/BURSA W/O US: CPT | Mod: 79,RT,S$GLB, | Performed by: ORTHOPAEDIC SURGERY

## 2025-01-16 PROCEDURE — 99024 POSTOP FOLLOW-UP VISIT: CPT | Mod: S$GLB,,, | Performed by: ORTHOPAEDIC SURGERY

## 2025-01-16 PROCEDURE — 99999 PR PBB SHADOW E&M-EST. PATIENT-LVL III: CPT | Mod: PBBFAC,,, | Performed by: ORTHOPAEDIC SURGERY

## 2025-01-16 RX ORDER — TRIAMCINOLONE ACETONIDE 40 MG/ML
40 INJECTION, SUSPENSION INTRA-ARTICULAR; INTRAMUSCULAR
Status: DISCONTINUED | OUTPATIENT
Start: 2025-01-16 | End: 2025-01-16 | Stop reason: HOSPADM

## 2025-01-16 RX ADMIN — TRIAMCINOLONE ACETONIDE 40 MG: 40 INJECTION, SUSPENSION INTRA-ARTICULAR; INTRAMUSCULAR at 09:01

## 2025-01-16 NOTE — PROGRESS NOTES
"CC: Left knee scope post op 6 weeks    Patient is here s/p below for 6 week post-op appointment. She is attending PT at [facility] and progressing well. She notes some swelling post op that comes and goes. She has returned to work full duty and notes swelling in lower leg with activity.     DATE OF PROCEDURE:  12/4/2024      PREOPERATIVE DIAGNOSES:   1. Right knee chondromalacia  2. Right knee medial meniscus tear.   3. Right knee lateral meniscus tear     POSTOPERATIVE DIAGNOSES:   1. Right knee chondromalacia  2. Right knee medial meniscus tear.   3. Right knee lateral meniscus tear     PROCEDURES:   1. Right knee arthroscopic chondroplasty  2. Right knee arthroscopic partial medial and lateral meniscectomies     SURGEON: Joel Glover M.D.      Assisting Surgeon:   MD Frandy Morales [facility]     PE:    BP (!) 150/81   Pulse 91   Ht 5' 5" (1.651 m)   Wt 95.1 kg (209 lb 10.5 oz)   BMI 34.89 kg/m²      Left knee:    Incision clean/dry/intact  No sign of infection  Mild swelling  Compartments soft  Neurovascular status intact in extremity    FROM  Decreased quad strength  Minimal to no effusion  ROM: 0-130    Assessment:  6 weeks s/p left knee scope    Plan:  1.  Aspiration 20 cc and CSI     2. Continue PT    3. Medial  brace     4. F/u in 6 weeks.         " yes

## 2025-01-16 NOTE — PROCEDURES
Large Joint Aspiration/Injection: R knee    Date/Time: 1/16/2025 9:30 AM    Performed by: Joel Glover MD  Authorized by: Joel Glover MD    Consent Done?:  Yes (Verbal)  Indications:  Pain  Site marked: the procedure site was marked    Timeout: prior to procedure the correct patient, procedure, and site was verified    Prep: patient was prepped and draped in usual sterile fashion      Details:  Needle Size:  22 G  Ultrasonic Guidance for needle placement?: No    Approach:  Superior  Location:  Knee  Site:  R knee  Medications:  40 mg triamcinolone acetonide 40 mg/mL  Aspirate amount (mL):  20  Aspirate:  Serous  Patient tolerance:  Patient tolerated the procedure well with no immediate complications

## 2025-01-17 ENCOUNTER — CLINICAL SUPPORT (OUTPATIENT)
Dept: REHABILITATION | Facility: OTHER | Age: 65
End: 2025-01-17
Attending: PHYSICAL THERAPIST
Payer: COMMERCIAL

## 2025-01-17 DIAGNOSIS — Z98.890 POST-OPERATIVE STATE: Primary | ICD-10-CM

## 2025-01-17 DIAGNOSIS — G89.18 ACUTE POSTOPERATIVE PAIN OF RIGHT KNEE: ICD-10-CM

## 2025-01-17 DIAGNOSIS — R26.9 GAIT ABNORMALITY: ICD-10-CM

## 2025-01-17 DIAGNOSIS — M25.561 ACUTE POSTOPERATIVE PAIN OF RIGHT KNEE: ICD-10-CM

## 2025-01-17 PROCEDURE — 97112 NEUROMUSCULAR REEDUCATION: CPT | Mod: PN

## 2025-01-17 PROCEDURE — 97140 MANUAL THERAPY 1/> REGIONS: CPT | Mod: PN

## 2025-01-17 PROCEDURE — 97530 THERAPEUTIC ACTIVITIES: CPT | Mod: PN
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
## 2025-02-06 ENCOUNTER — CLINICAL SUPPORT (OUTPATIENT)
Dept: REHABILITATION | Facility: OTHER | Age: 65
End: 2025-02-06
Attending: PHYSICAL THERAPIST
Payer: COMMERCIAL

## 2025-02-06 DIAGNOSIS — R26.9 GAIT ABNORMALITY: ICD-10-CM

## 2025-02-06 DIAGNOSIS — M25.561 ACUTE POSTOPERATIVE PAIN OF RIGHT KNEE: ICD-10-CM

## 2025-02-06 DIAGNOSIS — Z98.890 POST-OPERATIVE STATE: Primary | ICD-10-CM

## 2025-02-06 DIAGNOSIS — G89.18 ACUTE POSTOPERATIVE PAIN OF RIGHT KNEE: ICD-10-CM

## 2025-02-06 PROCEDURE — 97530 THERAPEUTIC ACTIVITIES: CPT | Mod: PN | Performed by: PHYSICAL THERAPIST
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
## 2025-02-07 ENCOUNTER — PATIENT MESSAGE (OUTPATIENT)
Dept: SPORTS MEDICINE | Facility: CLINIC | Age: 65
End: 2025-02-07
Payer: COMMERCIAL
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
## 2025-02-12 DIAGNOSIS — D50.9 IRON DEFICIENCY ANEMIA, UNSPECIFIED IRON DEFICIENCY ANEMIA TYPE: ICD-10-CM

## 2025-02-12 DIAGNOSIS — K21.9 GASTROESOPHAGEAL REFLUX DISEASE, UNSPECIFIED WHETHER ESOPHAGITIS PRESENT: ICD-10-CM

## 2025-02-12 DIAGNOSIS — Z79.899 ENCOUNTER FOR MONITORING LONG-TERM PROTON PUMP INHIBITOR THERAPY: ICD-10-CM

## 2025-02-12 DIAGNOSIS — Z13.220 ENCOUNTER FOR LIPID SCREENING FOR CARDIOVASCULAR DISEASE: ICD-10-CM

## 2025-02-12 DIAGNOSIS — E78.5 HYPERLIPIDEMIA, UNSPECIFIED HYPERLIPIDEMIA TYPE: ICD-10-CM

## 2025-02-12 DIAGNOSIS — E11.9 NEW ONSET TYPE 2 DIABETES MELLITUS: ICD-10-CM

## 2025-02-12 DIAGNOSIS — Z13.6 ENCOUNTER FOR LIPID SCREENING FOR CARDIOVASCULAR DISEASE: ICD-10-CM

## 2025-02-12 DIAGNOSIS — E04.2 MULTINODULAR GOITER (NONTOXIC): ICD-10-CM

## 2025-02-12 DIAGNOSIS — Z00.00 ANNUAL PHYSICAL EXAM: Primary | ICD-10-CM

## 2025-02-12 DIAGNOSIS — Z51.81 ENCOUNTER FOR MONITORING LONG-TERM PROTON PUMP INHIBITOR THERAPY: ICD-10-CM

## 2025-02-12 RX ORDER — PRAVASTATIN SODIUM 40 MG/1
40 TABLET ORAL DAILY
Qty: 30 TABLET | Refills: 0 | Status: SHIPPED | OUTPATIENT
Start: 2025-02-12

## 2025-02-12 NOTE — TELEPHONE ENCOUNTER
Care Due:                  Date            Visit Type   Department     Provider  --------------------------------------------------------------------------------                                EP -                              PRIMARY      [facility] Primary  Last Visit: 12-      CARE ([facility])   Care           Tere Brown                              EP -                              PRIMARY      [facility] Primary  Next Visit: 04-      CARE ([facility])   Care           Tere Brown                                                            Last  Test          Frequency    Reason                     Performed    Due Date  --------------------------------------------------------------------------------    Lipid Panel.  12 months..  pravastatin..............  12-   12-    Health [facility] Embedded Care Due Messages. Reference number: [id number].   2/12/2025 10:26:29 AM CST

## 2025-02-13 ENCOUNTER — CLINICAL SUPPORT (OUTPATIENT)
Dept: REHABILITATION | Facility: OTHER | Age: 65
End: 2025-02-13
Payer: COMMERCIAL

## 2025-02-13 DIAGNOSIS — Z98.890 POST-OPERATIVE STATE: Primary | ICD-10-CM

## 2025-02-13 DIAGNOSIS — M25.561 ACUTE POSTOPERATIVE PAIN OF RIGHT KNEE: ICD-10-CM

## 2025-02-13 DIAGNOSIS — R26.9 GAIT ABNORMALITY: ICD-10-CM

## 2025-02-13 DIAGNOSIS — G89.18 ACUTE POSTOPERATIVE PAIN OF RIGHT KNEE: ICD-10-CM

## 2025-02-13 PROCEDURE — 97110 THERAPEUTIC EXERCISES: CPT | Mod: PN | Performed by: PHYSICAL THERAPIST

## 2025-02-13 PROCEDURE — 97530 THERAPEUTIC ACTIVITIES: CPT | Mod: PN | Performed by: PHYSICAL THERAPIST

## 2025-02-13 PROCEDURE — 97112 NEUROMUSCULAR REEDUCATION: CPT | Mod: PN | Performed by: PHYSICAL THERAPIST

## 2025-02-13 NOTE — PROGRESS NOTES
[facility] OUTPATIENT THERAPY AND WELLNESS   Physical Therapy Treatment Note      Name: Teodora Yuen  Clinic Number: [id number]    Therapy Diagnosis:   Encounter Diagnoses   Name Primary?    Post-operative state Yes    Acute postoperative pain of right knee     Gait abnormality        Physician: Joel Glover MD    Visit Date: 2/13/2025    Physician Orders: PT Eval and Treat   Medical Diagnosis from Referral: S83.231A (ICD-10-CM) - Complex tear of medial meniscus of right knee, unspecified whether old or current tear, initial encounter M94.261 (ICD-10-CM) - Chondromalacia, right knee S83.271A (ICD-10-CM) - Complex tear of lateral meniscus of right knee as current injury, initial encounter  Evaluation Date: 12/11/2024  Date of Surgery: 12/4/2024  Authorization Period Expiration: 11/22/2025  Plan of Care Expiration: 3/20/2025  Visit # / Visits authorized: 9/ 12     Foto  Date  Score    #1/3 12/11/2024 48%   #2/3  1/9/2025  64%   #3/3  2/6/2025  70%      Time In: 1715  Time Out: 1810  Total Appointment Time (timed & untimed codes): 55 minutes     Precautions: Standard    PTA Visit #: 0/5       Subjective     Patient reports: doing pretty good, has a little soreness here and there, but not as bad. She got her brace and it's not comfortable to wear all the time, but she's going to wear it for parades or if she's standing up for a long time at events.      She was compliant with home exercise program.  Response to previous treatment: good  Functional change: able to wear low heels     Pain: 2/10  Location: R knee    Objective      Objective Measures updated at progress report unless specified.     2/6/2025     Range of Motion:   Knee Left active Left Passive Right Active R passive   Flexion 135 140 135 140   Extension 0 +5 0 +2               Lower Extremity Strength  Right LE   Left LE     Ankle dorsiflexion: 5/5 Ankle dorsiflexion: 5/5   Ankle plantarflexion: 4/5 Ankle plantarflexion: 5/5   Knee extension: 5/5 Knee  "extension: 5/5   Knee flexion: 4+/5 Knee flexion: 5/5   Hip flexion: 5/5 Hip flexion: 5/5   Hip external rotation 5/5 Hip external rotation 5/5   Hip internal rotation 4+/5 Hip internal rotation 5/5   Hip abduction:  5/5 Hip abduction: 5/5   Hip adduction: 5/5 Hip adduction: 5/5   Hip extension: 4/5 Hip extension 4/5        Microfet testing  Quad (60 deg flexion)  R: max 52 lbs  L: max 48 lbs    Hamstring  R: max 37.3 lbs  L: max 43 lbs        CMS Impairment/Limitation/Restriction for FOTO Knee Survey    Therapist reviewed FOTO scores for Teodora Yuen on 2/6/2025.   FOTO documents entered into [employer] - see Media section.    Evaluation: 48%    Current : 70%    Goal: 60%           Treatment     Teodora received the bolded treatments listed below:      therapeutic exercises to develop strength, endurance, ROM, flexibility, posture, and core stabilization for 15 minutes including:  Recumbent bike: 8 minutes (circulation, ROM, jt lubrication; collected hx/cc)  +B HR 30x  Incline board calf stretch x3x30"    manual therapy techniques: Joint mobilizations, Manual traction, Myofacial release, and Soft tissue Mobilization were applied to the: R for 0 minutes, including:  Patellar glides all directions x3x30" GrI-III, tibiofemoral AP/PA glides x3x30", inf fat pad STM/MFR  Kinesiotape for MCL stability (1 x I strip) and patellar lift (1 x Y strip). Patient received education regarding appropriate care and removal of Kinesiotape. Patient instructed in proper removal techniques if skin irritation occurs.    neuromuscular re-education activities to improve: Balance, Coordination, Kinesthetic, Sense, Proprioception, and Posture for 10 minutes. The following activities were included:    heel prop + quad set x 5 min x 10" holds - strap used to promote TKE  SAQ (1/2 foam under knee) x 5 min x 10" holds - strap used to promote TKE  SLR with 2# cw, x2x10 and 1 x 5 R  +SLR + hip ER x 2x10        LAQ x 30 x 5" holds x 5# AW  SL hip abd with " "2# cw x3x10  Bridges 3x10    Supine hip flexor/quad stretch with strap 3 x 30" R    therapeutic activities to improve functional performance for 30 minutes, including:    +Step ups on 6" step 2 x 10    +Shuttle DL x 2 black cords x 2 x 10  +Shuttle SL x 1 black cord x 2 x 10 R    +Matrix leg extension (eccentric R) x 15# x 2 x 10  +Matrix leg curl (eccentric R) x 15# x 2 x 10    +Occitan squat with red power band, 2 x 10  Box squat (asymm w/ 2" step under L foot) on 18" box 2 x 10  +standing TKEx x2x10 x 5" holds - red power band      Patient Education and Home Exercises       Education provided:   - none today    Written Home Exercises Provided: Pt instructed to continue prior HEP. Exercises were reviewed and Teodora was able to demonstrate them prior to the end of the session.  Teodora demonstrated good  understanding of the education provided. See Electronic Medical Record under Patient Instructions for exercises provided during therapy sessions    Assessment     Good tolerance to progression of functional strengthening today. Fatigue reported with addition of weight to SLR in flexion, but able to complete. Pt continues with slight limitation to R knee hyperextension compared to L.     Teodora Is progressing well towards her goals.   Patient prognosis is Good.     Patient will continue to benefit from skilled outpatient physical therapy to address the deficits listed in the problem list box on initial evaluation, provide pt/family education and to maximize pt's level of independence in the home and community environment.     Patient's spiritual, cultural and educational needs considered and pt agreeable to plan of care and goals.     Anticipated barriers to physical therapy: transportation    Goals:   Short Terms Goals: 4 weeks     Goal  Progress  Date    (1)  Patient will be I with HOME EXERCISE PROGRAM  Met 2/13/2025     (2)  Patient will  demonstrate full active extension of R knee, with 5 deg of hyperextension " passively to improve gait  progressing, Not Met  2/13/2025     (3)   Patient will ambulate on level surface in clinic >200 ft without antalgic deviation to improve functional mobility  Met  2/13/2025        Long Term Goals: 8 weeks     Goal  Progress  Date    (1)  Patient will demonstrate R knee A/PROM 0/+5 to 135/140 to improve tolerance to transfers and static positioning.  progressing, Not Met  2/13/2025     (2)   Patient will report pain at worst to R knee 2/10 to improve functional mobility in community.  Met 2/13/2025     (3)   Patient will  demonstrate strength to B LE grossly 4+/5 with MMT to improve to stair navigation.   Met   2/13/2025       Plan     Cont with mobility, strength, ROM for eventual return to work    Yi Mcdonald, PT, DPT

## 2025-02-26 DIAGNOSIS — Z12.31 OTHER SCREENING MAMMOGRAM: ICD-10-CM

## 2025-02-27 ENCOUNTER — OFFICE VISIT (OUTPATIENT)
Dept: SPORTS MEDICINE | Facility: CLINIC | Age: 65
End: 2025-02-27
Payer: COMMERCIAL

## 2025-02-27 ENCOUNTER — PATIENT MESSAGE (OUTPATIENT)
Dept: SPORTS MEDICINE | Facility: CLINIC | Age: 65
End: 2025-02-27
Payer: COMMERCIAL

## 2025-02-27 VITALS
DIASTOLIC BLOOD PRESSURE: 87 MMHG | HEART RATE: 85 BPM | WEIGHT: 210.19 LBS | SYSTOLIC BLOOD PRESSURE: 138 MMHG | BODY MASS INDEX: 34.98 KG/M2

## 2025-02-27 DIAGNOSIS — M17.11 PRIMARY OSTEOARTHRITIS OF RIGHT KNEE: ICD-10-CM

## 2025-02-27 DIAGNOSIS — Z98.890 S/P ARTHROSCOPIC PARTIAL MEDIAL MENISCECTOMY OF RIGHT KNEE: Primary | ICD-10-CM

## 2025-02-27 DIAGNOSIS — Z87.828 S/P ARTHROSCOPIC PARTIAL MEDIAL MENISCECTOMY OF RIGHT KNEE: Primary | ICD-10-CM

## 2025-02-27 PROCEDURE — 99999 PR PBB SHADOW E&M-EST. PATIENT-LVL II: CPT | Mod: PBBFAC,,, | Performed by: ORTHOPAEDIC SURGERY

## 2025-02-27 RX ORDER — IBUPROFEN 800 MG/1
800 TABLET ORAL 3 TIMES DAILY
Qty: 60 TABLET | Refills: 1 | Status: SHIPPED | OUTPATIENT
Start: 2025-02-27 | End: 2025-02-28 | Stop reason: ALTCHOICE

## 2025-02-27 RX ORDER — TRIAMCINOLONE ACETONIDE 40 MG/ML
40 INJECTION, SUSPENSION INTRA-ARTICULAR; INTRAMUSCULAR
Status: DISCONTINUED | OUTPATIENT
Start: 2025-02-27 | End: 2025-02-27 | Stop reason: HOSPADM

## 2025-02-27 RX ADMIN — TRIAMCINOLONE ACETONIDE 40 MG: 40 INJECTION, SUSPENSION INTRA-ARTICULAR; INTRAMUSCULAR at 09:02

## 2025-02-27 NOTE — PROGRESS NOTES
CC: Left knee scope post op 12 weeks    Patient is here s/p below for 12 week post-op appointment. She is attending PT at [facility] and progressing well. Patient has transitioned to HEP. She has three more visits with PT scheduled and will finish them after mardi gras because of traffic and parade routes.   Patient received a CSI and aspiration last visit and notes moderate relief from this. She had a  brace measured but reports she does not feel it helps and the fit feels off. She is doing a knee sleeve with moderate relief.     DATE OF PROCEDURE:  12/4/2024      PREOPERATIVE DIAGNOSES:   1. Right knee chondromalacia  2. Right knee medial meniscus tear.   3. Right knee lateral meniscus tear     POSTOPERATIVE DIAGNOSES:   1. Right knee chondromalacia  2. Right knee medial meniscus tear.   3. Right knee lateral meniscus tear     PROCEDURES:   1. Right knee arthroscopic chondroplasty  2. Right knee arthroscopic partial medial and lateral meniscectomies     SURGEON: Joel Glover M.D.      Assisting Surgeon:   MD Frandy Morales     PE:    /87   Pulse 85   Wt 95.4 kg (210 lb 3.3 oz)   BMI 34.98 kg/m²      Left knee:    Incision clean/dry/intact  No sign of infection  Mild swelling  Compartments soft  Neurovascular status intact in extremity    FROM  Decreased quad strength  Minimal to no effusion  ROM: 0-130    Assessment:  12 weeks s/p left knee scope    Plan:  1.  Finish PT - transition to HEP    2. Ibu 800    3. CSI left knee

## 2025-02-27 NOTE — PROCEDURES
Large Joint Aspiration/Injection: L knee    Date/Time: 2/27/2025 9:30 AM    Performed by: Joel Glover MD  Authorized by: Joel Glover MD    Consent Done?:  Yes (Verbal)  Indications:  Pain  Site marked: the procedure site was marked    Timeout: prior to procedure the correct patient, procedure, and site was verified    Prep: patient was prepped and draped in usual sterile fashion      Details:  Needle Size:  22 G  Ultrasonic Guidance for needle placement?: No    Approach:  Superior  Location:  Knee  Site:  L knee  Medications:  40 mg triamcinolone acetonide 40 mg/mL  Patient tolerance:  Patient tolerated the procedure well with no immediate complications

## 2025-02-28 ENCOUNTER — PATIENT MESSAGE (OUTPATIENT)
Dept: SPORTS MEDICINE | Facility: CLINIC | Age: 65
End: 2025-02-28
Payer: COMMERCIAL

## 2025-02-28 DIAGNOSIS — G89.29 CHRONIC PAIN OF RIGHT KNEE: ICD-10-CM

## 2025-02-28 DIAGNOSIS — M25.561 CHRONIC PAIN OF RIGHT KNEE: ICD-10-CM

## 2025-02-28 DIAGNOSIS — M17.11 PRIMARY OSTEOARTHRITIS OF RIGHT KNEE: Primary | ICD-10-CM

## 2025-02-28 RX ORDER — CELECOXIB 200 MG/1
200 CAPSULE ORAL 2 TIMES DAILY
Qty: 60 CAPSULE | Refills: 1 | Status: SHIPPED | OUTPATIENT
Start: 2025-02-28

## 2025-03-03 DIAGNOSIS — K21.9 GASTROESOPHAGEAL REFLUX DISEASE WITHOUT ESOPHAGITIS: ICD-10-CM

## 2025-03-03 RX ORDER — ESOMEPRAZOLE MAGNESIUM 40 MG/1
40 CAPSULE, DELAYED RELEASE ORAL
Qty: 30 CAPSULE | Refills: 0 | Status: SHIPPED | OUTPATIENT
Start: 2025-03-03

## 2025-03-03 NOTE — TELEPHONE ENCOUNTER
No care due was identified.  Health [facility] Embedded Care Due Messages. Reference number: [id number].   3/03/2025 3:00:51 PM CST

## 2025-03-06 ENCOUNTER — PATIENT OUTREACH (OUTPATIENT)
Dept: ADMINISTRATIVE | Facility: HOSPITAL | Age: 65
End: 2025-03-06
Payer: COMMERCIAL

## 2025-03-11 ENCOUNTER — TELEPHONE (OUTPATIENT)
Dept: SPORTS MEDICINE | Facility: CLINIC | Age: 65
End: 2025-03-11
Payer: COMMERCIAL

## 2025-03-11 NOTE — TELEPHONE ENCOUNTER
Called and spoke with the patient regarding concerns of increased left knee pain.  No new injuries or trauma to the left knee since her last visit.  Patient states that she was on her feet a lot during Mardi [place] doing lots of walking/standing and may have done too much lead to increased pain in her knee.  Recommend continue conservative treatment with Celebrex 200 mg twice daily in alternation with extra-strength Tylenol.  Continue rice therapy at home.  Patient will reach out to us in the next few days if symptoms have worsened or fail to improve.

## 2025-03-14 DIAGNOSIS — E78.5 HYPERLIPIDEMIA, UNSPECIFIED HYPERLIPIDEMIA TYPE: ICD-10-CM

## 2025-03-14 RX ORDER — PRAVASTATIN SODIUM 40 MG/1
40 TABLET ORAL DAILY
Qty: 30 TABLET | Refills: 5 | Status: SHIPPED | OUTPATIENT
Start: 2025-03-14

## 2025-03-14 NOTE — TELEPHONE ENCOUNTER
No care due was identified.  Health [facility] Embedded Care Due Messages. Reference number: [id number].   3/14/2025 10:26:46 AM CDT

## 2025-03-15 ENCOUNTER — PATIENT MESSAGE (OUTPATIENT)
Dept: SPORTS MEDICINE | Facility: CLINIC | Age: 65
End: 2025-03-15
Payer: COMMERCIAL

## 2025-03-15 DIAGNOSIS — M17.11 PRIMARY OSTEOARTHRITIS OF RIGHT KNEE: Primary | ICD-10-CM

## 2025-03-17 RX ORDER — MELOXICAM 15 MG/1
15 TABLET ORAL DAILY
Qty: 30 TABLET | Refills: 3 | Status: SHIPPED | OUTPATIENT
Start: 2025-03-17

## 2025-03-31 ENCOUNTER — RESULTS FOLLOW-UP (OUTPATIENT)
Dept: PRIMARY CARE CLINIC | Facility: CLINIC | Age: 65
End: 2025-03-31
Payer: COMMERCIAL

## 2025-03-31 ENCOUNTER — PATIENT MESSAGE (OUTPATIENT)
Dept: PRIMARY CARE CLINIC | Facility: CLINIC | Age: 65
End: 2025-03-31
Payer: COMMERCIAL

## 2025-03-31 ENCOUNTER — LAB VISIT (OUTPATIENT)
Dept: LAB | Facility: HOSPITAL | Age: 65
End: 2025-03-31
Attending: FAMILY MEDICINE
Payer: COMMERCIAL

## 2025-03-31 DIAGNOSIS — K21.9 GASTROESOPHAGEAL REFLUX DISEASE, UNSPECIFIED WHETHER ESOPHAGITIS PRESENT: ICD-10-CM

## 2025-03-31 DIAGNOSIS — K21.9 GASTROESOPHAGEAL REFLUX DISEASE WITHOUT ESOPHAGITIS: ICD-10-CM

## 2025-03-31 DIAGNOSIS — E78.5 HYPERLIPIDEMIA, UNSPECIFIED HYPERLIPIDEMIA TYPE: ICD-10-CM

## 2025-03-31 DIAGNOSIS — Z79.899 ENCOUNTER FOR MONITORING LONG-TERM PROTON PUMP INHIBITOR THERAPY: ICD-10-CM

## 2025-03-31 DIAGNOSIS — Z51.81 ENCOUNTER FOR MONITORING LONG-TERM PROTON PUMP INHIBITOR THERAPY: ICD-10-CM

## 2025-03-31 DIAGNOSIS — Z00.00 ANNUAL PHYSICAL EXAM: ICD-10-CM

## 2025-03-31 DIAGNOSIS — E11.9 NEW ONSET TYPE 2 DIABETES MELLITUS: ICD-10-CM

## 2025-03-31 DIAGNOSIS — E04.2 MULTINODULAR GOITER (NONTOXIC): ICD-10-CM

## 2025-03-31 DIAGNOSIS — D50.9 IRON DEFICIENCY ANEMIA, UNSPECIFIED IRON DEFICIENCY ANEMIA TYPE: ICD-10-CM

## 2025-03-31 LAB
25(OH)D3+25(OH)D2 SERPL-MCNC: 9 NG/ML (ref 30–96)
ABSOLUTE EOSINOPHIL (OHS): 0.17 K/UL
ABSOLUTE MONOCYTE (OHS): 0.51 K/UL (ref 0.3–1)
ABSOLUTE NEUTROPHIL COUNT (OHS): 4.6 K/UL (ref 1.8–7.7)
ALBUMIN SERPL BCP-MCNC: 3.4 G/DL (ref 3.5–5.2)
ALP SERPL-CCNC: 101 UNIT/L (ref 40–150)
ALT SERPL W/O P-5'-P-CCNC: 22 UNIT/L (ref 10–44)
ANION GAP (OHS): 9 MMOL/L (ref 8–16)
AST SERPL-CCNC: 16 UNIT/L (ref 11–45)
BASOPHILS # BLD AUTO: 0.08 K/UL
BASOPHILS NFR BLD AUTO: 1 %
BILIRUB SERPL-MCNC: 0.4 MG/DL (ref 0.1–1)
BUN SERPL-MCNC: 17 MG/DL (ref 8–23)
CALCIUM SERPL-MCNC: 9 MG/DL (ref 8.7–10.5)
CHLORIDE SERPL-SCNC: 108 MMOL/L (ref 95–110)
CHOLEST SERPL-MCNC: 199 MG/DL (ref 120–199)
CHOLEST/HDLC SERPL: 4.1 {RATIO} (ref 2–5)
CO2 SERPL-SCNC: 24 MMOL/L (ref 23–29)
CREAT SERPL-MCNC: 0.7 MG/DL (ref 0.5–1.4)
EAG (OHS): 143 MG/DL (ref 68–131)
ERYTHROCYTE [DISTWIDTH] IN BLOOD BY AUTOMATED COUNT: 14.5 % (ref 11.5–14.5)
FERRITIN SERPL-MCNC: 14 NG/ML (ref 20–300)
GFR SERPLBLD CREATININE-BSD FMLA CKD-EPI: >60 ML/MIN/1.73/M2
GLUCOSE SERPL-MCNC: 121 MG/DL (ref 70–110)
HBA1C MFR BLD: 6.6 % (ref 4–5.6)
HCT VFR BLD AUTO: 41.2 % (ref 37–48.5)
HDLC SERPL-MCNC: 48 MG/DL (ref 40–75)
HDLC SERPL: 24.1 % (ref 20–50)
HGB BLD-MCNC: 13 GM/DL (ref 12–16)
IMM GRANULOCYTES # BLD AUTO: 0.09 K/UL (ref 0–0.04)
IMM GRANULOCYTES NFR BLD AUTO: 1.2 % (ref 0–0.5)
IRON SATN MFR SERPL: 24 % (ref 20–50)
IRON SERPL-MCNC: 116 UG/DL (ref 30–160)
LDLC SERPL CALC-MCNC: 107.6 MG/DL (ref 63–159)
LYMPHOCYTES # BLD AUTO: 2.32 K/UL (ref 1–4.8)
MAGNESIUM SERPL-MCNC: 2 MG/DL (ref 1.6–2.6)
MCH RBC QN AUTO: 26.6 PG (ref 27–50)
MCHC RBC AUTO-ENTMCNC: 31.6 G/DL (ref 32–36)
MCV RBC AUTO: 84 FL (ref 82–98)
NONHDLC SERPL-MCNC: 151 MG/DL
NUCLEATED RBC (/100WBC) (OHS): 0 /100 WBC
PLATELET # BLD AUTO: 280 K/UL (ref 150–450)
PMV BLD AUTO: 10.6 FL (ref 9.2–12.9)
POTASSIUM SERPL-SCNC: 4 MMOL/L (ref 3.5–5.1)
PROT SERPL-MCNC: 7.4 GM/DL (ref 6–8.4)
RBC # BLD AUTO: 4.89 M/UL (ref 4–5.4)
RELATIVE EOSINOPHIL (OHS): 2.2 %
RELATIVE LYMPHOCYTE (OHS): 29.9 % (ref 18–48)
RELATIVE MONOCYTE (OHS): 6.6 % (ref 4–15)
RELATIVE NEUTROPHIL (OHS): 59.1 % (ref 38–73)
SODIUM SERPL-SCNC: 141 MMOL/L (ref 136–145)
TIBC SERPL-MCNC: 474 UG/DL (ref 250–450)
TRANSFERRIN SERPL-MCNC: 320 MG/DL (ref 200–375)
TRIGL SERPL-MCNC: 217 MG/DL (ref 30–150)
TSH SERPL-ACNC: 2.77 UIU/ML (ref 0.4–4)
VIT B12 SERPL-MCNC: 376 PG/ML (ref 210–950)
WBC # BLD AUTO: 7.77 K/UL (ref 3.9–12.7)

## 2025-03-31 PROCEDURE — 83735 ASSAY OF MAGNESIUM: CPT

## 2025-03-31 PROCEDURE — 82040 ASSAY OF SERUM ALBUMIN: CPT

## 2025-03-31 PROCEDURE — 83036 HEMOGLOBIN GLYCOSYLATED A1C: CPT

## 2025-03-31 PROCEDURE — 84443 ASSAY THYROID STIM HORMONE: CPT

## 2025-03-31 PROCEDURE — 82607 VITAMIN B-12: CPT

## 2025-03-31 PROCEDURE — 36415 COLL VENOUS BLD VENIPUNCTURE: CPT

## 2025-03-31 PROCEDURE — 85025 COMPLETE CBC W/AUTO DIFF WBC: CPT

## 2025-03-31 PROCEDURE — 82306 VITAMIN D 25 HYDROXY: CPT

## 2025-03-31 PROCEDURE — 80061 LIPID PANEL: CPT

## 2025-03-31 PROCEDURE — 83540 ASSAY OF IRON: CPT

## 2025-03-31 PROCEDURE — 82728 ASSAY OF FERRITIN: CPT

## 2025-03-31 RX ORDER — ESOMEPRAZOLE MAGNESIUM 40 MG/1
40 CAPSULE, DELAYED RELEASE ORAL
Qty: 90 CAPSULE | Refills: 0 | Status: SHIPPED | OUTPATIENT
Start: 2025-03-31 | End: 2025-04-03 | Stop reason: SDUPTHER

## 2025-03-31 NOTE — TELEPHONE ENCOUNTER
Pt requesting a refill on Nexium and would like a 90 days supply    Pt also commenting on 3/31 lab results    Has upcoming office visit on 4/3

## 2025-03-31 NOTE — TELEPHONE ENCOUNTER
No care due was identified.  [facility] Embedded Care Due Messages. Reference number: [id number].   3/31/2025 11:27:33 AM CDT

## 2025-04-01 DIAGNOSIS — D50.9 IRON DEFICIENCY ANEMIA, UNSPECIFIED IRON DEFICIENCY ANEMIA TYPE: ICD-10-CM

## 2025-04-01 RX ORDER — FERROUS GLUCONATE 324(38)MG
324 TABLET ORAL
Qty: 90 TABLET | Refills: 4 | Status: SHIPPED | OUTPATIENT
Start: 2025-04-01

## 2025-04-03 ENCOUNTER — OFFICE VISIT (OUTPATIENT)
Dept: PRIMARY CARE CLINIC | Facility: CLINIC | Age: 65
End: 2025-04-03
Payer: COMMERCIAL

## 2025-04-03 VITALS
DIASTOLIC BLOOD PRESSURE: 80 MMHG | SYSTOLIC BLOOD PRESSURE: 136 MMHG | BODY MASS INDEX: 34.46 KG/M2 | WEIGHT: 206.81 LBS | HEART RATE: 83 BPM | HEIGHT: 65 IN | OXYGEN SATURATION: 98 %

## 2025-04-03 DIAGNOSIS — K21.9 GASTROESOPHAGEAL REFLUX DISEASE WITHOUT ESOPHAGITIS: ICD-10-CM

## 2025-04-03 DIAGNOSIS — Z12.11 SCREEN FOR COLON CANCER: ICD-10-CM

## 2025-04-03 DIAGNOSIS — E66.811 CLASS 1 OBESITY WITH SERIOUS COMORBIDITY AND BODY MASS INDEX (BMI) OF 34.0 TO 34.9 IN ADULT, UNSPECIFIED OBESITY TYPE: ICD-10-CM

## 2025-04-03 DIAGNOSIS — K21.9 GASTROESOPHAGEAL REFLUX DISEASE, UNSPECIFIED WHETHER ESOPHAGITIS PRESENT: ICD-10-CM

## 2025-04-03 DIAGNOSIS — D50.9 IRON DEFICIENCY ANEMIA, UNSPECIFIED IRON DEFICIENCY ANEMIA TYPE: ICD-10-CM

## 2025-04-03 DIAGNOSIS — E78.5 HYPERLIPIDEMIA, UNSPECIFIED HYPERLIPIDEMIA TYPE: ICD-10-CM

## 2025-04-03 DIAGNOSIS — E11.9 TYPE 2 DIABETES MELLITUS WITHOUT COMPLICATION, WITHOUT LONG-TERM CURRENT USE OF INSULIN: ICD-10-CM

## 2025-04-03 DIAGNOSIS — Z00.00 ANNUAL PHYSICAL EXAM: Primary | ICD-10-CM

## 2025-04-03 DIAGNOSIS — E04.2 MULTINODULAR GOITER (NONTOXIC): ICD-10-CM

## 2025-04-03 PROCEDURE — 1160F RVW MEDS BY RX/DR IN RCRD: CPT | Mod: CPTII,S$GLB,, | Performed by: FAMILY MEDICINE

## 2025-04-03 PROCEDURE — 3075F SYST BP GE 130 - 139MM HG: CPT | Mod: CPTII,S$GLB,, | Performed by: FAMILY MEDICINE

## 2025-04-03 PROCEDURE — 99999 PR PBB SHADOW E&M-EST. PATIENT-LVL IV: CPT | Mod: PBBFAC,,, | Performed by: FAMILY MEDICINE

## 2025-04-03 PROCEDURE — 1159F MED LIST DOCD IN RCRD: CPT | Mod: CPTII,S$GLB,, | Performed by: FAMILY MEDICINE

## 2025-04-03 PROCEDURE — 99396 PREV VISIT EST AGE 40-64: CPT | Mod: S$GLB,,, | Performed by: FAMILY MEDICINE

## 2025-04-03 PROCEDURE — 3079F DIAST BP 80-89 MM HG: CPT | Mod: CPTII,S$GLB,, | Performed by: FAMILY MEDICINE

## 2025-04-03 PROCEDURE — 3044F HG A1C LEVEL LT 7.0%: CPT | Mod: CPTII,S$GLB,, | Performed by: FAMILY MEDICINE

## 2025-04-03 PROCEDURE — 3008F BODY MASS INDEX DOCD: CPT | Mod: CPTII,S$GLB,, | Performed by: FAMILY MEDICINE

## 2025-04-03 RX ORDER — ESOMEPRAZOLE MAGNESIUM 40 MG/1
40 CAPSULE, DELAYED RELEASE ORAL
Qty: 90 CAPSULE | Refills: 1 | Status: SHIPPED | OUTPATIENT
Start: 2025-04-03

## 2025-04-03 RX ORDER — ROSUVASTATIN CALCIUM 10 MG/1
10 TABLET, COATED ORAL DAILY
Qty: 90 TABLET | Refills: 3 | Status: SHIPPED | OUTPATIENT
Start: 2025-04-03 | End: 2026-04-03

## 2025-04-03 RX ORDER — TIRZEPATIDE 2.5 MG/.5ML
2.5 INJECTION, SOLUTION SUBCUTANEOUS
Qty: 4 PEN | Refills: 3 | Status: SHIPPED | OUTPATIENT
Start: 2025-04-03

## 2025-04-03 NOTE — PROGRESS NOTES
"Subjective:       Patient ID: Teodora Yuen is a 65 y.o. female.    Chief Complaint: Annual Exam (Pt here for annual exam/ lab result f/u)    HPI  66 y/o female with Vit D def, hx of Osteopenia, hlpd, r adrenal mass, multiple thyroid nodules s/p L thyroid lobectomy, hx of diverticulitis, hiatal hernia, low ferritin/iron, hx of gastric ulcer, gerd, family hx of ovarian ca in mother, Class 1 obesity BMI 34, DM2 is here for annual exam.     She has had increased stress as her mom has been in and out of the hospital, she is dealing with R knee meniscal tear, she is taking Mobic daily for pain which has helped. She denies f/n/v/d/constipation/cp/sob/urinary sx. She has started back walking over the past month. She is trying to eat more healthy. Sleeping poorly, she takes hydroxyzine prn but it makes her sleep and she cannot hear if her mom needs something.         R wrist fracture: following with ortho, completed OT  Gerd: Dr. Zelaya did EGD 2 years, she has a hiatal hernia, has gastric ulcer in the past,trial of nexium 20 mg was done and she developed breakthrough, nexium 40 mg daily  Vitamin D def: on supplement  Low ferritin/iron: on supplement  Multiple thyroid nodules: s/p L sided lobectomy 11/2021, following with endo  DM2/Class 1 obesity BMI 34: off metformin as it was "not helpful", no personal or family hx of pancreatitis or medullary thyroid cancer, A1c 6.6 3/2025  HLPD: pravstatin 40 mg daily, will switch to crestor 10  GYN: no recent pelvic but stacy with bso in 1999 secondary to family hx of ovarian cancer and endometriosis, mmg overdue  Eye exam utd at [facility]  Dental utd  R sided adrenal mass: was seen by Urology, CT abd/pelvic 6/2022 stable  Osteopenia: was on Actonel for 6 months in the past, repeat dexa due  Colonoscopy done in 2015 or 2016 Dr. Zelaya; hx of Diverticulitis, cologuard negative 1/2022    Labs 3/2025 reviewed    The 10-year ASCVD risk score (Prachi CONNOR, et al., 2019) is: 10.2%    Values used " "to calculate the score:      Age: 65 years      Sex: Female      Is Non- : No      Diabetic: Yes      Tobacco smoker: No      Systolic Blood Pressure: 136 mmHg      Is BP treated: No      HDL Cholesterol: 48 mg/dL      Total Cholesterol: 199 mg/dL       Review of Systems    Objective:      /80 (BP Location: Left arm, Patient Position: Sitting)   Pulse 83   Ht 5' 5" (1.651 m)   Wt 93.8 kg (206 lb 12.7 oz)   SpO2 98%   BMI 34.41 kg/m²   Physical Exam  Vitals and nursing note reviewed.   Constitutional:       Appearance: She is well-developed.   HENT:      Head: Normocephalic and atraumatic.      Right Ear: Tympanic membrane normal.      Left Ear: Tympanic membrane normal.      Mouth/Throat:      Pharynx: No oropharyngeal exudate or posterior oropharyngeal erythema.   Neck:      Thyroid: No thyromegaly.   Cardiovascular:      Rate and Rhythm: Normal rate and regular rhythm.      Heart sounds: Normal heart sounds.   Pulmonary:      Effort: Pulmonary effort is normal. No respiratory distress.      Breath sounds: Normal breath sounds.   Abdominal:      General: Bowel sounds are normal. There is no distension.      Palpations: Abdomen is soft. There is no mass.      Tenderness: There is no abdominal tenderness.   Musculoskeletal:      Cervical back: Normal range of motion and neck supple.      Right lower leg: No edema.      Left lower leg: No edema.   Lymphadenopathy:      Cervical: No cervical adenopathy.   Skin:     General: Skin is warm and dry.   Neurological:      Mental Status: She is alert.         Assessment:       1. Annual physical exam    2. Class 1 obesity with serious comorbidity and body mass index (BMI) of 34.0 to 34.9 in adult, unspecified obesity type    3. Type 2 diabetes mellitus without complication, without long-term current use of insulin    4. Multinodular goiter (nontoxic)    5. Iron deficiency anemia, unspecified iron deficiency anemia type    6. Hyperlipidemia, " unspecified hyperlipidemia type    7. Gastroesophageal reflux disease, unspecified whether esophagitis present    8. Gastroesophageal reflux disease without esophagitis    9. Screen for colon cancer        Plan:   Teodora was seen today for annual exam.    Diagnoses and all orders for this visit:    Annual physical exam    Class 1 obesity with serious comorbidity and body mass index (BMI) of 34.0 to 34.9 in adult, unspecified obesity type  -     rosuvastatin (CRESTOR) 10 MG tablet; Take 1 tablet (10 mg total) by mouth once daily.  -     tirzepatide (MOUNJARO) 2.5 mg/0.5 mL PnIj; Inject 2.5 mg into the skin every 7 days.    Type 2 diabetes mellitus without complication, without long-term current use of insulin  -     rosuvastatin (CRESTOR) 10 MG tablet; Take 1 tablet (10 mg total) by mouth once daily.  -     tirzepatide (MOUNJARO) 2.5 mg/0.5 mL PnIj; Inject 2.5 mg into the skin every 7 days.  -     Hemoglobin A1C; Future  -     Microalbumin/Creatinine Ratio, Urine; Future    Multinodular goiter (nontoxic)    Iron deficiency anemia, unspecified iron deficiency anemia type    Hyperlipidemia, unspecified hyperlipidemia type  -     rosuvastatin (CRESTOR) 10 MG tablet; Take 1 tablet (10 mg total) by mouth once daily.  -     tirzepatide (MOUNJARO) 2.5 mg/0.5 mL PnIj; Inject 2.5 mg into the skin every 7 days.    Gastroesophageal reflux disease, unspecified whether esophagitis present    Gastroesophageal reflux disease without esophagitis  -     esomeprazole (NEXIUM) 40 MG capsule; Take 1 capsule (40 mg total) by mouth daily before breakfast.    Screen for colon cancer  -     Cologuard Screening (Multitarget Stool DNA); Future  -     Cologuard Screening (Multitarget Stool DNA)

## 2025-04-07 ENCOUNTER — PATIENT MESSAGE (OUTPATIENT)
Dept: PRIMARY CARE CLINIC | Facility: CLINIC | Age: 65
End: 2025-04-07
Payer: COMMERCIAL

## 2025-04-09 DIAGNOSIS — R09.89 THROAT CLEARING: ICD-10-CM

## 2025-04-09 DIAGNOSIS — R09.82 PND (POST-NASAL DRIP): ICD-10-CM

## 2025-04-09 RX ORDER — FLUTICASONE PROPIONATE 50 MCG
1 SPRAY, SUSPENSION (ML) NASAL DAILY
Qty: 32 G | Refills: 3 | Status: SHIPPED | OUTPATIENT
Start: 2025-04-09

## 2025-04-09 NOTE — TELEPHONE ENCOUNTER
No care due was identified.  Health [facility] Embedded Care Due Messages. Reference number: [id number].   4/09/2025 10:07:20 AM CDT

## 2025-04-10 ENCOUNTER — OFFICE VISIT (OUTPATIENT)
Dept: SPORTS MEDICINE | Facility: CLINIC | Age: 65
End: 2025-04-10
Payer: COMMERCIAL

## 2025-04-10 VITALS
DIASTOLIC BLOOD PRESSURE: 84 MMHG | HEIGHT: 65 IN | WEIGHT: 207 LBS | HEART RATE: 82 BPM | SYSTOLIC BLOOD PRESSURE: 124 MMHG | BODY MASS INDEX: 34.49 KG/M2

## 2025-04-10 DIAGNOSIS — Z98.890 S/P ARTHROSCOPIC PARTIAL MEDIAL MENISCECTOMY OF RIGHT KNEE: Primary | ICD-10-CM

## 2025-04-10 DIAGNOSIS — Z87.828 S/P ARTHROSCOPIC PARTIAL MEDIAL MENISCECTOMY OF RIGHT KNEE: Primary | ICD-10-CM

## 2025-04-10 DIAGNOSIS — Z02.6 ENCOUNTER RELATED TO WORKER'S COMPENSATION CLAIM: ICD-10-CM

## 2025-04-10 PROCEDURE — 99999 PR PBB SHADOW E&M-EST. PATIENT-LVL III: CPT | Mod: PBBFAC,,, | Performed by: ORTHOPAEDIC SURGERY

## 2025-04-10 NOTE — PROGRESS NOTES
"  CC: Left knee scope post op 3.5 months    Patient is here s/p below for 3.5 month post-op appointment. She has completed her formal PT and has transitioned to HEP 3x a week. She is still taking Mobic 1x a day and is asking when she should wean off of it. Patient is at a 0/10 pain today.    DATE OF PROCEDURE:  12/4/2024      PREOPERATIVE DIAGNOSES:   1. Right knee chondromalacia  2. Right knee medial meniscus tear.   3. Right knee lateral meniscus tear     POSTOPERATIVE DIAGNOSES:   1. Right knee chondromalacia  2. Right knee medial meniscus tear.   3. Right knee lateral meniscus tear     PROCEDURES:   1. Right knee arthroscopic chondroplasty  2. Right knee arthroscopic partial medial and lateral meniscectomies     SURGEON: Joel Glover M.D.      Assisting Surgeon:   MD Frandy Morales     PE:    /84   Pulse 82   Ht 5' 5" (1.651 m)   Wt 93.9 kg (207 lb 0.2 oz)   BMI 34.45 kg/m²      Right knee:    Incision clean/dry/intact  No sign of infection  Mild swelling  Compartments soft  Neurovascular status intact in extremity    FROM  Decreased quad strength  Minimal to no effusion  ROM: 0-130    Assessment:  3.5 month s/p left knee scope    Plan:  1.  Continue HEP    2. Mobic as needed    3. Follow up PRN           "

## 2025-04-11 ENCOUNTER — PATIENT MESSAGE (OUTPATIENT)
Dept: SPORTS MEDICINE | Facility: CLINIC | Age: 65
End: 2025-04-11
Payer: COMMERCIAL

## 2025-04-11 DIAGNOSIS — M17.11 PRIMARY OSTEOARTHRITIS OF RIGHT KNEE: Primary | ICD-10-CM

## 2025-04-23 ENCOUNTER — RESULTS FOLLOW-UP (OUTPATIENT)
Dept: PRIMARY CARE CLINIC | Facility: CLINIC | Age: 65
End: 2025-04-23

## 2025-04-23 ENCOUNTER — PATIENT MESSAGE (OUTPATIENT)
Dept: PRIMARY CARE CLINIC | Facility: CLINIC | Age: 65
End: 2025-04-23
Payer: COMMERCIAL

## 2025-04-23 DIAGNOSIS — E11.9 TYPE 2 DIABETES MELLITUS WITHOUT COMPLICATION, WITHOUT LONG-TERM CURRENT USE OF INSULIN: ICD-10-CM

## 2025-04-23 DIAGNOSIS — E66.811 CLASS 1 OBESITY WITH SERIOUS COMORBIDITY AND BODY MASS INDEX (BMI) OF 34.0 TO 34.9 IN ADULT, UNSPECIFIED OBESITY TYPE: Primary | ICD-10-CM

## 2025-04-23 NOTE — TELEPHONE ENCOUNTER
Patient requesting refill and increase on Mounjaro 2.5 mg  Pt's  LOV with Tere Brown MD , 4/3/2025  Medication pending with increase in dosage

## 2025-04-23 NOTE — TELEPHONE ENCOUNTER
No care due was identified.  [facility] Embedded Care Due Messages. Reference number: [id number].   4/23/2025 5:17:33 PM CDT

## 2025-04-25 ENCOUNTER — PATIENT MESSAGE (OUTPATIENT)
Dept: PRIMARY CARE CLINIC | Facility: CLINIC | Age: 65
End: 2025-04-25
Payer: COMMERCIAL

## 2025-04-25 RX ORDER — TIRZEPATIDE 5 MG/.5ML
5 INJECTION, SOLUTION SUBCUTANEOUS
Qty: 4 PEN | Refills: 3 | Status: SHIPPED | OUTPATIENT
Start: 2025-04-25

## 2025-04-28 ENCOUNTER — PATIENT MESSAGE (OUTPATIENT)
Dept: SPORTS MEDICINE | Facility: CLINIC | Age: 65
End: 2025-04-28
Payer: COMMERCIAL

## 2025-05-01 ENCOUNTER — PATIENT OUTREACH (OUTPATIENT)
Dept: ADMINISTRATIVE | Facility: HOSPITAL | Age: 65
End: 2025-05-01
Payer: COMMERCIAL

## 2025-05-05 ENCOUNTER — PATIENT MESSAGE (OUTPATIENT)
Dept: PRIMARY CARE CLINIC | Facility: CLINIC | Age: 65
End: 2025-05-05
Payer: COMMERCIAL

## 2025-05-05 DIAGNOSIS — E11.9 TYPE 2 DIABETES MELLITUS WITHOUT COMPLICATION, WITHOUT LONG-TERM CURRENT USE OF INSULIN: ICD-10-CM

## 2025-05-05 DIAGNOSIS — E78.5 HYPERLIPIDEMIA, UNSPECIFIED HYPERLIPIDEMIA TYPE: ICD-10-CM

## 2025-05-05 DIAGNOSIS — E66.811 CLASS 1 OBESITY WITH SERIOUS COMORBIDITY AND BODY MASS INDEX (BMI) OF 34.0 TO 34.9 IN ADULT, UNSPECIFIED OBESITY TYPE: ICD-10-CM

## 2025-05-05 NOTE — TELEPHONE ENCOUNTER
LOV with Tere Brown MD , 4/3/2025  Started on Mounjaro on 4/3/25. Dosage was increased on 4/25/25 to 5mg. Pt is c/o nausea and gastic pain. Asking for suggestions

## 2025-05-06 ENCOUNTER — PATIENT MESSAGE (OUTPATIENT)
Dept: SPORTS MEDICINE | Facility: CLINIC | Age: 65
End: 2025-05-06
Payer: COMMERCIAL

## 2025-05-06 RX ORDER — TIRZEPATIDE 2.5 MG/.5ML
2.5 INJECTION, SOLUTION SUBCUTANEOUS
Qty: 4 PEN | Refills: 3 | Status: SHIPPED | OUTPATIENT
Start: 2025-05-06

## 2025-05-06 NOTE — TELEPHONE ENCOUNTER
Please get more details in offer urgent visit if needed.    I recommend she decrease like down to the 2.5 mg dose, please also verify where she is getting her medication from as I do not see that we sent in a 5 mg dose

## 2025-05-06 NOTE — TELEPHONE ENCOUNTER
We can do another month on 2.5 mg weekly and then increase at that point if she is tolerating it well

## 2025-05-06 NOTE — TELEPHONE ENCOUNTER
The 5 mg mounjaro was sent by Dr. Reynolds on 04/25/25 by Dr. Reynolds. It is listed on her medication list at the top. Pt stated that the medication has lessened since her message sent yesterday. 2.5 mg Mounjaro pended below. Please advise.

## 2025-05-06 NOTE — TELEPHONE ENCOUNTER
No care due was identified.  [facility] Embedded Care Due Messages. Reference number: [id number].   5/06/2025 1:31:47 PM CDT

## 2025-05-13 ENCOUNTER — OFFICE VISIT (OUTPATIENT)
Dept: SPORTS MEDICINE | Facility: CLINIC | Age: 65
End: 2025-05-13
Payer: COMMERCIAL

## 2025-05-13 VITALS
HEART RATE: 80 BPM | SYSTOLIC BLOOD PRESSURE: 129 MMHG | DIASTOLIC BLOOD PRESSURE: 90 MMHG | WEIGHT: 204.06 LBS | BODY MASS INDEX: 34 KG/M2 | HEIGHT: 65 IN

## 2025-05-13 DIAGNOSIS — M17.11 PRIMARY OSTEOARTHRITIS OF RIGHT KNEE: Primary | ICD-10-CM

## 2025-05-13 PROCEDURE — 20610 DRAIN/INJ JOINT/BURSA W/O US: CPT | Mod: RT,S$GLB,, | Performed by: ORTHOPAEDIC SURGERY

## 2025-05-13 PROCEDURE — 99499 UNLISTED E&M SERVICE: CPT | Mod: S$GLB,,, | Performed by: ORTHOPAEDIC SURGERY

## 2025-05-13 PROCEDURE — 99999 PR PBB SHADOW E&M-EST. PATIENT-LVL II: CPT | Mod: PBBFAC,,, | Performed by: ORTHOPAEDIC SURGERY

## 2025-05-13 NOTE — PROGRESS NOTES
Patient is here for follow up of right knee arthritis. Pt is requesting Euflexxa injection #1.    Patient has painful DJD of right knee and has failed other conservative modalities including NSAIDS, activity modification, weight loss and rehabilitation exercises.      Patient has received good relief with these injections in the past.      The prior shots were tolerated well.  RADIOGRAPHIC IMAGING:   Kellgren-Beto Grade 2-3.   PHYSICAL EXAMINATION:      General: The patient is alert and oriented x 3. Mood is pleasant.   Observation of ears, eyes and nose reveals no gross abnormalities. No   labored breathing observed.      No signs of infection or adverse reaction to Right knee.      Euflexxa Injection Procedure #1     A time out was performed, including verification of patient ID, procedure, site and side, availability of information and equipment, review of safety issues, and agreement with consent, the procedure site was marked.     The patient was prepped aseptically with povidone-iodine swabsticks. A diagnostic and therapeutic injection of 2cc Euflexxa   was given under sterile technique using a 21.5g x 1.5 needle from the superolateral aspect of the right knee joint in the supine position.       she had no adverse reactions to the medication. Pain decreased. she   was instructed to apply ice to the joint for 20 minutes and avoid strenuous activities for 24-36 hours following the injection. she   was warned of possible blood pressure changes during that time. Following that time, she can resume activities as prior to the injection.     she was reminded to call the clinic immediately for any adverse side effects as explained in clinic today.     Lot: [id number]  Exp: 04/19/26

## 2025-05-13 NOTE — PROCEDURES
Large Joint Aspiration/Injection: R knee    Date/Time: 5/13/2025 10:30 AM    Performed by: Joel Glover MD  Authorized by: Joel Glover MD    Consent Done?:  Yes (Verbal)  Indications:  Pain  Site marked: the procedure site was marked    Timeout: prior to procedure the correct patient, procedure, and site was verified    Prep: patient was prepped and draped in usual sterile fashion      Details:  Needle Size:  22 G  Ultrasonic Guidance for needle placement?: No    Approach:  Superior  Location:  Knee  Site:  R knee  Medications:  10 mg sodium hyaluronate (EUFLEXXA) 10 mg/mL(mw 2.4 -3.6 million)  Patient tolerance:  Patient tolerated the procedure well with no immediate complications

## 2025-05-20 ENCOUNTER — OFFICE VISIT (OUTPATIENT)
Dept: SPORTS MEDICINE | Facility: CLINIC | Age: 65
End: 2025-05-20
Payer: COMMERCIAL

## 2025-05-20 VITALS
HEIGHT: 65 IN | BODY MASS INDEX: 33.62 KG/M2 | WEIGHT: 201.81 LBS | HEART RATE: 101 BPM | DIASTOLIC BLOOD PRESSURE: 83 MMHG | SYSTOLIC BLOOD PRESSURE: 125 MMHG

## 2025-05-20 DIAGNOSIS — M17.11 PRIMARY OSTEOARTHRITIS OF RIGHT KNEE: Primary | ICD-10-CM

## 2025-05-20 PROCEDURE — 99499 UNLISTED E&M SERVICE: CPT | Mod: S$GLB,,, | Performed by: ORTHOPAEDIC SURGERY

## 2025-05-20 PROCEDURE — 20610 DRAIN/INJ JOINT/BURSA W/O US: CPT | Mod: RT,S$GLB,, | Performed by: ORTHOPAEDIC SURGERY

## 2025-05-20 PROCEDURE — 99999 PR PBB SHADOW E&M-EST. PATIENT-LVL III: CPT | Mod: PBBFAC,,, | Performed by: ORTHOPAEDIC SURGERY

## 2025-05-20 NOTE — PROCEDURES
Large Joint Aspiration/Injection: R knee    Date/Time: 5/20/2025 10:30 AM    Performed by: Joel Glover MD  Authorized by: Joel Glover MD    Consent Done?:  Yes (Verbal)  Indications:  Pain  Site marked: the procedure site was marked    Timeout: prior to procedure the correct patient, procedure, and site was verified    Prep: patient was prepped and draped in usual sterile fashion      Details:  Needle Size:  22 G  Ultrasonic Guidance for needle placement?: No    Approach:  Superior  Location:  Knee  Site:  R knee  Medications:  10 mg sodium hyaluronate (EUFLEXXA) 10 mg/mL(mw 2.4 -3.6 million)  Patient tolerance:  Patient tolerated the procedure well with no immediate complications

## 2025-05-20 NOTE — PROGRESS NOTES
Patient is here for follow up of right knee arthritis. Pt is requesting Euflexxa injection #2.    Patient has painful DJD of right knee and has failed other conservative modalities including NSAIDS, activity modification, weight loss and rehabilitation exercises.      Patient has received good relief with these injections in the past.      The prior shots were tolerated well.  RADIOGRAPHIC IMAGING:   Kellgren-Beto Grade 2-3.   PHYSICAL EXAMINATION:      General: The patient is alert and oriented x 3. Mood is pleasant.   Observation of ears, eyes and nose reveals no gross abnormalities. No   labored breathing observed.      No signs of infection or adverse reaction to Right knee.      Euflexxa Injection Procedure #2     A time out was performed, including verification of patient ID, procedure, site and side, availability of information and equipment, review of safety issues, and agreement with consent, the procedure site was marked.     The patient was prepped aseptically with povidone-iodine swabsticks. A diagnostic and therapeutic injection of 2cc Euflexxa   was given under sterile technique using a 21.5g x 1.5 needle from the superolateral aspect of the right knee joint in the supine position.       she had no adverse reactions to the medication. Pain decreased. she   was instructed to apply ice to the joint for 20 minutes and avoid strenuous activities for 24-36 hours following the injection. she   was warned of possible blood pressure changes during that time. Following that time, she can resume activities as prior to the injection.     she was reminded to call the clinic immediately for any adverse side effects as explained in clinic today.     Lot: [id number]  Exp: 04/19/26

## 2025-05-27 ENCOUNTER — OFFICE VISIT (OUTPATIENT)
Dept: SPORTS MEDICINE | Facility: CLINIC | Age: 65
End: 2025-05-27
Payer: COMMERCIAL

## 2025-05-27 VITALS
BODY MASS INDEX: 33.43 KG/M2 | DIASTOLIC BLOOD PRESSURE: 85 MMHG | WEIGHT: 200.63 LBS | HEIGHT: 65 IN | SYSTOLIC BLOOD PRESSURE: 129 MMHG | HEART RATE: 91 BPM

## 2025-05-27 DIAGNOSIS — M17.11 PRIMARY OSTEOARTHRITIS OF RIGHT KNEE: ICD-10-CM

## 2025-05-27 DIAGNOSIS — Z02.6 ENCOUNTER RELATED TO WORKER'S COMPENSATION CLAIM: Primary | ICD-10-CM

## 2025-05-27 PROCEDURE — 20610 DRAIN/INJ JOINT/BURSA W/O US: CPT | Mod: RT,S$GLB,, | Performed by: ORTHOPAEDIC SURGERY

## 2025-05-27 PROCEDURE — 99999 PR PBB SHADOW E&M-EST. PATIENT-LVL III: CPT | Mod: PBBFAC,,, | Performed by: ORTHOPAEDIC SURGERY

## 2025-05-27 PROCEDURE — 99499 UNLISTED E&M SERVICE: CPT | Mod: S$GLB,,, | Performed by: ORTHOPAEDIC SURGERY

## 2025-05-27 NOTE — PROCEDURES
Large Joint Aspiration/Injection: R knee    Date/Time: 5/27/2025 11:15 AM    Performed by: Joel Glover MD  Authorized by: Joel Glover MD    Consent Done?:  Yes (Verbal)  Indications:  Pain  Site marked: the procedure site was marked    Timeout: prior to procedure the correct patient, procedure, and site was verified    Prep: patient was prepped and draped in usual sterile fashion      Details:  Needle Size:  22 G  Ultrasonic Guidance for needle placement?: No    Approach:  Superior  Location:  Knee  Site:  R knee  Medications:  10 mg sodium hyaluronate (EUFLEXXA) 10 mg/mL(mw 2.4 -3.6 million)  Patient tolerance:  Patient tolerated the procedure well with no immediate complications

## 2025-05-27 NOTE — PROGRESS NOTES
Patient is here for follow up of right knee arthritis. Pt is requesting Euflexxa injection #3.    Patient has painful DJD of right knee and has failed other conservative modalities including NSAIDS, activity modification, weight loss and rehabilitation exercises.      Patient has received good relief with these injections in the past.      The prior shots were tolerated well.  RADIOGRAPHIC IMAGING:   Kellgren-Beto Grade 2-3.   PHYSICAL EXAMINATION:      General: The patient is alert and oriented x 3. Mood is pleasant.   Observation of ears, eyes and nose reveals no gross abnormalities. No   labored breathing observed.      No signs of infection or adverse reaction to Right knee.      Euflexxa Injection Procedure #3     A time out was performed, including verification of patient ID, procedure, site and side, availability of information and equipment, review of safety issues, and agreement with consent, the procedure site was marked.     The patient was prepped aseptically with povidone-iodine swabsticks. A diagnostic and therapeutic injection of 2cc Euflexxa   was given under sterile technique using a 21.5g x 1.5 needle from the superolateral aspect of the right knee joint in the supine position.       she had no adverse reactions to the medication. Pain decreased. she   was instructed to apply ice to the joint for 20 minutes and avoid strenuous activities for 24-36 hours following the injection. she   was warned of possible blood pressure changes during that time. Following that time, she can resume activities as prior to the injection.     she was reminded to call the clinic immediately for any adverse side effects as explained in clinic today.     Lot: [id number]  Exp: 04/19/26

## 2025-05-28 DIAGNOSIS — Z72.820 POOR SLEEP: ICD-10-CM

## 2025-05-28 RX ORDER — HYDROXYZINE HYDROCHLORIDE 10 MG/1
10 TABLET, FILM COATED ORAL NIGHTLY PRN
Qty: 90 TABLET | Refills: 1 | Status: SHIPPED | OUTPATIENT
Start: 2025-05-28

## 2025-05-28 NOTE — TELEPHONE ENCOUNTER
Refill Routing Note   Medication(s) are not appropriate for processing by [facility] for the following reason(s):        Outside of protocol    [facility] action(s):  Route               Appointments  past 12m or future 3m with PCP    Date Provider   Last Visit   4/3/2025 Tere Brown MD   Next Visit   7/3/2025 Tere Brown MD   ED visits in past 90 days: 0        Note composed:12:11 PM 05/28/2025

## 2025-06-01 ENCOUNTER — PATIENT MESSAGE (OUTPATIENT)
Dept: PRIMARY CARE CLINIC | Facility: CLINIC | Age: 65
End: 2025-06-01
Payer: COMMERCIAL

## 2025-06-01 DIAGNOSIS — E66.811 CLASS 1 OBESITY WITH SERIOUS COMORBIDITY AND BODY MASS INDEX (BMI) OF 34.0 TO 34.9 IN ADULT, UNSPECIFIED OBESITY TYPE: ICD-10-CM

## 2025-06-01 DIAGNOSIS — E78.5 HYPERLIPIDEMIA, UNSPECIFIED HYPERLIPIDEMIA TYPE: ICD-10-CM

## 2025-06-01 DIAGNOSIS — E11.9 TYPE 2 DIABETES MELLITUS WITHOUT COMPLICATION, WITHOUT LONG-TERM CURRENT USE OF INSULIN: Primary | ICD-10-CM

## 2025-06-02 RX ORDER — TIRZEPATIDE 5 MG/.5ML
5 INJECTION, SOLUTION SUBCUTANEOUS
Qty: 2 ML | Refills: 0 | Status: CANCELLED | OUTPATIENT
Start: 2025-06-02

## 2025-06-02 NOTE — TELEPHONE ENCOUNTER
" LOV with Tere Brown MD , 4/3/2025  Nx appt 7/3/25     On 5/6/25 it is noted"We can do another month on 2.5 mg weekly and then increase at that point if she is tolerating it well " after pt was c/o nausea/gastric pain on the 5mg dosage of Mounjaro    Pt states that her symptoms have decreased. Asking if you recommend she try to increase her dosage or continue her current dosage. I pended the increase dosage if you would like to prescribe. Pt does have refills remaining on her current 2.5mg dose.  "

## 2025-06-09 RX ORDER — TIRZEPATIDE 5 MG/.5ML
5 INJECTION, SOLUTION SUBCUTANEOUS
Qty: 4 PEN | Refills: 3 | Status: SHIPPED | OUTPATIENT
Start: 2025-06-09

## 2025-06-10 ENCOUNTER — PATIENT OUTREACH (OUTPATIENT)
Dept: ADMINISTRATIVE | Facility: HOSPITAL | Age: 65
End: 2025-06-10
Payer: COMMERCIAL

## 2025-06-10 NOTE — LETTER
AUTHORIZATION FOR RELEASE OF   CONFIDENTIAL INFORMATION    Dear [facility],    We are seeing Teodora Yuen, date of birth 1960, in the clinic at [facility]. Tere Brown MD is the patient's PCP. Teodora Yuen has an outstanding lab/procedure at the time we reviewed her chart. In order to help keep her health information updated, she has authorized us to request the following medical record(s):        (  )  MAMMOGRAM                                      (  )  COLONOSCOPY      (  )  PAP SMEAR                                          (  )  OUTSIDE LAB RESULTS     (  )  DEXA SCAN                                          ( x )  EYE EXAM            (  )  FOOT EXAM                                          (  )  ENTIRE RECORD     (  )  OUTSIDE IMMUNIZATIONS                 (  )  _______________         Please fax records to Tere Brown MD,  at [phone] or email to [email or web address].      Patient Name: Teodora Yuen  : 1960  Patient Phone #: [phone]

## 2025-06-24 ENCOUNTER — PATIENT MESSAGE (OUTPATIENT)
Dept: PRIMARY CARE CLINIC | Facility: CLINIC | Age: 65
End: 2025-06-24
Payer: COMMERCIAL

## 2025-06-24 DIAGNOSIS — R11.0 NAUSEA: Primary | ICD-10-CM

## 2025-06-24 NOTE — TELEPHONE ENCOUNTER
Pt states she started the Mounjaro 5mg dose Saturday and has been experiencing nausea and vomiting x1 since. Pt is asking PCP to send in nausea med until the sx pass. Will pend zofran if appropriate.     LOV with Tere Brown MD , 4/3/2025

## 2025-06-24 NOTE — TELEPHONE ENCOUNTER
No care due was identified.  [facility] Embedded Care Due Messages. Reference number: [id number].   6/24/2025 1:23:38 PM CDT

## 2025-06-25 RX ORDER — ONDANSETRON 4 MG/1
4 TABLET, ORALLY DISINTEGRATING ORAL
Qty: 10 TABLET | Refills: 0 | Status: SHIPPED | OUTPATIENT
Start: 2025-06-25

## 2025-06-30 ENCOUNTER — LAB VISIT (OUTPATIENT)
Dept: LAB | Facility: HOSPITAL | Age: 65
End: 2025-06-30
Attending: FAMILY MEDICINE
Payer: COMMERCIAL

## 2025-06-30 DIAGNOSIS — E11.9 TYPE 2 DIABETES MELLITUS WITHOUT COMPLICATION, WITHOUT LONG-TERM CURRENT USE OF INSULIN: ICD-10-CM

## 2025-06-30 LAB
ALBUMIN/CREAT UR: ABNORMAL
CREAT UR-MCNC: >450 MG/DL (ref 15–325)
EAG (OHS): 126 MG/DL (ref 68–131)
HBA1C MFR BLD: 6 % (ref 4–5.6)
MICROALBUMIN UR-MCNC: 77 UG/ML (ref ?–5000)

## 2025-06-30 PROCEDURE — 36415 COLL VENOUS BLD VENIPUNCTURE: CPT

## 2025-06-30 PROCEDURE — 83036 HEMOGLOBIN GLYCOSYLATED A1C: CPT

## 2025-06-30 PROCEDURE — 82043 UR ALBUMIN QUANTITATIVE: CPT

## 2025-07-03 ENCOUNTER — PATIENT MESSAGE (OUTPATIENT)
Dept: PRIMARY CARE CLINIC | Facility: CLINIC | Age: 65
End: 2025-07-03

## 2025-07-03 ENCOUNTER — OFFICE VISIT (OUTPATIENT)
Dept: PRIMARY CARE CLINIC | Facility: CLINIC | Age: 65
End: 2025-07-03
Payer: COMMERCIAL

## 2025-07-03 VITALS
SYSTOLIC BLOOD PRESSURE: 130 MMHG | RESPIRATION RATE: 14 BRPM | HEIGHT: 65 IN | WEIGHT: 198 LBS | OXYGEN SATURATION: 97 % | BODY MASS INDEX: 32.99 KG/M2 | HEART RATE: 86 BPM | DIASTOLIC BLOOD PRESSURE: 82 MMHG

## 2025-07-03 DIAGNOSIS — E04.2 MULTINODULAR GOITER (NONTOXIC): ICD-10-CM

## 2025-07-03 DIAGNOSIS — D35.01 ADENOMA OF RIGHT ADRENAL GLAND: ICD-10-CM

## 2025-07-03 DIAGNOSIS — Z79.899 ENCOUNTER FOR MONITORING LONG-TERM PROTON PUMP INHIBITOR THERAPY: ICD-10-CM

## 2025-07-03 DIAGNOSIS — E66.811 CLASS 1 OBESITY DUE TO EXCESS CALORIES WITH SERIOUS COMORBIDITY AND BODY MASS INDEX (BMI) OF 32.0 TO 32.9 IN ADULT: ICD-10-CM

## 2025-07-03 DIAGNOSIS — E11.9 TYPE 2 DIABETES MELLITUS WITHOUT COMPLICATION, WITHOUT LONG-TERM CURRENT USE OF INSULIN: Primary | ICD-10-CM

## 2025-07-03 DIAGNOSIS — K21.9 GASTROESOPHAGEAL REFLUX DISEASE WITHOUT ESOPHAGITIS: ICD-10-CM

## 2025-07-03 DIAGNOSIS — E78.5 HYPERLIPIDEMIA, UNSPECIFIED HYPERLIPIDEMIA TYPE: ICD-10-CM

## 2025-07-03 DIAGNOSIS — E66.09 CLASS 1 OBESITY DUE TO EXCESS CALORIES WITH SERIOUS COMORBIDITY AND BODY MASS INDEX (BMI) OF 32.0 TO 32.9 IN ADULT: ICD-10-CM

## 2025-07-03 DIAGNOSIS — E55.9 VITAMIN D DEFICIENCY: ICD-10-CM

## 2025-07-03 DIAGNOSIS — D50.9 IRON DEFICIENCY ANEMIA, UNSPECIFIED IRON DEFICIENCY ANEMIA TYPE: ICD-10-CM

## 2025-07-03 DIAGNOSIS — N28.9 RENAL LESION: ICD-10-CM

## 2025-07-03 DIAGNOSIS — Z51.81 ENCOUNTER FOR MONITORING LONG-TERM PROTON PUMP INHIBITOR THERAPY: ICD-10-CM

## 2025-07-03 PROCEDURE — 99214 OFFICE O/P EST MOD 30 MIN: CPT | Mod: S$GLB,,, | Performed by: FAMILY MEDICINE

## 2025-07-03 PROCEDURE — 3008F BODY MASS INDEX DOCD: CPT | Mod: CPTII,S$GLB,, | Performed by: FAMILY MEDICINE

## 2025-07-03 PROCEDURE — 1159F MED LIST DOCD IN RCRD: CPT | Mod: CPTII,S$GLB,, | Performed by: FAMILY MEDICINE

## 2025-07-03 PROCEDURE — 3044F HG A1C LEVEL LT 7.0%: CPT | Mod: CPTII,S$GLB,, | Performed by: FAMILY MEDICINE

## 2025-07-03 PROCEDURE — 3075F SYST BP GE 130 - 139MM HG: CPT | Mod: CPTII,S$GLB,, | Performed by: FAMILY MEDICINE

## 2025-07-03 PROCEDURE — 1160F RVW MEDS BY RX/DR IN RCRD: CPT | Mod: CPTII,S$GLB,, | Performed by: FAMILY MEDICINE

## 2025-07-03 PROCEDURE — 3066F NEPHROPATHY DOC TX: CPT | Mod: CPTII,S$GLB,, | Performed by: FAMILY MEDICINE

## 2025-07-03 PROCEDURE — 3060F POS MICROALBUMINURIA REV: CPT | Mod: CPTII,S$GLB,, | Performed by: FAMILY MEDICINE

## 2025-07-03 PROCEDURE — 3079F DIAST BP 80-89 MM HG: CPT | Mod: CPTII,S$GLB,, | Performed by: FAMILY MEDICINE

## 2025-07-03 PROCEDURE — 99999 PR PBB SHADOW E&M-EST. PATIENT-LVL V: CPT | Mod: PBBFAC,,, | Performed by: FAMILY MEDICINE

## 2025-07-03 RX ORDER — TIRZEPATIDE 7.5 MG/.5ML
7.5 INJECTION, SOLUTION SUBCUTANEOUS
Qty: 4 PEN | Refills: 3 | Status: SHIPPED | OUTPATIENT
Start: 2025-07-03

## 2025-07-03 RX ORDER — ESOMEPRAZOLE MAGNESIUM 40 MG/1
40 CAPSULE, DELAYED RELEASE ORAL
Qty: 90 CAPSULE | Refills: 1 | Status: SHIPPED | OUTPATIENT
Start: 2025-07-03

## 2025-07-03 NOTE — PROGRESS NOTES
"Subjective:       Patient ID: Teodora Yuen is a 65 y.o. female.    Chief Complaint: Diabetes and Follow-up    HPI  66 y/o female with Vit D def, hx of Osteopenia, hlpd, r adrenal mass, multiple thyroid nodules s/p L thyroid lobectomy, hx of diverticulitis, hiatal hernia, low ferritin/iron, hx of gastric ulcer, gerd, family hx of ovarian ca in mother, Class 1 obesity BMI 32, DM2 is here for DM2 follow up.     She is feeling good overall, she has been taking Vitamin D and B12 and iron, she is trying to eat healthy, she has been more active in the yard, walking some. Her knee pain is better, taking Mobic one a week. She is doing well on Mounjaro 5 mg weekly x 4 weeks, weight is down about 9 pounds, she feels it causes some diarrhea and the iron causes some constipation and together its helpful, she would like to try higher dose, she is staying hydrated, limiting sweet tea. She denies f/n/v/heartburn/cp/sob/urinary sx. Sleeping fair, her mom needs help in the evenings.        R wrist fracture: following with ortho, completed OT  Gerd: Dr. Zelaya did EGD 2 years, she has a hiatal hernia, has gastric ulcer in the past,trial of nexium 20 mg was done and she developed breakthrough, nexium 40 mg daily  Vitamin D def: on supplement 5,000 IU daily  Low ferritin/iron: on supplement TID  Multiple thyroid nodules: s/p L sided lobectomy 11/2021, following with endo  DM2/Class 1 obesity BMI 32: off metformin as it was "not helpful", no personal or family hx of pancreatitis or medullary thyroid cancer, A1c 6.0 6/2025  HLPD: pravstatin 40 mg daily, will switch to crestor 10  GYN: no recent pelvic but stacy with bso in 1999 secondary to family hx of ovarian cancer and endometriosis, mmg overdue  Eye exam due at [facility]  Dental utd  R sided adrenal mass/renal lesion: was seen by Urology in 2019 felt all was stable, CT abd/pelvic 6/2022 size change, repeat due  Osteopenia: was on Actonel for 6 months in the past, repeat dexa " "due  Colonoscopy done in 2015 or 2016 Dr. Zelaya; hx of Diverticulitis, cologuard negative 1/2022     Review of Systems    Objective:      /82 (BP Location: Right arm, Patient Position: Sitting)   Pulse 86   Resp 14   Ht 5' 5" (1.651 m)   Wt 89.8 kg (197 lb 15.6 oz)   SpO2 97%   BMI 32.94 kg/m²   Physical Exam  Vitals and nursing note reviewed.   Constitutional:       Appearance: She is well-developed.   HENT:      Head: Normocephalic and atraumatic.      Mouth/Throat:      Pharynx: No oropharyngeal exudate or posterior oropharyngeal erythema.   Neck:      Thyroid: No thyromegaly.   Cardiovascular:      Rate and Rhythm: Normal rate and regular rhythm.      Heart sounds: Normal heart sounds.   Pulmonary:      Effort: Pulmonary effort is normal. No respiratory distress.      Breath sounds: Normal breath sounds.   Musculoskeletal:      Cervical back: Normal range of motion and neck supple.      Right lower leg: No edema.      Left lower leg: No edema.   Lymphadenopathy:      Cervical: No cervical adenopathy.   Skin:     General: Skin is warm and dry.   Neurological:      Mental Status: She is alert.         Assessment:       1. Type 2 diabetes mellitus without complication, without long-term current use of insulin    2. Class 1 obesity due to excess calories with serious comorbidity and body mass index (BMI) of 32.0 to 32.9 in adult    3. Hyperlipidemia, unspecified hyperlipidemia type    4. Gastroesophageal reflux disease without esophagitis    5. Iron deficiency anemia, unspecified iron deficiency anemia type    6. Encounter for monitoring long-term proton pump inhibitor therapy    7. Vitamin D deficiency    8. Multinodular goiter (nontoxic)    9. Adenoma of right adrenal gland    10. Renal lesion        Plan:   Teodora was seen today for diabetes and follow-up.    Diagnoses and all orders for this visit:    Type 2 diabetes mellitus without complication, without long-term current use of insulin  -     " Vitamin B12; Future  -     Hemoglobin A1C; Future  -     Comprehensive Metabolic Panel; Future  -     tirzepatide (MOUNJARO) 7.5 mg/0.5 mL PnIj; Inject 7.5 mg into the skin every 7 days.    Class 1 obesity due to excess calories with serious comorbidity and body mass index (BMI) of 32.0 to 32.9 in adult  -     tirzepatide (MOUNJARO) 7.5 mg/0.5 mL PnIj; Inject 7.5 mg into the skin every 7 days.    Hyperlipidemia, unspecified hyperlipidemia type  -     tirzepatide (MOUNJARO) 7.5 mg/0.5 mL PnIj; Inject 7.5 mg into the skin every 7 days.    Gastroesophageal reflux disease without esophagitis  -     esomeprazole (NEXIUM) 40 MG capsule; Take 1 capsule (40 mg total) by mouth daily before breakfast.  -     tirzepatide (MOUNJARO) 7.5 mg/0.5 mL PnIj; Inject 7.5 mg into the skin every 7 days.    Iron deficiency anemia, unspecified iron deficiency anemia type  -     Iron and TIBC; Future  -     Ferritin; Future    Encounter for monitoring long-term proton pump inhibitor therapy  -     esomeprazole (NEXIUM) 40 MG capsule; Take 1 capsule (40 mg total) by mouth daily before breakfast.    Vitamin D deficiency  -     Vitamin D; Future    Multinodular goiter (nontoxic)  -     US Soft Tissue Head Neck; Future    Adenoma of right adrenal gland    Renal lesion  -     US Retroperitoneal Complete; Future    We can go up to 10 mg weekly after 4 weeks of 7.5 mg weekly if she wants in between visits

## 2025-07-31 DIAGNOSIS — R11.0 NAUSEA: ICD-10-CM

## 2025-07-31 RX ORDER — ONDANSETRON 4 MG/1
4 TABLET, ORALLY DISINTEGRATING ORAL
Qty: 10 TABLET | Refills: 0 | Status: SHIPPED | OUTPATIENT
Start: 2025-07-31

## 2025-07-31 NOTE — TELEPHONE ENCOUNTER
No care due was identified.  [facility] Embedded Care Due Messages. Reference number: [id number].   7/31/2025 10:24:08 AM CDT

## 2025-07-31 NOTE — TELEPHONE ENCOUNTER
Refill Encounter    PCP Visits: Recent Visits  Date Type Provider Dept   07/03/25 Office Visit Tere Brown MD [facility]   04/03/25 Office Visit Tere Brown MD [facility]   Showing recent visits within past 360 days and meeting all other requirements  Future Appointments  Date Type Provider Dept   11/05/25 Appointment Tere Brown MD [facility]   Showing future appointments within next 720 days and meeting all other requirements      Last 3 Blood Pressure:   BP Readings from Last 3 Encounters:   07/03/25 130/82   05/27/25 129/85   05/20/25 125/83     Preferred Pharmacy:   [facility] [address] [address]  Phone: [phone] Fax: [phone]    [facility]  [address] [address] [address]  Phone: [phone] Fax: [phone]    [id number] [address] [place] [address] AT [facility]  [address] [place] [id number]  Phone: [phone] Fax: [phone]    Requested RX:  Requested Prescriptions     Pending Prescriptions Disp Refills    ondansetron (ZOFRAN-ODT) 4 MG TbDL 10 tablet 0     Sig: Dissolve 1 tablet (4 mg total) by mouth every 24 hours as needed.      RX Route: Normal

## 2025-08-21 ENCOUNTER — PATIENT MESSAGE (OUTPATIENT)
Dept: PRIMARY CARE CLINIC | Facility: CLINIC | Age: 65
End: 2025-08-21
Payer: COMMERCIAL

## 2025-08-21 DIAGNOSIS — E66.09 CLASS 1 OBESITY DUE TO EXCESS CALORIES WITH SERIOUS COMORBIDITY AND BODY MASS INDEX (BMI) OF 32.0 TO 32.9 IN ADULT: ICD-10-CM

## 2025-08-21 DIAGNOSIS — E78.5 HYPERLIPIDEMIA, UNSPECIFIED HYPERLIPIDEMIA TYPE: ICD-10-CM

## 2025-08-21 DIAGNOSIS — K21.9 GASTROESOPHAGEAL REFLUX DISEASE WITHOUT ESOPHAGITIS: ICD-10-CM

## 2025-08-21 DIAGNOSIS — E66.811 CLASS 1 OBESITY DUE TO EXCESS CALORIES WITH SERIOUS COMORBIDITY AND BODY MASS INDEX (BMI) OF 32.0 TO 32.9 IN ADULT: ICD-10-CM

## 2025-08-21 DIAGNOSIS — E11.9 TYPE 2 DIABETES MELLITUS WITHOUT COMPLICATION, WITHOUT LONG-TERM CURRENT USE OF INSULIN: ICD-10-CM

## 2025-08-22 DIAGNOSIS — R11.0 NAUSEA: ICD-10-CM

## 2025-08-22 RX ORDER — TIRZEPATIDE 7.5 MG/.5ML
7.5 INJECTION, SOLUTION SUBCUTANEOUS
Qty: 12 PEN | Refills: 0 | Status: SHIPPED | OUTPATIENT
Start: 2025-08-22

## 2025-08-25 RX ORDER — ONDANSETRON 4 MG/1
4 TABLET, ORALLY DISINTEGRATING ORAL
Qty: 10 TABLET | Refills: 0 | Status: SHIPPED | OUTPATIENT
Start: 2025-08-25

## 2025-08-31 ENCOUNTER — PATIENT MESSAGE (OUTPATIENT)
Dept: PRIMARY CARE CLINIC | Facility: CLINIC | Age: 65
End: 2025-08-31
Payer: COMMERCIAL

## (undated) DEVICE — CORD BIPOLAR 12 FOOT

## (undated) DEVICE — STAPLER SKIN PROXIMATE WIDE

## (undated) DEVICE — UNDERGLOVES BIOGEL PI SIZE 8

## (undated) DEVICE — BNDG COFLEX FOAM LF2 ST 6X5YD

## (undated) DEVICE — SKIN MARKERS DEROYAL

## (undated) DEVICE — SUT 2/0 30IN SILK BLK BRAI

## (undated) DEVICE — GOWN ECLIPSE REINF LV4 XLNG XL

## (undated) DEVICE — GLOVE SENSICARE PI GRN 7

## (undated) DEVICE — SUT MONOCRYL 4-0 PS-2

## (undated) DEVICE — SEE MEDLINE ITEM 154981

## (undated) DEVICE — NDL HYPO REG 25G X 1 1/2

## (undated) DEVICE — SUT MCRYL PLUS 4-0 PS2 27IN

## (undated) DEVICE — SEE MEDLINE ITEM 157194

## (undated) DEVICE — SUT VICRYL 3-0 27 SH

## (undated) DEVICE — SUT 3-0 12-18IN SILK

## (undated) DEVICE — ADHESIVE MASTISOL VIAL 48/BX

## (undated) DEVICE — DRESSING XEROFORM NONADH 1X8IN

## (undated) DEVICE — GOWN SURGICAL X-LARGE

## (undated) DEVICE — SHEET EENT SPLIT

## (undated) DEVICE — SUT LIGACLIP SMALL XTRA

## (undated) DEVICE — TOWEL OR DISP STRL BLUE 4/PK

## (undated) DEVICE — DRAIN CHANNEL ROUND 15FR

## (undated) DEVICE — TRAY MINOR GEN SURG

## (undated) DEVICE — BLADE SURG #15 CARBON STEEL

## (undated) DEVICE — DRAPE STERI U-SHAPED 47X51IN

## (undated) DEVICE — PAD ELECTRODE STER 1.5X3

## (undated) DEVICE — CLIP MED TICALL

## (undated) DEVICE — SOL NACL IRR 3000ML

## (undated) DEVICE — ELECTRODE BLADE INSULATED 1 IN

## (undated) DEVICE — WRAP KNEE ACCU THERM GEL PACK

## (undated) DEVICE — BLADE SHAVER LANZA 4.2X13CM

## (undated) DEVICE — GLOVE SENSICARE PI SURG 7

## (undated) DEVICE — CONTAINER SPECIMEN STRL 4OZ

## (undated) DEVICE — SUT 2-0 12-18IN SILK

## (undated) DEVICE — DRAPE ARTHSCP FLD CTRL POUCH

## (undated) DEVICE — GLOVE BIOGEL SKINSENSE PI 8.0

## (undated) DEVICE — GAUZE SPONGE PEANUT STRL

## (undated) DEVICE — EVACUATOR WOUND BULB 100CC

## (undated) DEVICE — Device

## (undated) DEVICE — FIBRILLAR ABS HEMOSTAT 4X4

## (undated) DEVICE — SUT COATED VICRYL 4/0 27IN

## (undated) DEVICE — TOURNIQUET SB QC DP 34X4IN

## (undated) DEVICE — ELECTRODE REM PLYHSV RETURN 9

## (undated) DEVICE — PAD ABDOMINAL STERILE 8X10IN

## (undated) DEVICE — DRAPE TOP 53X102IN

## (undated) DEVICE — SUT VICRYL PLUS 3-0 SH 18IN

## (undated) DEVICE — COVER CAMERA OPERATING ROOM

## (undated) DEVICE — SOL NACL IRR 1000ML BTL

## (undated) DEVICE — CLOSURE SKIN STERI STRIP 1/2X4

## (undated) DEVICE — ADHESIVE DERMABOND ADVANCED

## (undated) DEVICE — SPONGE COTTON TRAY 4X4IN

## (undated) DEVICE — SUT SILK 2-0 BLK BR PS-2 18